# Patient Record
Sex: FEMALE | Race: OTHER | Employment: FULL TIME | URBAN - METROPOLITAN AREA
[De-identification: names, ages, dates, MRNs, and addresses within clinical notes are randomized per-mention and may not be internally consistent; named-entity substitution may affect disease eponyms.]

---

## 2019-03-16 ENCOUNTER — HOSPITAL ENCOUNTER (EMERGENCY)
Facility: HOSPITAL | Age: 54
Discharge: HOME | End: 2019-03-16
Attending: EMERGENCY MEDICINE
Payer: MEDICAID

## 2019-03-16 ENCOUNTER — APPOINTMENT (OUTPATIENT)
Dept: GENERAL RADIOLOGY | Facility: HOSPITAL | Age: 54
End: 2019-03-16
Attending: EMERGENCY MEDICINE
Payer: MEDICAID

## 2019-03-16 VITALS
HEART RATE: 89 BPM | SYSTOLIC BLOOD PRESSURE: 120 MMHG | RESPIRATION RATE: 16 BRPM | OXYGEN SATURATION: 94 % | DIASTOLIC BLOOD PRESSURE: 63 MMHG | TEMPERATURE: 98 F

## 2019-03-16 DIAGNOSIS — J45.901 MODERATE ASTHMA WITH EXACERBATION, UNSPECIFIED WHETHER PERSISTENT: Primary | ICD-10-CM

## 2019-03-16 DIAGNOSIS — R73.9 HYPERGLYCEMIA: ICD-10-CM

## 2019-03-16 LAB
ANION GAP SERPL CALC-SCNC: 6 MMOL/L (ref 0–18)
BASOPHILS # BLD AUTO: 0.03 X10(3) UL (ref 0–0.2)
BASOPHILS NFR BLD AUTO: 0.4 %
BUN BLD-MCNC: 10 MG/DL (ref 7–18)
BUN/CREAT SERPL: 15.2 (ref 10–20)
CALCIUM BLD-MCNC: 9.3 MG/DL (ref 8.5–10.1)
CHLORIDE SERPL-SCNC: 109 MMOL/L (ref 98–107)
CO2 SERPL-SCNC: 26 MMOL/L (ref 21–32)
CREAT BLD-MCNC: 0.66 MG/DL (ref 0.55–1.02)
D DIMER PPP FEU-MCNC: 0.35 MCG/ML (ref ?–0.55)
DEPRECATED RDW RBC AUTO: 42.5 FL (ref 35.1–46.3)
EOSINOPHIL # BLD AUTO: 0.37 X10(3) UL (ref 0–0.7)
EOSINOPHIL NFR BLD AUTO: 5.4 %
ERYTHROCYTE [DISTWIDTH] IN BLOOD BY AUTOMATED COUNT: 12.7 % (ref 11–15)
GLUCOSE BLD-MCNC: 189 MG/DL (ref 70–99)
HCT VFR BLD AUTO: 42.8 % (ref 35–48)
HGB BLD-MCNC: 14.1 G/DL (ref 12–16)
IMM GRANULOCYTES # BLD AUTO: 0.02 X10(3) UL (ref 0–1)
IMM GRANULOCYTES NFR BLD: 0.3 %
LYMPHOCYTES # BLD AUTO: 1.62 X10(3) UL (ref 1–4)
LYMPHOCYTES NFR BLD AUTO: 23.7 %
MCH RBC QN AUTO: 30 PG (ref 26–34)
MCHC RBC AUTO-ENTMCNC: 32.9 G/DL (ref 31–37)
MCV RBC AUTO: 91.1 FL (ref 80–100)
MONOCYTES # BLD AUTO: 0.57 X10(3) UL (ref 0.1–1)
MONOCYTES NFR BLD AUTO: 8.3 %
NEUTROPHILS # BLD AUTO: 4.23 X10 (3) UL (ref 1.5–7.7)
NEUTROPHILS # BLD AUTO: 4.23 X10(3) UL (ref 1.5–7.7)
NEUTROPHILS NFR BLD AUTO: 61.9 %
OSMOLALITY SERPL CALC.SUM OF ELEC: 296 MOSM/KG (ref 275–295)
PLATELET # BLD AUTO: 260 10(3)UL (ref 150–450)
POTASSIUM SERPL-SCNC: 3.4 MMOL/L (ref 3.5–5.1)
RBC # BLD AUTO: 4.7 X10(6)UL (ref 3.8–5.3)
SODIUM SERPL-SCNC: 141 MMOL/L (ref 136–145)
TROPONIN I SERPL-MCNC: <0.045 NG/ML (ref ?–0.04)
WBC # BLD AUTO: 6.8 X10(3) UL (ref 4–11)

## 2019-03-16 PROCEDURE — 85025 COMPLETE CBC W/AUTO DIFF WBC: CPT | Performed by: EMERGENCY MEDICINE

## 2019-03-16 PROCEDURE — 94640 AIRWAY INHALATION TREATMENT: CPT

## 2019-03-16 PROCEDURE — 99285 EMERGENCY DEPT VISIT HI MDM: CPT

## 2019-03-16 PROCEDURE — 85379 FIBRIN DEGRADATION QUANT: CPT | Performed by: EMERGENCY MEDICINE

## 2019-03-16 PROCEDURE — 84484 ASSAY OF TROPONIN QUANT: CPT | Performed by: EMERGENCY MEDICINE

## 2019-03-16 PROCEDURE — 93010 ELECTROCARDIOGRAM REPORT: CPT | Performed by: EMERGENCY MEDICINE

## 2019-03-16 PROCEDURE — 93005 ELECTROCARDIOGRAM TRACING: CPT

## 2019-03-16 PROCEDURE — 80048 BASIC METABOLIC PNL TOTAL CA: CPT | Performed by: EMERGENCY MEDICINE

## 2019-03-16 PROCEDURE — 71045 X-RAY EXAM CHEST 1 VIEW: CPT | Performed by: EMERGENCY MEDICINE

## 2019-03-16 PROCEDURE — 96374 THER/PROPH/DIAG INJ IV PUSH: CPT

## 2019-03-16 RX ORDER — ALBUTEROL SULFATE 90 UG/1
2 AEROSOL, METERED RESPIRATORY (INHALATION) EVERY 4 HOURS PRN
Qty: 1 INHALER | Refills: 0 | Status: SHIPPED | OUTPATIENT
Start: 2019-03-16 | End: 2019-04-15

## 2019-03-16 RX ORDER — BLOOD-GLUCOSE METER
1 KIT MISCELLANEOUS 3 TIMES DAILY
Qty: 1 EACH | Refills: 0 | Status: SHIPPED | OUTPATIENT
Start: 2019-03-16 | End: 2020-09-09

## 2019-03-16 RX ORDER — METHYLPREDNISOLONE SODIUM SUCCINATE 125 MG/2ML
60 INJECTION, POWDER, LYOPHILIZED, FOR SOLUTION INTRAMUSCULAR; INTRAVENOUS ONCE
Status: COMPLETED | OUTPATIENT
Start: 2019-03-16 | End: 2019-03-16

## 2019-03-16 RX ORDER — PREDNISONE 20 MG/1
20 TABLET ORAL DAILY
Qty: 3 TABLET | Refills: 0 | Status: SHIPPED | OUTPATIENT
Start: 2019-03-16 | End: 2019-03-19

## 2019-03-16 RX ORDER — ALBUTEROL SULFATE 2.5 MG/3ML
2.5 SOLUTION RESPIRATORY (INHALATION) EVERY 6 HOURS PRN
Qty: 20 VIAL | Refills: 0 | Status: SHIPPED | OUTPATIENT
Start: 2019-03-16 | End: 2020-01-13

## 2019-03-17 NOTE — ED PROVIDER NOTES
Patient Seen in: [facility] AND [facility] Emergency Department    History   Patient presents with:  Dyspnea CARO SOB (respiratory)    Stated Complaint: SOB/cough     HPI    HPI: Korina Orourke is a 53year old female with a history of asthma who presents with whe Current:/63   Pulse 89   Temp 97.6 °F (36.4 °C)   Resp 16   SpO2 94%   PULSE OX Nl on room air          Physical Exam  Constitutional:  Mild resp distress   Eyes: EOMI, PERRL, conjunctiva normal.  HENT: Atraumatic, oropharynx moist. Neck supple wheezing patient feels well enough to go home.             Disposition and Plan     Clinical Impression:  Moderate asthma with exacerbation, unspecified whether persistent  (primary encounter diagnosis)  Hyperglycemia    Disposition:  Discharge  3/16/2019 total) by mouth daily for 3 days. , Print Script, Disp-3 tablet, R-0

## 2019-04-24 ENCOUNTER — HOSPITAL ENCOUNTER (EMERGENCY)
Facility: HOSPITAL | Age: 54
Discharge: HOME | End: 2019-04-24
Attending: PHYSICIAN ASSISTANT
Payer: OTHER MISCELLANEOUS

## 2019-04-24 ENCOUNTER — APPOINTMENT (OUTPATIENT)
Dept: GENERAL RADIOLOGY | Facility: HOSPITAL | Age: 54
End: 2019-04-24
Attending: PHYSICIAN ASSISTANT
Payer: OTHER MISCELLANEOUS

## 2019-04-24 VITALS
TEMPERATURE: 99 F | OXYGEN SATURATION: 97 % | HEART RATE: 76 BPM | RESPIRATION RATE: 18 BRPM | DIASTOLIC BLOOD PRESSURE: 78 MMHG | SYSTOLIC BLOOD PRESSURE: 124 MMHG

## 2019-04-24 DIAGNOSIS — S86.911A MUSCLE STRAIN OF RIGHT LOWER LEG, INITIAL ENCOUNTER: ICD-10-CM

## 2019-04-24 DIAGNOSIS — S86.911A KNEE STRAIN, RIGHT, INITIAL ENCOUNTER: ICD-10-CM

## 2019-04-24 DIAGNOSIS — S81.801A OPEN WOUND OF RIGHT LOWER LEG, INITIAL ENCOUNTER: Primary | ICD-10-CM

## 2019-04-24 PROCEDURE — 99284 EMERGENCY DEPT VISIT MOD MDM: CPT

## 2019-04-24 PROCEDURE — 73590 X-RAY EXAM OF LOWER LEG: CPT | Performed by: PHYSICIAN ASSISTANT

## 2019-04-24 PROCEDURE — 73630 X-RAY EXAM OF FOOT: CPT | Performed by: PHYSICIAN ASSISTANT

## 2019-04-24 PROCEDURE — 73560 X-RAY EXAM OF KNEE 1 OR 2: CPT | Performed by: PHYSICIAN ASSISTANT

## 2019-04-24 RX ORDER — HYDROCODONE BITARTRATE AND ACETAMINOPHEN 5; 325 MG/1; MG/1
1 TABLET ORAL ONCE
Status: COMPLETED | OUTPATIENT
Start: 2019-04-24 | End: 2019-04-24

## 2019-04-24 RX ORDER — CEPHALEXIN 500 MG/1
500 CAPSULE ORAL 3 TIMES DAILY
Qty: 21 CAPSULE | Refills: 0 | Status: SHIPPED | OUTPATIENT
Start: 2019-04-24 | End: 2019-05-01

## 2019-04-24 RX ORDER — HYDROCODONE BITARTRATE AND ACETAMINOPHEN 5; 325 MG/1; MG/1
1 TABLET ORAL EVERY 6 HOURS PRN
Qty: 12 TABLET | Refills: 0 | Status: SHIPPED | OUTPATIENT
Start: 2019-04-24 | End: 2019-05-01

## 2019-04-24 RX ORDER — NAPROXEN 500 MG/1
500 TABLET ORAL 2 TIMES DAILY PRN
Qty: 20 TABLET | Refills: 0 | Status: SHIPPED | OUTPATIENT
Start: 2019-04-24 | End: 2019-05-01

## 2019-04-24 NOTE — ED INITIAL ASSESSMENT (HPI)
Pt reports a metal object falling on her right lower leg while at work this morning causing a cut. Pt was seen at [facility] and had xray and dressing applied. Pt arrives to [facility] c/o continued bleeding and pain.

## 2019-04-24 NOTE — ED PROVIDER NOTES
Patient Seen in: [facility] AND [facility] Emergency Department    History   Patient presents with:  Laceration Abrasion (integumentary)    Stated Complaint: cut on leg    HPI      HPI:     53year old female who presents with chief complaint of right leg injury signs reviewed. All other systems reviewed and negative except as noted above. PSFH elements reviewed from today and agreed except as otherwise stated in HPI.     Physical Exam     ED Triage Vitals   BP 04/24/19 1503 119/65   Pulse 04/24/19 1501 79 Positive ecchymosis and swelling present at right lateral foot. Generalized tenderness to palpation along right lower leg and right foot. Full range of motion of right lower leg with reported pain at right knee and right foot. Distal pulses intact.   Cap documented. Results reviewed and need for follow-up discussed with patient. Patient case discussed with Dr. Vincent Kruger.     Disposition and Plan     Clinical Impression:  Open wound of right lower leg, initial encounter  (primary encounter diagnosis)  Knee str

## 2019-05-15 ENCOUNTER — OFFICE VISIT (OUTPATIENT)
Dept: FAMILY MEDICINE CLINIC | Facility: CLINIC | Age: 54
End: 2019-05-15
Payer: OTHER MISCELLANEOUS

## 2019-05-15 VITALS
DIASTOLIC BLOOD PRESSURE: 80 MMHG | BODY MASS INDEX: 31.32 KG/M2 | SYSTOLIC BLOOD PRESSURE: 123 MMHG | HEIGHT: 65 IN | RESPIRATION RATE: 20 BRPM | WEIGHT: 188 LBS | HEART RATE: 78 BPM

## 2019-05-15 DIAGNOSIS — M79.604 RIGHT LEG PAIN: ICD-10-CM

## 2019-05-15 DIAGNOSIS — M25.571 ACUTE RIGHT ANKLE PAIN: ICD-10-CM

## 2019-05-15 DIAGNOSIS — S81.801A OPEN WOUND OF RIGHT LOWER LEG, INITIAL ENCOUNTER: Primary | ICD-10-CM

## 2019-05-15 PROCEDURE — 99212 OFFICE O/P EST SF 10 MIN: CPT | Performed by: FAMILY MEDICINE

## 2019-05-15 PROCEDURE — 99214 OFFICE O/P EST MOD 30 MIN: CPT | Performed by: FAMILY MEDICINE

## 2019-05-15 RX ORDER — HYDROCODONE BITARTRATE AND ACETAMINOPHEN 5; 325 MG/1; MG/1
1 TABLET ORAL EVERY 6 HOURS PRN
Qty: 20 TABLET | Refills: 0 | Status: SHIPPED | OUTPATIENT
Start: 2019-05-15 | End: 2019-06-29

## 2019-05-15 RX ORDER — IBUPROFEN 800 MG/1
800 TABLET ORAL 3 TIMES DAILY
Qty: 90 TABLET | Refills: 1 | Status: SHIPPED | OUTPATIENT
Start: 2019-05-15 | End: 2019-07-11 | Stop reason: ALTCHOICE

## 2019-05-15 NOTE — PROGRESS NOTES
Blood pressure 123/80, pulse 78, resp. rate 20, height 5' 5\" (1.651 m), weight 188 lb (85.3 kg).     Patient presents today following up for injury that occurred at work on April 24, 2019 a piece of iron that weighed more than 100 pounds fell on her right

## 2019-05-20 ENCOUNTER — HOSPITAL ENCOUNTER (EMERGENCY)
Facility: HOSPITAL | Age: 54
Discharge: HOME | End: 2019-05-20
Attending: EMERGENCY MEDICINE
Payer: OTHER MISCELLANEOUS

## 2019-05-20 VITALS
BODY MASS INDEX: 31.65 KG/M2 | HEART RATE: 73 BPM | HEIGHT: 65 IN | WEIGHT: 190 LBS | DIASTOLIC BLOOD PRESSURE: 75 MMHG | TEMPERATURE: 99 F | RESPIRATION RATE: 18 BRPM | OXYGEN SATURATION: 99 % | SYSTOLIC BLOOD PRESSURE: 129 MMHG

## 2019-05-20 DIAGNOSIS — L08.9 WOUND INFECTION: Primary | ICD-10-CM

## 2019-05-20 DIAGNOSIS — T14.8XXA WOUND INFECTION: Primary | ICD-10-CM

## 2019-05-20 PROCEDURE — 99283 EMERGENCY DEPT VISIT LOW MDM: CPT

## 2019-05-20 RX ORDER — SULFAMETHOXAZOLE AND TRIMETHOPRIM 800; 160 MG/1; MG/1
1 TABLET ORAL 2 TIMES DAILY
Qty: 20 TABLET | Refills: 0 | Status: SHIPPED | OUTPATIENT
Start: 2019-05-20 | End: 2019-05-30

## 2019-05-20 NOTE — ED NOTES
Wound border is well approximated, dark colored scab in tact, area is warm and non-tender. Wound has scant sanguinous/pus drainage on gauze.

## 2019-05-20 NOTE — ED PROVIDER NOTES
Patient Seen in: [facility] AND [facility] Emergency Department    History   Patient presents with:  Cellulitis (integumentary, infectious)    Stated Complaint: right ankle/foot injury    HPI    26-year-old female with past medical history significant for asthma No tracheal deviation present. CV: s1s2+, RRR, no m/r/g, normal distal pulses  Pulmonary/Chest: CTA b/l with no rales, wheezes. No chest wall tenderness  Abdominal: Nontender. Nondistended. Soft. Bowel sounds are normal.   Back:   :    Musculoskeletal

## 2019-05-20 NOTE — ED INITIAL ASSESSMENT (HPI)
Right lower leg ulcer--patient hurt it on a metal piece 4/24 and wound is looking worse.  Hx of diabetes    Started PT today and was told to come to ED for eval    Finished antibiotics

## 2019-05-21 ENCOUNTER — OFFICE VISIT (OUTPATIENT)
Dept: FAMILY MEDICINE CLINIC | Facility: CLINIC | Age: 54
End: 2019-05-21
Payer: OTHER MISCELLANEOUS

## 2019-05-21 VITALS
BODY MASS INDEX: 32.4 KG/M2 | WEIGHT: 194.5 LBS | SYSTOLIC BLOOD PRESSURE: 126 MMHG | HEIGHT: 65 IN | DIASTOLIC BLOOD PRESSURE: 76 MMHG | HEART RATE: 76 BPM

## 2019-05-21 DIAGNOSIS — Z12.31 SCREENING MAMMOGRAM, ENCOUNTER FOR: Primary | ICD-10-CM

## 2019-05-21 PROCEDURE — 99213 OFFICE O/P EST LOW 20 MIN: CPT | Performed by: FAMILY MEDICINE

## 2019-05-21 PROCEDURE — 99212 OFFICE O/P EST SF 10 MIN: CPT | Performed by: FAMILY MEDICINE

## 2019-05-21 RX ORDER — NAPROXEN 500 MG/1
TABLET ORAL
Refills: 0 | COMMUNITY
Start: 2019-05-07 | End: 2019-07-11 | Stop reason: ALTCHOICE

## 2019-05-21 RX ORDER — ATORVASTATIN CALCIUM 40 MG/1
TABLET, FILM COATED ORAL
Refills: 3 | COMMUNITY
Start: 2019-02-13 | End: 2019-07-25

## 2019-05-21 NOTE — PROGRESS NOTES
Blood pressure 126/76, pulse 76, height 5' 5\" (1.651 m), weight 194 lb 8 oz (88.2 kg). Patient presents today following up for right leg wound has been placing Neosporin on it she no longer has pain she sleeps well at night.     Objective patient is com

## 2019-06-13 ENCOUNTER — HOSPITAL ENCOUNTER (OUTPATIENT)
Dept: MAMMOGRAPHY | Facility: HOSPITAL | Age: 54
Discharge: HOME | End: 2019-06-13
Attending: FAMILY MEDICINE
Payer: COMMERCIAL

## 2019-06-13 DIAGNOSIS — Z12.31 SCREENING MAMMOGRAM, ENCOUNTER FOR: ICD-10-CM

## 2019-06-13 PROCEDURE — 77063 BREAST TOMOSYNTHESIS BI: CPT | Performed by: FAMILY MEDICINE

## 2019-06-13 PROCEDURE — 77067 SCR MAMMO BI INCL CAD: CPT | Performed by: FAMILY MEDICINE

## 2019-06-21 ENCOUNTER — TELEPHONE (OUTPATIENT)
Dept: OTHER | Age: 54
End: 2019-06-21

## 2019-06-21 NOTE — TELEPHONE ENCOUNTER
Patient seen in the office on 5/15/19 for right leg wound. Per her daughter, she did not bump it and patient stated it just opened up on it's own however, it did have a scab and was healing. Patient sent her daughter a picture and asked her to call in.  It

## 2019-06-23 ENCOUNTER — HOSPITAL ENCOUNTER (EMERGENCY)
Facility: HOSPITAL | Age: 54
Discharge: HOME | End: 2019-06-23
Attending: EMERGENCY MEDICINE
Payer: OTHER MISCELLANEOUS

## 2019-06-23 VITALS
TEMPERATURE: 99 F | DIASTOLIC BLOOD PRESSURE: 77 MMHG | HEIGHT: 65 IN | HEART RATE: 82 BPM | RESPIRATION RATE: 17 BRPM | WEIGHT: 190 LBS | BODY MASS INDEX: 31.65 KG/M2 | SYSTOLIC BLOOD PRESSURE: 134 MMHG | OXYGEN SATURATION: 96 %

## 2019-06-23 DIAGNOSIS — L03.90 CELLULITIS, UNSPECIFIED CELLULITIS SITE: ICD-10-CM

## 2019-06-23 DIAGNOSIS — L97.912 LEG ULCER, RIGHT, WITH FAT LAYER EXPOSED (HCC): Primary | ICD-10-CM

## 2019-06-23 PROCEDURE — 99282 EMERGENCY DEPT VISIT SF MDM: CPT

## 2019-06-23 NOTE — ED NOTES
Received pt from triage. Pt here with open wound to RLE that she has had for the past 2 months. Pt states she has seen her PMd and is currently on antibiotics but not getting any better. Wound is open with necrosis and pus. Pt denies pain.  Dr. Emilee Mercado at [facility] f

## 2019-06-23 NOTE — ED PROVIDER NOTES
Patient Seen in: [facility] AND [facility] Emergency Department    History   Patient presents with:  Rash Skin Problem (integumentary)    Stated Complaint: RIGHT FOOT WOUND X 10 DAYS    HPI    Patient is here with complaint of wound to the right lower extremity. WOUND X 10 DAYS  Other systems are as noted in HPI. Constitutional and vital signs reviewed. All other systems reviewed and negative except as noted above.       Physical Exam     ED Triage Vitals [06/23/19 1828]   /77   Pulse 82   Resp 17   Tem are going to call tomorrow they are going to get her in for follow-up. We did redress the wound tonight by doing bacitracin and a fresh bandage.     ED Course   Labs Reviewed - No data to display     MDM     96% Normal  Pulse oximetry         Disposition a

## 2019-06-24 NOTE — CM/SW NOTE
Spoke with Lacie Scheuermann [id number], outpt wound care, regarding scheduling an ER f/u appointment with patient. EDCM does not see any order for wound care. Lacie Scheuermann stated that she will follow up with patient.   However, Lacie Scheuermann also stated that since it is a work

## 2019-06-24 NOTE — CM/SW NOTE
Met with patient and this writer spoke in [address] as pt does not speak English and informed of instruction to follow up with our outpatient wound clinic. Patient and family at bedside v/u, provided with name and contact information of wound clinic.   Dr. Alex Joiner

## 2019-06-25 ENCOUNTER — TELEPHONE (OUTPATIENT)
Dept: FAMILY MEDICINE CLINIC | Facility: CLINIC | Age: 54
End: 2019-06-25

## 2019-06-25 DIAGNOSIS — L97.919 ULCER OF RIGHT LOWER EXTREMITY, UNSPECIFIED ULCER STAGE (HCC): Primary | ICD-10-CM

## 2019-06-25 NOTE — TELEPHONE ENCOUNTER
----- Message from Adrian Dixon DO sent at 6/24/2019  7:48 PM CDT -----  Regarding: FW: Order Needed      ----- Message -----  From: Lauren Joseph  Sent: 6/24/2019   9:34 AM  To: Adrian Dixon DO  Subject: Order Needed

## 2019-06-29 ENCOUNTER — OFFICE VISIT (OUTPATIENT)
Dept: FAMILY MEDICINE CLINIC | Facility: CLINIC | Age: 54
End: 2019-06-29
Payer: COMMERCIAL

## 2019-06-29 VITALS
BODY MASS INDEX: 32.82 KG/M2 | HEIGHT: 65 IN | DIASTOLIC BLOOD PRESSURE: 74 MMHG | WEIGHT: 197 LBS | SYSTOLIC BLOOD PRESSURE: 121 MMHG | TEMPERATURE: 99 F | HEART RATE: 77 BPM

## 2019-06-29 DIAGNOSIS — S81.801D LEG WOUND, RIGHT, SUBSEQUENT ENCOUNTER: Primary | ICD-10-CM

## 2019-06-29 PROCEDURE — 99213 OFFICE O/P EST LOW 20 MIN: CPT | Performed by: FAMILY MEDICINE

## 2019-06-29 PROCEDURE — 99212 OFFICE O/P EST SF 10 MIN: CPT | Performed by: FAMILY MEDICINE

## 2019-06-29 RX ORDER — SULFAMETHOXAZOLE AND TRIMETHOPRIM 800; 160 MG/1; MG/1
1 TABLET ORAL 2 TIMES DAILY
COMMUNITY
End: 2019-07-11 | Stop reason: ALTCHOICE

## 2019-06-29 RX ORDER — CIPROFLOXACIN 500 MG/1
TABLET, FILM COATED ORAL 2 TIMES DAILY
COMMUNITY
End: 2019-07-11 | Stop reason: ALTCHOICE

## 2019-06-29 RX ORDER — CALCIUM CITRATE/VITAMIN D3 200MG-6.25
TABLET ORAL
Refills: 1 | COMMUNITY
Start: 2019-06-08 | End: 2020-07-30

## 2019-06-29 RX ORDER — GLUCOSAM/CHON-MSM1/C/MANG/BOSW 500-416.6
TABLET ORAL
Refills: 1 | COMMUNITY
Start: 2019-06-08 | End: 2020-07-30

## 2019-06-29 NOTE — PROGRESS NOTES
HPI:    Patient ID: Silas Dawson is a 53year old female. Pt presents for follow up from the ER for persistent non-healing wound of right leg. Pt states this occurred after an injury at work.  Has been going to work clinic and is on abx but wound not he Oral Tab,  TK 1 T PO BID WC   naproxen 500 MG Oral Tab,  TK 1 T PO  BID PRN   Acetaminophen (TYLENOL OR),  Take by mouth. HYDROcodone-acetaminophen (NORCO) 5-325 MG Oral Tab,  Take 1 tablet by mouth every 6 (six) hours as needed for Pain.    ibuprofen 800 noted.  Neurology: [place] all extremities equally with good coordination.  No cranial nerve asymmetry noted.   Psychiatric:  Normal affect.  Oriented.  No unusual behavior.  Interacting well.     Patient is here with nonhealing wound with history of diabet Sulfamethoxazole-TMP -160 MG Oral Tab per tablet Take 1 tablet by mouth 2 (two) times daily.  Disp:  Rfl:    atorvastatin 40 MG Oral Tab TK 1 T PO D Disp:  Rfl: 3   metFORMIN HCl 1000 MG Oral Tab TK 1 T PO BID WC Disp:  Rfl: 3   ibuprofen 800 MG Ora

## 2019-07-09 ENCOUNTER — HOSPITAL ENCOUNTER (OUTPATIENT)
Dept: ULTRASOUND IMAGING | Facility: HOSPITAL | Age: 54
Discharge: HOME | End: 2019-07-09
Attending: FAMILY MEDICINE
Payer: COMMERCIAL

## 2019-07-09 ENCOUNTER — HOSPITAL ENCOUNTER (OUTPATIENT)
Dept: MAMMOGRAPHY | Facility: HOSPITAL | Age: 54
Discharge: HOME | End: 2019-07-09
Attending: FAMILY MEDICINE
Payer: COMMERCIAL

## 2019-07-09 DIAGNOSIS — R92.8 ABNORMAL MAMMOGRAM: ICD-10-CM

## 2019-07-09 PROCEDURE — 77062 BREAST TOMOSYNTHESIS BI: CPT | Performed by: FAMILY MEDICINE

## 2019-07-09 PROCEDURE — 77066 DX MAMMO INCL CAD BI: CPT | Performed by: FAMILY MEDICINE

## 2019-07-09 PROCEDURE — 76642 ULTRASOUND BREAST LIMITED: CPT | Performed by: FAMILY MEDICINE

## 2019-07-10 ENCOUNTER — TELEPHONE (OUTPATIENT)
Dept: FAMILY MEDICINE CLINIC | Facility: CLINIC | Age: 54
End: 2019-07-10

## 2019-07-10 NOTE — TELEPHONE ENCOUNTER
Emmanuelle Boswell called in from [employer] in regards to pt's worker's comp case. Emmanuelle Boswell is requesting an update in regards to pt's return to work status. Emmanuelle Boswell is requesting to have an update faxed to fax: [phone] Attn: Emmanuelle Boswell.    Please advise

## 2019-07-11 ENCOUNTER — OFFICE VISIT (OUTPATIENT)
Dept: FAMILY MEDICINE CLINIC | Facility: CLINIC | Age: 54
End: 2019-07-11
Payer: COMMERCIAL

## 2019-07-11 VITALS
TEMPERATURE: 98 F | SYSTOLIC BLOOD PRESSURE: 130 MMHG | HEART RATE: 75 BPM | DIASTOLIC BLOOD PRESSURE: 74 MMHG | BODY MASS INDEX: 33 KG/M2 | WEIGHT: 198 LBS

## 2019-07-11 DIAGNOSIS — S81.801D OPEN WOUND OF RIGHT LOWER EXTREMITY, SUBSEQUENT ENCOUNTER: ICD-10-CM

## 2019-07-11 DIAGNOSIS — E11.9 TYPE 2 DIABETES MELLITUS WITHOUT COMPLICATION, WITHOUT LONG-TERM CURRENT USE OF INSULIN (HCC): Primary | ICD-10-CM

## 2019-07-11 PROCEDURE — 99214 OFFICE O/P EST MOD 30 MIN: CPT | Performed by: FAMILY MEDICINE

## 2019-07-11 RX ORDER — LISINOPRIL 5 MG/1
5 TABLET ORAL DAILY
Qty: 90 TABLET | Refills: 3 | Status: SHIPPED | OUTPATIENT
Start: 2019-07-11 | End: 2020-07-30

## 2019-07-11 NOTE — TELEPHONE ENCOUNTER
Talked to Griselda Nailer daughter of pt explained to her that pt appt today with Dr Aruna Danielson is different with Dr Randolph for tomorrow. And she understood.

## 2019-07-11 NOTE — PROGRESS NOTES
Needs new diabetic doctor. No sob   No foot pain  No problems with sugars. Has wound from 3 months. Still hard and open and from a piece of metal  Had appts cancelled from them  Now has appt with plastics tomorrow.         Patient's past medical surgic DIABETIC TEST STRIPS AND SUPPLIES    2. Open wound of right lower extremity, subsequent encounter  Agree with plastics +- wound center  Call if still no advancement of cure in a short period of time.

## 2019-07-12 ENCOUNTER — APPOINTMENT (OUTPATIENT)
Dept: WOUND CARE | Facility: HOSPITAL | Age: 54
End: 2019-07-12
Attending: NURSE PRACTITIONER
Payer: COMMERCIAL

## 2019-07-12 ENCOUNTER — OFFICE VISIT (OUTPATIENT)
Dept: WOUND CARE | Facility: HOSPITAL | Age: 54
End: 2019-07-12
Attending: NURSE PRACTITIONER
Payer: OTHER MISCELLANEOUS

## 2019-07-12 ENCOUNTER — OFFICE VISIT (OUTPATIENT)
Dept: FAMILY MEDICINE CLINIC | Facility: CLINIC | Age: 54
End: 2019-07-12
Payer: OTHER MISCELLANEOUS

## 2019-07-12 VITALS
DIASTOLIC BLOOD PRESSURE: 70 MMHG | HEIGHT: 65 IN | BODY MASS INDEX: 32.82 KG/M2 | RESPIRATION RATE: 20 BRPM | WEIGHT: 197 LBS | SYSTOLIC BLOOD PRESSURE: 106 MMHG | HEART RATE: 78 BPM

## 2019-07-12 DIAGNOSIS — L97.919 DIABETIC ULCER OF RIGHT LOWER LEG (HCC): Primary | ICD-10-CM

## 2019-07-12 DIAGNOSIS — S81.801D OPEN WOUND OF RIGHT LOWER LEG, SUBSEQUENT ENCOUNTER: Primary | ICD-10-CM

## 2019-07-12 DIAGNOSIS — E11.622 DIABETIC ULCER OF RIGHT LOWER LEG (HCC): Primary | ICD-10-CM

## 2019-07-12 DIAGNOSIS — S91.001A UNSPECIFIED OPEN WOUND, RIGHT ANKLE, INITIAL ENCOUNTER: ICD-10-CM

## 2019-07-12 PROCEDURE — 97597 DBRDMT OPN WND 1ST 20 CM/<: CPT

## 2019-07-12 PROCEDURE — 97162 PT EVAL MOD COMPLEX 30 MIN: CPT

## 2019-07-12 PROCEDURE — 99213 OFFICE O/P EST LOW 20 MIN: CPT | Performed by: FAMILY MEDICINE

## 2019-07-12 PROCEDURE — 99212 OFFICE O/P EST SF 10 MIN: CPT | Performed by: FAMILY MEDICINE

## 2019-07-12 RX ORDER — HYDROCODONE BITARTRATE AND ACETAMINOPHEN 5; 325 MG/1; MG/1
TABLET ORAL
Qty: 40 TABLET | Refills: 0 | Status: SHIPPED | OUTPATIENT
Start: 2019-07-12 | End: 2019-10-14 | Stop reason: ALTCHOICE

## 2019-07-12 NOTE — PROGRESS NOTES
Blood pressure 106/70, pulse 78, resp. rate 20, height 5' 5\" (1.651 m), weight 197 lb (89.4 kg). Patient presents today following up for right lower extremity wound that occurred April 24.   She reports that she is not happy with the care she has receiv

## 2019-07-12 NOTE — PROGRESS NOTES
Subjective    Chief Complaint  This information was obtained from the patient  Patient has an injury on the job 4-24-19 to her right leg. Patient states that the wound has not improved and was not being followed for this wound.  The patient requested to com Wound #1 Right, Lateral Lower Leg is a chronic Full Thickness Trauma Wound and has received a status of Not Healed.  Initial wound encounter measurements are 5.4cm length x 1.8cm width x 0.3cm depth, with an area of 9.72 sq cm and a volume of 2.916 cubic cm Pt presents with a work related injury trauma wound that resulted when a metal object feel and hit her R leg causing a full thickness wound. The wound presented with eschar and slough non viable tissue covering the entire wound bed.  The wound was cleansed Wound #1 (Trauma Wound) is located on the right, lateral lower leg. A selective debridement with a total area debrided of 9.72 sq cm was performed by Damion Carranza PT.  Adipose, Dermis, and Epidermis were removed along with devitalized tissue: biofilm, ca Biofilm, Callus, Exudate, Fibrin, Necrotic/Eschar, Slough  Instrument: Blade, Curette  Bleeding: Minimal  Hemostasis Achieved: Pressure  Procedural Pain: 1  Post Procedural Pain: 0  Response to Treatment: Procedure was tolerated well      Entered By: Amparo Steen

## 2019-07-15 ENCOUNTER — APPOINTMENT (OUTPATIENT)
Dept: LAB | Age: 54
End: 2019-07-15
Attending: FAMILY MEDICINE
Payer: COMMERCIAL

## 2019-07-15 ENCOUNTER — TELEPHONE (OUTPATIENT)
Dept: FAMILY MEDICINE CLINIC | Facility: CLINIC | Age: 54
End: 2019-07-15

## 2019-07-15 DIAGNOSIS — E11.9 TYPE 2 DIABETES MELLITUS WITHOUT COMPLICATION, WITHOUT LONG-TERM CURRENT USE OF INSULIN (HCC): ICD-10-CM

## 2019-07-15 LAB
ALBUMIN SERPL-MCNC: 3.5 G/DL (ref 3.4–5)
ALBUMIN/GLOB SERPL: 0.9 {RATIO} (ref 1–2)
ALP LIVER SERPL-CCNC: 81 U/L (ref 41–108)
ALT SERPL-CCNC: 28 U/L (ref 13–56)
ANION GAP SERPL CALC-SCNC: 6 MMOL/L (ref 0–18)
AST SERPL-CCNC: 13 U/L (ref 15–37)
BILIRUB SERPL-MCNC: 0.5 MG/DL (ref 0.1–2)
BUN BLD-MCNC: 15 MG/DL (ref 7–18)
BUN/CREAT SERPL: 19.5 (ref 10–20)
CALCIUM BLD-MCNC: 9.7 MG/DL (ref 8.5–10.1)
CHLORIDE SERPL-SCNC: 109 MMOL/L (ref 98–112)
CHOLEST SMN-MCNC: 217 MG/DL (ref ?–200)
CO2 SERPL-SCNC: 27 MMOL/L (ref 21–32)
CREAT BLD-MCNC: 0.77 MG/DL (ref 0.55–1.02)
CREAT UR-SCNC: 161 MG/DL
EST. AVERAGE GLUCOSE BLD GHB EST-MCNC: 134 MG/DL (ref 68–126)
GLOBULIN PLAS-MCNC: 4 G/DL (ref 2.8–4.4)
GLUCOSE BLD-MCNC: 122 MG/DL (ref 70–99)
HBA1C MFR BLD HPLC: 6.3 % (ref ?–5.7)
HDLC SERPL-MCNC: 57 MG/DL (ref 40–59)
LDLC SERPL CALC-MCNC: 143 MG/DL (ref ?–100)
M PROTEIN MFR SERPL ELPH: 7.5 G/DL (ref 6.4–8.2)
MICROALBUMIN UR-MCNC: 1.45 MG/DL
MICROALBUMIN/CREAT 24H UR-RTO: 9 UG/MG (ref ?–30)
NONHDLC SERPL-MCNC: 160 MG/DL (ref ?–130)
OSMOLALITY SERPL CALC.SUM OF ELEC: 296 MOSM/KG (ref 275–295)
PATIENT FASTING: YES
PATIENT FASTING: YES
POTASSIUM SERPL-SCNC: 4.5 MMOL/L (ref 3.5–5.1)
SODIUM SERPL-SCNC: 142 MMOL/L (ref 136–145)
TRIGL SERPL-MCNC: 86 MG/DL (ref 30–149)
VLDLC SERPL CALC-MCNC: 17 MG/DL (ref 0–30)

## 2019-07-15 PROCEDURE — 82570 ASSAY OF URINE CREATININE: CPT

## 2019-07-15 PROCEDURE — 80053 COMPREHEN METABOLIC PANEL: CPT

## 2019-07-15 PROCEDURE — 83036 HEMOGLOBIN GLYCOSYLATED A1C: CPT

## 2019-07-15 PROCEDURE — 80061 LIPID PANEL: CPT

## 2019-07-15 PROCEDURE — 36415 COLL VENOUS BLD VENIPUNCTURE: CPT

## 2019-07-15 PROCEDURE — 82043 UR ALBUMIN QUANTITATIVE: CPT

## 2019-07-15 NOTE — TELEPHONE ENCOUNTER
[facility] stating that if pt can return to work and if there any restrictions for her job performance     Fax [phone]

## 2019-07-16 DIAGNOSIS — E11.9 DIABETES MELLITUS WITHOUT COMPLICATION (HCC): Primary | ICD-10-CM

## 2019-07-16 RX ORDER — ROSUVASTATIN CALCIUM 40 MG/1
40 TABLET, COATED ORAL NIGHTLY
Qty: 90 TABLET | Refills: 3 | Status: SHIPPED | OUTPATIENT
Start: 2019-07-16 | End: 2020-09-09

## 2019-07-16 NOTE — TELEPHONE ENCOUNTER
PLEASE RESTRICT PATIENT TO NO MORE THAN 40 HOURS PER WEEK NEEDS TO KEEP WOUND ELEVATED. NEEDS TO FU TO SEE ME IN ONE WEEK.

## 2019-07-16 NOTE — TELEPHONE ENCOUNTER
Spoke to [facility] from [employer]. 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 Pt's on her feet all day. Pls advise on letter. Spoke to pt. Pt states that she seen Vjlio Failing  [facility] on 7/12/19 and had procedure done and was advise to follow-up 1 x /week for 10-12 weeks. . It was discuss by pt and wound center and came to conclusion to not follow-up with .

## 2019-07-16 NOTE — TELEPHONE ENCOUNTER
Please specify type of work patient will be doing. Also patient is to fu with Dr. Barbara Thomas as referred .

## 2019-07-17 NOTE — TELEPHONE ENCOUNTER
Letter has been faxed to Atten: Ember Wood from [facility] at [phone]. Confirmation has been received.

## 2019-07-19 ENCOUNTER — OFFICE VISIT (OUTPATIENT)
Dept: WOUND CARE | Facility: HOSPITAL | Age: 54
End: 2019-07-19
Attending: NURSE PRACTITIONER
Payer: OTHER MISCELLANEOUS

## 2019-07-19 DIAGNOSIS — E11.622 DIABETIC ULCER OF RIGHT LOWER LEG (HCC): Primary | ICD-10-CM

## 2019-07-19 DIAGNOSIS — L97.919 DIABETIC ULCER OF RIGHT LOWER LEG (HCC): Primary | ICD-10-CM

## 2019-07-19 DIAGNOSIS — S91.001A UNSPECIFIED OPEN WOUND, RIGHT ANKLE, INITIAL ENCOUNTER: ICD-10-CM

## 2019-07-19 PROCEDURE — 99213 OFFICE O/P EST LOW 20 MIN: CPT

## 2019-07-19 NOTE — PROGRESS NOTES
Subjective    Chief Complaint  This information was obtained from the patient  7/19/2019 \"I feel better I can see it looks better with no more dead tissue\".      Allergies  shellfish derived (Severity: Severe)    HPI  This information was obtained from th S81.801D - Unspecified open wound, right lower leg, subsequent encounter        Plan  Continue 1x/wk for skilled wound care. Additional Orders: Follow-Up Appointments  Return Appointment in 1 week.  - 30 min x 4 wks            Entered By: Vidya Abbott

## 2019-07-25 ENCOUNTER — OFFICE VISIT (OUTPATIENT)
Dept: ENDOCRINOLOGY CLINIC | Facility: CLINIC | Age: 54
End: 2019-07-25
Payer: COMMERCIAL

## 2019-07-25 VITALS
BODY MASS INDEX: 33 KG/M2 | WEIGHT: 196.81 LBS | SYSTOLIC BLOOD PRESSURE: 116 MMHG | DIASTOLIC BLOOD PRESSURE: 79 MMHG | HEART RATE: 71 BPM

## 2019-07-25 DIAGNOSIS — E11.622 DIABETIC ULCER OF RIGHT LOWER LEG (HCC): Primary | ICD-10-CM

## 2019-07-25 DIAGNOSIS — L97.919 DIABETIC ULCER OF RIGHT LOWER LEG (HCC): Primary | ICD-10-CM

## 2019-07-25 DIAGNOSIS — E11.9 CONTROLLED TYPE 2 DIABETES MELLITUS WITHOUT COMPLICATION, WITHOUT LONG-TERM CURRENT USE OF INSULIN (HCC): ICD-10-CM

## 2019-07-25 LAB
GLUCOSE BLOOD: 89
TEST STRIP LOT #: NORMAL NUMERIC

## 2019-07-25 PROCEDURE — 82962 GLUCOSE BLOOD TEST: CPT | Performed by: NURSE PRACTITIONER

## 2019-07-25 PROCEDURE — 36416 COLLJ CAPILLARY BLOOD SPEC: CPT | Performed by: NURSE PRACTITIONER

## 2019-07-25 PROCEDURE — 99214 OFFICE O/P EST MOD 30 MIN: CPT | Performed by: NURSE PRACTITIONER

## 2019-07-25 NOTE — PROGRESS NOTES
Diabetes Consult     Name: Hanna Vigil  Date: 7/22/2019    Referring Physician: Anderson HAM   Hanna Vigil is a 53year old female who presents for diabetes consult     Has persistent R LE nonhealing wound (> 3 months followe No    Thyroid disease: No    Psychiatric:    Little interest or pleasure in doing things:No   Feeling down, depressed or hopeless : No    Medications:     Current Outpatient Medications:   •  HYDROcodone-acetaminophen (NORCO) 5-325 MG Oral Tab, TAKE 1-2 TA normal conjunctivae, sclera. , normal sclera and normal pupils  Ears/Nose/Mouth/Throat/Neck:  no palpable thyroid nodules or cervical lymphadenopathy  Neck: Trachea midline: Normal  Back: no kyphosis or back tenderness  Respiratory:  clear to auscultation b amount of sugar that the intestines absorbs, and increases insulin sensitivity. It can affect the A1C 1-1.5% with a greater effect on fasting blood sugars but also effects the blood sugar after you eat, there is a risk of low blood sugar and weight loss.  Misty Manual sucrose-containing, high fructose-containing, and  high-glycemic-index foods    Total face to face time was 45 min , more than 50% of the time was spent in counseling and/or coordination of care     Follow up in clinic in 6 months     Referrals  Podiatry-

## 2019-07-26 ENCOUNTER — OFFICE VISIT (OUTPATIENT)
Dept: WOUND CARE | Facility: HOSPITAL | Age: 54
End: 2019-07-26
Attending: NURSE PRACTITIONER
Payer: OTHER MISCELLANEOUS

## 2019-07-26 DIAGNOSIS — S91.001A UNSPECIFIED OPEN WOUND, RIGHT ANKLE, INITIAL ENCOUNTER: ICD-10-CM

## 2019-07-26 DIAGNOSIS — L97.919 DIABETIC ULCER OF RIGHT LOWER LEG (HCC): Primary | ICD-10-CM

## 2019-07-26 DIAGNOSIS — E11.622 DIABETIC ULCER OF RIGHT LOWER LEG (HCC): Primary | ICD-10-CM

## 2019-07-26 PROCEDURE — 99213 OFFICE O/P EST LOW 20 MIN: CPT

## 2019-07-26 NOTE — PROGRESS NOTES
Subjective     Chief Complaint  This information was obtained from the patient  7/26/2019 \"I feel like the wound looks a little better each time, I am happy with my care it seems easy here to get in and out\".       Allergies  shellfish derived (Severity: layer of tubigrip was applied for wound edema.       Active Problems     ICD-10  S81.801D - Unspecified open wound, right lower leg, subsequent encounter           Plan  Continue 1x/wk for skilled wound care.      Additional Orders:     Follow-Up Appointmen

## 2019-07-30 ENCOUNTER — TELEPHONE (OUTPATIENT)
Dept: FAMILY MEDICINE CLINIC | Facility: CLINIC | Age: 54
End: 2019-07-30

## 2019-07-30 NOTE — TELEPHONE ENCOUNTER
[employer]  is requesting and update on follow up for work status. Insurance company also is requesting update as well.      Sudha Morales that insurance will not pay for wages or clamins if no update from Olivia Dee [phone]    Fax #

## 2019-08-02 ENCOUNTER — OFFICE VISIT (OUTPATIENT)
Dept: WOUND CARE | Facility: HOSPITAL | Age: 54
End: 2019-08-02
Attending: NURSE PRACTITIONER
Payer: OTHER MISCELLANEOUS

## 2019-08-02 DIAGNOSIS — E11.622 DIABETIC ULCER OF RIGHT LOWER LEG (HCC): Primary | ICD-10-CM

## 2019-08-02 DIAGNOSIS — L97.919 DIABETIC ULCER OF RIGHT LOWER LEG (HCC): Primary | ICD-10-CM

## 2019-08-02 DIAGNOSIS — S91.001A UNSPECIFIED OPEN WOUND, RIGHT ANKLE, INITIAL ENCOUNTER: ICD-10-CM

## 2019-08-02 PROCEDURE — 97597 DBRDMT OPN WND 1ST 20 CM/<: CPT

## 2019-08-02 PROCEDURE — 0HDKXZZ EXTRACTION OF RIGHT LOWER LEG SKIN, EXTERNAL APPROACH: ICD-10-PCS | Performed by: NURSE PRACTITIONER

## 2019-08-02 NOTE — PROGRESS NOTES
Subjective    Chief Complaint  This information was obtained from the patient  8/2/2019 \"I feel good and I am using a shower bag to keep everything dry and it appears to be working well\".      Allergies  shellfish derived (Severity: Severe)    HPI  This i Pt presents with progressive wound proliferation with increased granulation present in the wound bed.  The superior aspect of the wound presented with a distal 1/3 region of slough and devitalized tissue that was selectively debrided to promote wound approx

## 2019-08-08 ENCOUNTER — OFFICE VISIT (OUTPATIENT)
Dept: FAMILY MEDICINE CLINIC | Facility: CLINIC | Age: 54
End: 2019-08-08
Payer: OTHER MISCELLANEOUS

## 2019-08-08 VITALS
BODY MASS INDEX: 32.39 KG/M2 | HEART RATE: 71 BPM | HEIGHT: 65 IN | SYSTOLIC BLOOD PRESSURE: 119 MMHG | DIASTOLIC BLOOD PRESSURE: 81 MMHG | WEIGHT: 194.38 LBS

## 2019-08-08 DIAGNOSIS — S81.801D OPEN WOUND OF RIGHT LOWER LEG, SUBSEQUENT ENCOUNTER: Primary | ICD-10-CM

## 2019-08-08 PROCEDURE — 99213 OFFICE O/P EST LOW 20 MIN: CPT | Performed by: FAMILY MEDICINE

## 2019-08-08 PROCEDURE — 99212 OFFICE O/P EST SF 10 MIN: CPT | Performed by: FAMILY MEDICINE

## 2019-08-08 NOTE — PROGRESS NOTES
Blood pressure 119/81, pulse 71, height 5' 5\" (1.651 m), weight 194 lb 6.4 oz (88.2 kg). Patient presents today following up for right leg wound. Requires a note for work. Has been seeing physical therapy for wound care.   Notices significant improvem

## 2019-08-09 ENCOUNTER — OFFICE VISIT (OUTPATIENT)
Dept: WOUND CARE | Facility: HOSPITAL | Age: 54
End: 2019-08-09
Attending: NURSE PRACTITIONER
Payer: OTHER MISCELLANEOUS

## 2019-08-09 DIAGNOSIS — E11.622 DIABETIC ULCER OF RIGHT LOWER LEG (HCC): Primary | ICD-10-CM

## 2019-08-09 DIAGNOSIS — L97.919 DIABETIC ULCER OF RIGHT LOWER LEG (HCC): Primary | ICD-10-CM

## 2019-08-09 DIAGNOSIS — S91.001A UNSPECIFIED OPEN WOUND, RIGHT ANKLE, INITIAL ENCOUNTER: ICD-10-CM

## 2019-08-09 PROCEDURE — 99213 OFFICE O/P EST LOW 20 MIN: CPT

## 2019-08-09 NOTE — PROGRESS NOTES
Subjective    Chief Complaint  This information was obtained from the patient  8/9/2019 \"I feel good and the wound is closing down but it's a little itchy\".      Allergies  shellfish derived (Severity: Severe)    HPI  This information was obtained from th Pt presents with a resolving reducing wound. The wound was debrided of non viable dry tissue and the epibolized edge was removed to promote maximal wound approximation and closure.  The wound dressing was changed to a xeroform to promote moist wound healing

## 2019-08-15 ENCOUNTER — OFFICE VISIT (OUTPATIENT)
Dept: WOUND CARE | Facility: HOSPITAL | Age: 54
End: 2019-08-15
Attending: NURSE PRACTITIONER
Payer: OTHER MISCELLANEOUS

## 2019-08-15 DIAGNOSIS — S91.001A UNSPECIFIED OPEN WOUND, RIGHT ANKLE, INITIAL ENCOUNTER: Primary | ICD-10-CM

## 2019-08-15 PROCEDURE — 29581 APPL MULTLAYER CMPRN SYS LEG: CPT

## 2019-08-15 NOTE — PROGRESS NOTES
Subjective    Chief Complaint  This information was obtained from the patient  8/15/19: No new complaints.  Pt states she has itchiness around the wound    Allergies  shellfish derived (Severity: Severe)    HPI  This information was obtained from the patien Calf Measurement 30 cm from heel with right measurement of 39.5 cm  Ankle Measurement 10 cm from heel with right measurement of 27 cm  Foot from at midfoot with right measurement of 26 cm          Assessment  Pt's R lateral leg wound has larger measurement Compression used: using Artiflex 15 cm (1), Comprilan 08 cm (1), Comprilan 10 cm (1)  Time spent (units are in minutes) using Time (15)  Written PT Goals - . Short Term (4-6 weeks)  Reduce necrosis by 100%  Decrease depth by 75%  Written PT Goals - Long Ter

## 2019-08-22 ENCOUNTER — OFFICE VISIT (OUTPATIENT)
Dept: WOUND CARE | Facility: HOSPITAL | Age: 54
End: 2019-08-22
Attending: NURSE PRACTITIONER
Payer: OTHER MISCELLANEOUS

## 2019-08-22 DIAGNOSIS — S91.001A UNSPECIFIED OPEN WOUND, RIGHT ANKLE, INITIAL ENCOUNTER: Primary | ICD-10-CM

## 2019-08-22 PROCEDURE — 99213 OFFICE O/P EST LOW 20 MIN: CPT

## 2019-08-22 NOTE — PROGRESS NOTES
Subjective    Chief Complaint  This information was obtained from the patient  8/22/19: Pt states the compression felt good    Allergies  shellfish derived (Severity: Severe)    HPI  This information was obtained from the patient  HPI: 52 y/o female 7/12/2 Assessment  Pt's R lateral leg wound is resolved today, with fragile neoepithelium. Her edema is reduced and she felt good in the compression, per her report.   Discussed pt purchasing compression stockings (or having workers comp purchase), as she will ne Written PT Goals - . Short Term (4-6 weeks)  Reduce necrosis by 100%  Decrease depth by 75%  Written PT Goals - Long Term (8-12 weeks)  Reduce wound area by 100%  Wound closure  Achieve wound closure to promote return to normal functional gait  Achieve woun

## 2019-08-23 ENCOUNTER — OFFICE VISIT (OUTPATIENT)
Dept: FAMILY MEDICINE CLINIC | Facility: CLINIC | Age: 54
End: 2019-08-23
Payer: OTHER MISCELLANEOUS

## 2019-08-23 VITALS
SYSTOLIC BLOOD PRESSURE: 110 MMHG | HEART RATE: 85 BPM | WEIGHT: 198.19 LBS | HEIGHT: 65 IN | BODY MASS INDEX: 33.02 KG/M2 | DIASTOLIC BLOOD PRESSURE: 66 MMHG

## 2019-08-23 DIAGNOSIS — Z12.11 ENCOUNTER FOR SCREENING COLONOSCOPY: Primary | ICD-10-CM

## 2019-08-23 DIAGNOSIS — E66.9 DIABETES MELLITUS TYPE 2 IN OBESE (HCC): ICD-10-CM

## 2019-08-23 DIAGNOSIS — E11.69 DIABETES MELLITUS TYPE 2 IN OBESE (HCC): ICD-10-CM

## 2019-08-23 PROCEDURE — 99212 OFFICE O/P EST SF 10 MIN: CPT | Performed by: FAMILY MEDICINE

## 2019-08-23 PROCEDURE — 99213 OFFICE O/P EST LOW 20 MIN: CPT | Performed by: FAMILY MEDICINE

## 2019-08-23 NOTE — PROGRESS NOTES
Blood pressure 110/66, pulse 85, height 5' 5\" (1.651 m), weight 198 lb 3.2 oz (89.9 kg). Following up for skin ulcer right lower leg improved significantly history type 2 diabetes. Patient reports the wound has improved significantly.   She has not had

## 2019-08-23 NOTE — PATIENT INSTRUCTIONS
La colonoscopia     Se [place] jet cámara acoplada a un tubo flexible con jet lente que maria luisa imágenes de video. La colonoscopia es un examen que permite mirar el interior del aparato digestivo inferior (el colon y el recto).  Algunas veces de [employer] · [place] esta prueba podrían hacerse biopsias (muestras de tejido), remoción de pólipos y otros tratamientos. · El médico realiza un examen digital del recto para dominic si hay algún problema allí o en el ano.  Se lubrica el recto y se introduce el colonosco

## 2019-08-26 ENCOUNTER — TELEPHONE (OUTPATIENT)
Dept: OTHER | Age: 54
End: 2019-08-26

## 2019-08-26 NOTE — TELEPHONE ENCOUNTER
OK TO PRINT THIS NOTE THAT PATIENT IS DIABETIC WITH SKIN ULCER. IT WILL HEAL BETTER IF EDEMA IS CONTROLLED.  FOR THIS REASON GRADUATED COMPRESSION STOCKINGS ARE MEDICALLY INDICATED

## 2019-08-26 NOTE — TELEPHONE ENCOUNTER
Daughter Kenya Sloan called indicated that Yana needs more specifics on the script for compression stockings, diagnosis, need for medical necessity. Please print out and patient will pickup at office. Please advise.

## 2019-08-27 NOTE — TELEPHONE ENCOUNTER
Letter printed with [facility] msg below. Manually signed by [facility]. Pt's daughter, Martha Ortiz, informed of letter completion and ready for  at [facility]. Office. Verbalized understanding. Letter placed at  for .

## 2019-08-30 ENCOUNTER — OFFICE VISIT (OUTPATIENT)
Dept: FAMILY MEDICINE CLINIC | Facility: CLINIC | Age: 54
End: 2019-08-30
Payer: OTHER MISCELLANEOUS

## 2019-08-30 ENCOUNTER — OFFICE VISIT (OUTPATIENT)
Dept: WOUND CARE | Facility: HOSPITAL | Age: 54
End: 2019-08-30
Attending: NURSE PRACTITIONER
Payer: OTHER MISCELLANEOUS

## 2019-08-30 VITALS
SYSTOLIC BLOOD PRESSURE: 116 MMHG | HEIGHT: 65 IN | WEIGHT: 198.81 LBS | BODY MASS INDEX: 33.12 KG/M2 | DIASTOLIC BLOOD PRESSURE: 77 MMHG | HEART RATE: 88 BPM

## 2019-08-30 DIAGNOSIS — L97.919 DIABETIC ULCER OF RIGHT LOWER LEG (HCC): ICD-10-CM

## 2019-08-30 DIAGNOSIS — S91.001A UNSPECIFIED OPEN WOUND, RIGHT ANKLE, INITIAL ENCOUNTER: Primary | ICD-10-CM

## 2019-08-30 DIAGNOSIS — S81.801D OPEN WOUND OF RIGHT LOWER LEG, SUBSEQUENT ENCOUNTER: Primary | ICD-10-CM

## 2019-08-30 DIAGNOSIS — E11.622 DIABETIC ULCER OF RIGHT LOWER LEG (HCC): ICD-10-CM

## 2019-08-30 PROCEDURE — 99212 OFFICE O/P EST SF 10 MIN: CPT | Performed by: FAMILY MEDICINE

## 2019-08-30 PROCEDURE — 99213 OFFICE O/P EST LOW 20 MIN: CPT

## 2019-08-30 NOTE — PROGRESS NOTES
Blood pressure 116/77, pulse 88, height 5' 5\" (1.651 m), weight 198 lb 12.8 oz (90.2 kg). Patient presents today following up for right leg wound now improved. She would like to go back to work.     Objective right leg with well-healed wound    No tend

## 2019-08-30 NOTE — PROGRESS NOTES
Subjective    Chief Complaint  This information was obtained from the patient  8/22/19: \"I plan to return to work as per my MD's recommendations now that the wound is healed\".      Allergies  shellfish derived (Severity: Severe)    HPI  This information w Achieve wound closure to promote return to normal functional gait - met  Achieve wound closure to promote return to normal functional activities - met    Active Problems    ICD-10  S81.801D - Unspecified open wound, right lower leg, subsequent encounter

## 2019-09-06 ENCOUNTER — APPOINTMENT (OUTPATIENT)
Dept: WOUND CARE | Facility: HOSPITAL | Age: 54
End: 2019-09-06
Attending: NURSE PRACTITIONER
Payer: OTHER MISCELLANEOUS

## 2019-09-07 ENCOUNTER — TELEPHONE (OUTPATIENT)
Dept: FAMILY MEDICINE CLINIC | Facility: CLINIC | Age: 54
End: 2019-09-07

## 2019-09-07 NOTE — TELEPHONE ENCOUNTER
Kaden Cnadelaria from [employer] called and wanted information on the pt progress, her Follow up status, and when will it be possible for her to return to work.     Called pt and her Granddaughter Efrem Hollis translated to the pt who I was and asked her the questions regarding was it ok to release the information to [employer] and pt states yes it is ok to give [employer] the information they need       Please advise     Kaden Candelaria # [phone]  Her cell # [phone]

## 2019-09-09 NOTE — TELEPHONE ENCOUNTER
Cesar Fisher is requesting a call back from PCP only. pls advise. She states she needs to further discuss with .     Ph :[phone]    I advised pt to pls sign FUAD form tomorrow to give permission to employer about her case.

## 2019-09-10 ENCOUNTER — HOSPITAL ENCOUNTER (OUTPATIENT)
Dept: GENERAL RADIOLOGY | Age: 54
Discharge: HOME | End: 2019-09-10
Attending: FAMILY MEDICINE
Payer: OTHER MISCELLANEOUS

## 2019-09-10 ENCOUNTER — OFFICE VISIT (OUTPATIENT)
Dept: FAMILY MEDICINE CLINIC | Facility: CLINIC | Age: 54
End: 2019-09-10
Payer: OTHER MISCELLANEOUS

## 2019-09-10 VITALS
BODY MASS INDEX: 33.12 KG/M2 | DIASTOLIC BLOOD PRESSURE: 82 MMHG | SYSTOLIC BLOOD PRESSURE: 153 MMHG | HEART RATE: 89 BPM | WEIGHT: 198.81 LBS | HEIGHT: 65 IN

## 2019-09-10 DIAGNOSIS — M79.604 RIGHT LEG PAIN: ICD-10-CM

## 2019-09-10 DIAGNOSIS — M79.604 RIGHT LEG PAIN: Primary | ICD-10-CM

## 2019-09-10 PROCEDURE — 73610 X-RAY EXAM OF ANKLE: CPT | Performed by: FAMILY MEDICINE

## 2019-09-10 PROCEDURE — 99212 OFFICE O/P EST SF 10 MIN: CPT | Performed by: FAMILY MEDICINE

## 2019-09-10 PROCEDURE — 73590 X-RAY EXAM OF LOWER LEG: CPT | Performed by: FAMILY MEDICINE

## 2019-09-10 PROCEDURE — 99213 OFFICE O/P EST LOW 20 MIN: CPT | Performed by: FAMILY MEDICINE

## 2019-09-10 NOTE — PROGRESS NOTES
Blood pressure 153/82, pulse 89, height 5' 5\" (1.651 m), weight 198 lb 12.8 oz (90.2 kg). Patient presents today following up for right leg pain. She denies subsequent injury.   She went to work and had to go home because of pain in her right leg and a

## 2019-09-11 ENCOUNTER — TELEPHONE (OUTPATIENT)
Dept: FAMILY MEDICINE CLINIC | Facility: CLINIC | Age: 54
End: 2019-09-11

## 2019-09-11 NOTE — TELEPHONE ENCOUNTER
Leann Foot [facility] AT [place] manager) asked about the status of this case. [facility] would like to speak with Dr. Jasbir Verduzco in regards to their side. Pt came back to work on 9/3 and her ankle was in pain. Worked for a day and a half. [facility] advised her to go to their clinic and was given physical therapy. [facility] would like to inform you of that. Once we get a written consent, please give her a call back. #[phone]. Thank you.

## 2019-09-11 NOTE — TELEPHONE ENCOUNTER
Pt informed of below. Will stop by at [facility] tomorrow to sign FUAD form at 10 AM.  Will leave at  staff for patient to sign.

## 2019-09-11 NOTE — TELEPHONE ENCOUNTER
----- Message from Leonardo Verdugo DO sent at 9/10/2019  7:37 PM CDT -----  xrays no changes patient to fu with ortho as referred.

## 2019-09-12 NOTE — TELEPHONE ENCOUNTER
Brooke Rodriguez from  at  [facility] is looking to speak to Dr. Nichole Maxwell re: patient    Can contact her at [phone]

## 2019-09-12 NOTE — TELEPHONE ENCOUNTER
Called and discussed Chandler Art was to have notes sent from Dr. Prem Perez to my office for my review.

## 2019-09-26 ENCOUNTER — TELEPHONE (OUTPATIENT)
Dept: FAMILY MEDICINE CLINIC | Facility: CLINIC | Age: 54
End: 2019-09-26

## 2019-09-26 NOTE — TELEPHONE ENCOUNTER
[employer] called in requesting an update for Yany huff. She does not state specifically what she is looking for, but states that any update can be faxed to number below. Fax# [phone]    Please advise.

## 2019-10-01 NOTE — TELEPHONE ENCOUNTER
\[facility]\"" is calling to follow up on message below. She states she needs patients work status and has left multiple messages. Fax number below.

## 2019-10-14 ENCOUNTER — OFFICE VISIT (OUTPATIENT)
Dept: FAMILY MEDICINE CLINIC | Facility: CLINIC | Age: 54
End: 2019-10-14
Payer: COMMERCIAL

## 2019-10-14 VITALS
TEMPERATURE: 98 F | WEIGHT: 201 LBS | HEART RATE: 71 BPM | RESPIRATION RATE: 20 BRPM | BODY MASS INDEX: 33 KG/M2 | SYSTOLIC BLOOD PRESSURE: 110 MMHG | DIASTOLIC BLOOD PRESSURE: 72 MMHG

## 2019-10-14 DIAGNOSIS — E66.9 DIABETES MELLITUS TYPE 2 IN OBESE (HCC): ICD-10-CM

## 2019-10-14 DIAGNOSIS — M79.671 RIGHT FOOT PAIN: Primary | ICD-10-CM

## 2019-10-14 DIAGNOSIS — E11.69 DIABETES MELLITUS TYPE 2 IN OBESE (HCC): ICD-10-CM

## 2019-10-14 PROCEDURE — 99214 OFFICE O/P EST MOD 30 MIN: CPT | Performed by: FAMILY MEDICINE

## 2019-10-14 RX ORDER — IBUPROFEN 600 MG/1
600 TABLET ORAL EVERY 8 HOURS PRN
Qty: 45 TABLET | Refills: 1 | Status: SHIPPED | OUTPATIENT
Start: 2019-10-14 | End: 2020-02-14

## 2019-10-14 NOTE — PROGRESS NOTES
Accident documents with mom was okay patient comes in for complaint of right lower leg and foot pain. The pain is becoming chronic she started to have the pain only after she hurt her right foot in an accident at work.   This accident occurred April 24, 20

## 2019-10-15 ENCOUNTER — OFFICE VISIT (OUTPATIENT)
Dept: ORTHOPEDICS CLINIC | Facility: CLINIC | Age: 54
End: 2019-10-15
Payer: OTHER MISCELLANEOUS

## 2019-10-15 VITALS — BODY MASS INDEX: 32.3 KG/M2 | WEIGHT: 201 LBS | HEIGHT: 66 IN

## 2019-10-15 DIAGNOSIS — M25.571 ACUTE RIGHT ANKLE PAIN: Primary | ICD-10-CM

## 2019-10-15 PROCEDURE — 99212 OFFICE O/P EST SF 10 MIN: CPT | Performed by: ORTHOPAEDIC SURGERY

## 2019-10-15 PROCEDURE — 99245 OFF/OP CONSLTJ NEW/EST HI 55: CPT | Performed by: ORTHOPAEDIC SURGERY

## 2019-10-15 NOTE — PROGRESS NOTES
NURSING INTAKE COMMENTS: Patient presents with:   Ankle Pain: Right - here with her daughter who translates for her - onset 4/24/19 at work when some metal bars fell on her R lower leg - states this is a work comp case - she went to ER in [facility] and the s apply Misc, TEST BLOOD GLUCOSE BID, Disp: , Rfl: 1  Glucose Blood (TRUE METRIX BLOOD GLUCOSE TEST) In Vitro Strip, TEST D OR PRN, Disp: , Rfl: 1  metFORMIN HCl 1000 MG Oral Tab, TK 1 T PO BID WC, Disp: , Rfl: 3  albuterol sulfate (2.5 MG/3ML) 0.083% Inhala erythema or ecchymosis. There is some slight effusion but no pitting edema. Musculoskeletal: No tenderness to the medial malleolus or lateral malleolus. Tenderness along the Achilles tendon. The Achilles tendon is overall palpable and intact.   Letitia Collins

## 2019-10-16 NOTE — TELEPHONE ENCOUNTER
Faxed requested info for [facility] to Pearl Arriaza - [phone] and to fax any future medical records requests to [facility] records -[phone].

## 2019-10-28 ENCOUNTER — HOSPITAL ENCOUNTER (EMERGENCY)
Facility: HOSPITAL | Age: 54
Discharge: LEFT BEFORE DISCHARGE | End: 2019-10-28
Payer: COMMERCIAL

## 2019-10-28 VITALS
HEART RATE: 83 BPM | HEIGHT: 65 IN | WEIGHT: 200 LBS | TEMPERATURE: 98 F | DIASTOLIC BLOOD PRESSURE: 54 MMHG | BODY MASS INDEX: 33.32 KG/M2 | OXYGEN SATURATION: 98 % | RESPIRATION RATE: 18 BRPM | SYSTOLIC BLOOD PRESSURE: 141 MMHG

## 2019-11-09 ENCOUNTER — HOSPITAL ENCOUNTER (OUTPATIENT)
Dept: MRI IMAGING | Age: 54
Discharge: HOME | End: 2019-11-09
Attending: PHYSICIAN ASSISTANT
Payer: OTHER MISCELLANEOUS

## 2019-11-09 DIAGNOSIS — M25.571 ACUTE RIGHT ANKLE PAIN: ICD-10-CM

## 2019-11-09 PROCEDURE — 73721 MRI JNT OF LWR EXTRE W/O DYE: CPT | Performed by: PHYSICIAN ASSISTANT

## 2019-11-25 ENCOUNTER — TELEPHONE (OUTPATIENT)
Dept: ORTHOPEDICS CLINIC | Facility: CLINIC | Age: 54
End: 2019-11-25

## 2019-11-25 NOTE — TELEPHONE ENCOUNTER
Lm for pt. *Want to clarify if ok to tell employer when upcoming appt to discuss mri results is. * Asked for pt to call back.

## 2019-12-02 ENCOUNTER — OFFICE VISIT (OUTPATIENT)
Dept: ORTHOPEDICS CLINIC | Facility: CLINIC | Age: 54
End: 2019-12-02
Payer: OTHER MISCELLANEOUS

## 2019-12-02 VITALS — WEIGHT: 200 LBS | HEIGHT: 65 IN | BODY MASS INDEX: 33.32 KG/M2

## 2019-12-02 DIAGNOSIS — M76.61 ACHILLES TENDINITIS OF RIGHT LOWER EXTREMITY: Primary | ICD-10-CM

## 2019-12-02 PROCEDURE — 99214 OFFICE O/P EST MOD 30 MIN: CPT | Performed by: ORTHOPAEDIC SURGERY

## 2019-12-02 PROCEDURE — 99212 OFFICE O/P EST SF 10 MIN: CPT | Performed by: ORTHOPAEDIC SURGERY

## 2019-12-03 NOTE — PROGRESS NOTES
NURSING INTAKE COMMENTS: Patient presents with:   Ankle Pain: Right f/u and MRI results - here with her daughter who translates for her - states she has a little pain rated as 5/10 on and off - has numbness sometimes and the swelling dose not go down (2.5 mg total) by nebulization every 6 (six) hours as needed for Wheezing. 20 vial 0   • NEBULIZER SYSTEM ALL-IN-ONE Does not apply Misc 1 Units by Does not apply route 3 (three) times daily.  1 each 0       Fish-Derived Produc*    HIVES, WHEEZING  Shellfis COMPARISON:  [facility], XR ANKLE (MIN 3 VIEWS), RIGHT (CPT=73610), 9/10/2019, 11:47.   INDICATIONS:  M25.571 Pain in right ankle and joints of right foot  TECHNIQUE:  Multiplanar imaging of the ankle is acquired including a 07/15/2019    GFRNAA 87 07/15/2019    GFRAA 101 07/15/2019        Assessment and Plan:  Diagnoses and all orders for this visit:    Achilles tendinitis of right lower extremity  -     PHYSICAL THERAPY - INTERNAL        Assessment: Partial tearing of the ri

## 2020-01-10 ENCOUNTER — HOSPITAL ENCOUNTER (OUTPATIENT)
Dept: MAMMOGRAPHY | Facility: HOSPITAL | Age: 55
Discharge: HOME | End: 2020-01-10
Attending: FAMILY MEDICINE
Payer: COMMERCIAL

## 2020-01-10 ENCOUNTER — HOSPITAL ENCOUNTER (OUTPATIENT)
Dept: ULTRASOUND IMAGING | Facility: HOSPITAL | Age: 55
Discharge: HOME | End: 2020-01-10
Attending: FAMILY MEDICINE
Payer: COMMERCIAL

## 2020-01-10 DIAGNOSIS — N60.19 CYSTIC BREAST, UNSPECIFIED LATERALITY: Primary | ICD-10-CM

## 2020-01-10 DIAGNOSIS — R92.8 ABNORMAL MAMMOGRAM: ICD-10-CM

## 2020-01-10 DIAGNOSIS — R92.2 INCONCLUSIVE MAMMOGRAM: ICD-10-CM

## 2020-01-10 PROCEDURE — 76642 ULTRASOUND BREAST LIMITED: CPT | Performed by: FAMILY MEDICINE

## 2020-01-10 PROCEDURE — 77062 BREAST TOMOSYNTHESIS BI: CPT | Performed by: FAMILY MEDICINE

## 2020-01-10 PROCEDURE — 77066 DX MAMMO INCL CAD BI: CPT | Performed by: FAMILY MEDICINE

## 2020-01-13 ENCOUNTER — OFFICE VISIT (OUTPATIENT)
Dept: FAMILY MEDICINE CLINIC | Facility: CLINIC | Age: 55
End: 2020-01-13
Payer: COMMERCIAL

## 2020-01-13 VITALS
HEIGHT: 65 IN | TEMPERATURE: 98 F | WEIGHT: 199 LBS | BODY MASS INDEX: 33.15 KG/M2 | HEART RATE: 80 BPM | DIASTOLIC BLOOD PRESSURE: 78 MMHG | SYSTOLIC BLOOD PRESSURE: 123 MMHG

## 2020-01-13 DIAGNOSIS — M25.571 CHRONIC PAIN OF RIGHT ANKLE: Primary | ICD-10-CM

## 2020-01-13 DIAGNOSIS — E11.9 TYPE 2 DIABETES MELLITUS WITHOUT COMPLICATION, WITHOUT LONG-TERM CURRENT USE OF INSULIN (HCC): ICD-10-CM

## 2020-01-13 DIAGNOSIS — L60.8 NAIL DISCOLORATION: ICD-10-CM

## 2020-01-13 DIAGNOSIS — J45.20 MILD INTERMITTENT ASTHMA, UNSPECIFIED WHETHER COMPLICATED: ICD-10-CM

## 2020-01-13 DIAGNOSIS — G89.29 CHRONIC PAIN OF RIGHT ANKLE: Primary | ICD-10-CM

## 2020-01-13 PROCEDURE — 99214 OFFICE O/P EST MOD 30 MIN: CPT | Performed by: FAMILY MEDICINE

## 2020-01-13 RX ORDER — ALBUTEROL SULFATE 2.5 MG/3ML
2.5 SOLUTION RESPIRATORY (INHALATION) EVERY 6 HOURS PRN
Qty: 75 VIAL | Refills: 1 | Status: SHIPPED | OUTPATIENT
Start: 2020-01-13 | End: 2021-04-13

## 2020-01-13 NOTE — PROGRESS NOTES
Patient with chronic pain and swelling right ankle. injured 4/4/2019  Pain is daily  Has been thee same for months. Sugars have been fine.     Has cough a few days ago    Exam  Ht rrr no m  Lungs clear  Ext rt foot ankle swollen slightly discolored  Pos

## 2020-02-14 ENCOUNTER — OFFICE VISIT (OUTPATIENT)
Dept: FAMILY MEDICINE CLINIC | Facility: CLINIC | Age: 55
End: 2020-02-14
Payer: COMMERCIAL

## 2020-02-14 VITALS
HEART RATE: 74 BPM | WEIGHT: 196 LBS | BODY MASS INDEX: 32.65 KG/M2 | DIASTOLIC BLOOD PRESSURE: 76 MMHG | SYSTOLIC BLOOD PRESSURE: 118 MMHG | HEIGHT: 65 IN | TEMPERATURE: 98 F

## 2020-02-14 DIAGNOSIS — J45.42 MODERATE PERSISTENT ASTHMA WITH STATUS ASTHMATICUS: Primary | ICD-10-CM

## 2020-02-14 PROCEDURE — 99213 OFFICE O/P EST LOW 20 MIN: CPT | Performed by: FAMILY MEDICINE

## 2020-02-14 RX ORDER — PREDNISONE 20 MG/1
20 TABLET ORAL 2 TIMES DAILY
Qty: 10 TABLET | Refills: 0 | Status: SHIPPED | OUTPATIENT
Start: 2020-02-14 | End: 2020-02-19

## 2020-02-14 NOTE — PROGRESS NOTES
1 week cough wheezing.   Sugars are usually good but with the albuterol they go up    No fever   No sob   No chest pain   No neck stiffness  No Headaches      Well-hydrated no shortness of breath no apparent distress but congested   Normocephalic atraumatic

## 2020-02-17 ENCOUNTER — OFFICE VISIT (OUTPATIENT)
Dept: DERMATOLOGY CLINIC | Facility: CLINIC | Age: 55
End: 2020-02-17
Payer: COMMERCIAL

## 2020-02-17 DIAGNOSIS — B35.1 ONYCHOMYCOSIS: Primary | ICD-10-CM

## 2020-02-17 PROCEDURE — 99202 OFFICE O/P NEW SF 15 MIN: CPT | Performed by: DERMATOLOGY

## 2020-02-17 RX ORDER — TERBINAFINE HYDROCHLORIDE 250 MG/1
250 TABLET ORAL DAILY
Qty: 30 TABLET | Refills: 1 | Status: SHIPPED | OUTPATIENT
Start: 2020-02-17 | End: 2020-03-18

## 2020-02-21 ENCOUNTER — HOSPITAL ENCOUNTER (EMERGENCY)
Facility: HOSPITAL | Age: 55
Discharge: HOME | End: 2020-02-21
Attending: EMERGENCY MEDICINE
Payer: COMMERCIAL

## 2020-02-21 ENCOUNTER — APPOINTMENT (OUTPATIENT)
Dept: GENERAL RADIOLOGY | Facility: HOSPITAL | Age: 55
End: 2020-02-21
Attending: EMERGENCY MEDICINE
Payer: COMMERCIAL

## 2020-02-21 VITALS
TEMPERATURE: 98 F | OXYGEN SATURATION: 97 % | SYSTOLIC BLOOD PRESSURE: 119 MMHG | DIASTOLIC BLOOD PRESSURE: 72 MMHG | BODY MASS INDEX: 31.5 KG/M2 | HEART RATE: 90 BPM | HEIGHT: 66 IN | WEIGHT: 196 LBS | RESPIRATION RATE: 14 BRPM

## 2020-02-21 DIAGNOSIS — R07.9 CHEST PAIN OF UNCERTAIN ETIOLOGY: ICD-10-CM

## 2020-02-21 DIAGNOSIS — J45.901 ASTHMA EXACERBATION, MILD: Primary | ICD-10-CM

## 2020-02-21 LAB
ANION GAP SERPL CALC-SCNC: 7 MMOL/L (ref 0–18)
BASOPHILS # BLD AUTO: 0.05 X10(3) UL (ref 0–0.2)
BASOPHILS NFR BLD AUTO: 0.6 %
BUN BLD-MCNC: 20 MG/DL (ref 7–18)
BUN/CREAT SERPL: 21.5 (ref 10–20)
CALCIUM BLD-MCNC: 9 MG/DL (ref 8.5–10.1)
CHLORIDE SERPL-SCNC: 105 MMOL/L (ref 98–112)
CO2 SERPL-SCNC: 28 MMOL/L (ref 21–32)
CREAT BLD-MCNC: 0.93 MG/DL (ref 0.55–1.02)
D DIMER PPP FEU-MCNC: 0.36 UG/ML FEU (ref ?–0.56)
DEPRECATED RDW RBC AUTO: 41.1 FL (ref 35.1–46.3)
EOSINOPHIL # BLD AUTO: 0.31 X10(3) UL (ref 0–0.7)
EOSINOPHIL NFR BLD AUTO: 3.5 %
ERYTHROCYTE [DISTWIDTH] IN BLOOD BY AUTOMATED COUNT: 12.6 % (ref 11–15)
GLUCOSE BLD-MCNC: 324 MG/DL (ref 70–99)
HCT VFR BLD AUTO: 38 % (ref 35–48)
HGB BLD-MCNC: 13.3 G/DL (ref 12–16)
IMM GRANULOCYTES # BLD AUTO: 0.02 X10(3) UL (ref 0–1)
IMM GRANULOCYTES NFR BLD: 0.2 %
LYMPHOCYTES # BLD AUTO: 2.17 X10(3) UL (ref 1–4)
LYMPHOCYTES NFR BLD AUTO: 24.2 %
MCH RBC QN AUTO: 31 PG (ref 26–34)
MCHC RBC AUTO-ENTMCNC: 35 G/DL (ref 31–37)
MCV RBC AUTO: 88.6 FL (ref 80–100)
MONOCYTES # BLD AUTO: 0.55 X10(3) UL (ref 0.1–1)
MONOCYTES NFR BLD AUTO: 6.1 %
NEUTROPHILS # BLD AUTO: 5.88 X10 (3) UL (ref 1.5–7.7)
NEUTROPHILS # BLD AUTO: 5.88 X10(3) UL (ref 1.5–7.7)
NEUTROPHILS NFR BLD AUTO: 65.4 %
OSMOLALITY SERPL CALC.SUM OF ELEC: 305 MOSM/KG (ref 275–295)
PLATELET # BLD AUTO: 268 10(3)UL (ref 150–450)
POTASSIUM SERPL-SCNC: 3.6 MMOL/L (ref 3.5–5.1)
RBC # BLD AUTO: 4.29 X10(6)UL (ref 3.8–5.3)
SODIUM SERPL-SCNC: 140 MMOL/L (ref 136–145)
TROPONIN I SERPL-MCNC: <0.045 NG/ML (ref ?–0.04)
WBC # BLD AUTO: 9 X10(3) UL (ref 4–11)

## 2020-02-21 PROCEDURE — 85025 COMPLETE CBC W/AUTO DIFF WBC: CPT | Performed by: EMERGENCY MEDICINE

## 2020-02-21 PROCEDURE — 71045 X-RAY EXAM CHEST 1 VIEW: CPT | Performed by: EMERGENCY MEDICINE

## 2020-02-21 PROCEDURE — 93005 ELECTROCARDIOGRAM TRACING: CPT

## 2020-02-21 PROCEDURE — 84484 ASSAY OF TROPONIN QUANT: CPT | Performed by: EMERGENCY MEDICINE

## 2020-02-21 PROCEDURE — 96374 THER/PROPH/DIAG INJ IV PUSH: CPT

## 2020-02-21 PROCEDURE — 85379 FIBRIN DEGRADATION QUANT: CPT | Performed by: EMERGENCY MEDICINE

## 2020-02-21 PROCEDURE — 99285 EMERGENCY DEPT VISIT HI MDM: CPT

## 2020-02-21 PROCEDURE — 93010 ELECTROCARDIOGRAM REPORT: CPT | Performed by: EMERGENCY MEDICINE

## 2020-02-21 PROCEDURE — 94640 AIRWAY INHALATION TREATMENT: CPT

## 2020-02-21 PROCEDURE — 80048 BASIC METABOLIC PNL TOTAL CA: CPT | Performed by: EMERGENCY MEDICINE

## 2020-02-21 RX ORDER — METHYLPREDNISOLONE SODIUM SUCCINATE 125 MG/2ML
125 INJECTION, POWDER, LYOPHILIZED, FOR SOLUTION INTRAMUSCULAR; INTRAVENOUS ONCE
Status: COMPLETED | OUTPATIENT
Start: 2020-02-21 | End: 2020-02-21

## 2020-02-21 RX ORDER — IPRATROPIUM BROMIDE AND ALBUTEROL SULFATE 2.5; .5 MG/3ML; MG/3ML
3 SOLUTION RESPIRATORY (INHALATION) ONCE
Status: COMPLETED | OUTPATIENT
Start: 2020-02-21 | End: 2020-02-21

## 2020-02-21 RX ORDER — ALBUTEROL SULFATE 90 UG/1
2 AEROSOL, METERED RESPIRATORY (INHALATION) EVERY 4 HOURS PRN
Qty: 1 INHALER | Refills: 0 | Status: SHIPPED | OUTPATIENT
Start: 2020-02-21 | End: 2020-03-22

## 2020-02-22 NOTE — ED NOTES
Reviewed discharge information with patient with daughter translating. Patient offered language line for translation, patient declined. Patient verbalized understanding, no further questions or complaints at this time.  Patient is alert and orientated x4, i

## 2020-02-22 NOTE — ED NOTES
Assumed care of patient from triage. Patient to ED from home for chest tightness, SOB. Patient reports cough x1 week with worsening SOB and chest tightness. Patient reports pain to chest with coughing.  Patient is alert, oriented x4, in no apparent distress

## 2020-02-22 NOTE — ED PROVIDER NOTES
Patient Seen in: [facility] [place] [facility] Emergency Department      History   Patient presents with:  Chest Pain Angina  Dyspnea CARO SOB    Stated Complaint: CP, CARO    HPI    62-year-old female presents the ER with complaint of 1 week of chest tightness and di Musculoskeletal: Normal range of motion and neck supple. Cardiovascular:      Rate and Rhythm: Normal rate and regular rhythm. Pulses: Normal pulses. Heart sounds: Normal heart sounds.    Pulmonary:      Effort: Pulmonary effort is normal. EKG    Rate, intervals and axes as noted on EKG Report. Rate: 84  Rhythm: Sinus Rhythm  Reading: No ST deviation,                      MDM     58-year-old female presents the ER with complaint of shortness of breath.   Patient breathing is improved wit

## 2020-02-25 ENCOUNTER — TELEPHONE (OUTPATIENT)
Dept: FAMILY MEDICINE CLINIC | Facility: CLINIC | Age: 55
End: 2020-02-25

## 2020-02-25 ENCOUNTER — OFFICE VISIT (OUTPATIENT)
Dept: FAMILY MEDICINE CLINIC | Facility: CLINIC | Age: 55
End: 2020-02-25
Payer: COMMERCIAL

## 2020-02-25 VITALS
HEART RATE: 89 BPM | DIASTOLIC BLOOD PRESSURE: 69 MMHG | TEMPERATURE: 98 F | HEIGHT: 66 IN | SYSTOLIC BLOOD PRESSURE: 103 MMHG | BODY MASS INDEX: 31.82 KG/M2 | WEIGHT: 198 LBS

## 2020-02-25 DIAGNOSIS — J45.21 MILD INTERMITTENT ASTHMA WITH EXACERBATION: ICD-10-CM

## 2020-02-25 DIAGNOSIS — E11.9 TYPE 2 DIABETES MELLITUS WITHOUT COMPLICATION, WITHOUT LONG-TERM CURRENT USE OF INSULIN (HCC): ICD-10-CM

## 2020-02-25 DIAGNOSIS — R05.9 COUGH: Primary | ICD-10-CM

## 2020-02-25 DIAGNOSIS — R94.31 ABNORMAL EKG: ICD-10-CM

## 2020-02-25 PROCEDURE — 99214 OFFICE O/P EST MOD 30 MIN: CPT | Performed by: FAMILY MEDICINE

## 2020-02-25 RX ORDER — FLUTICASONE PROPIONATE AND SALMETEROL 250; 50 UG/1; UG/1
1 POWDER RESPIRATORY (INHALATION) EVERY 12 HOURS SCHEDULED
Qty: 1 PACKAGE | Refills: 0 | Status: SHIPPED | OUTPATIENT
Start: 2020-02-25 | End: 2020-09-09

## 2020-02-25 NOTE — PROGRESS NOTES
HPI:    Patient ID: Silas Dawson is a 63year old female.     HPI  Patient presents with:  ER F/U: Jen Hooker to [address] [place] [address] on 2-21-20 for chest pain and asthma pt states she gets a cough during the night     Patient presents with:  Chest Pain Angina  Dyspnea CARO SOB Extraocular Movements: Extraocular movements intact. Conjunctiva/sclera: Conjunctivae normal.      Pupils: Pupils are equal, round, and reactive to light. Neck:      Musculoskeletal: Normal range of motion and neck supple.    Cardiovascular:    Please view results for these tests on the individual orders. RAINBOW DRAW BLUE   RAINBOW DRAW LAVENDER   RAINBOW DRAW LIGHT GREEN   RAINBOW DRAW GOLD   CBC W/ DIFFERENTIAL      EKG     Rate, intervals and axes as noted on EKG Report.   R Base) MCG/ACT Inhalation Aero Soln Inhale 2 puffs into the lungs every 4 (four) hours as needed for Wheezing. 1 Inhaler 0   • Terbinafine HCl 250 MG Oral Tab Take 1 tablet (250 mg total) by mouth daily.  30 tablet 1   • metFORMIN HCl 1000 MG Oral Tab TK 1 T intermittent asthma with exacerbation  Add advair  - fluticasone-salmeterol (ADVAIR DISKUS) 250-50 MCG/DOSE Inhalation Aerosol Powder, Breath Activated; Inhale 1 puff into the lungs every 12 (twelve) hours. Dispense: 1 Package; Refill: 0    3.  Type 2 diab

## 2020-02-25 NOTE — TELEPHONE ENCOUNTER
Scheduled to see Dr Tara Gr at 3:15 pm today 2/25/20. Daughter Wilmar Villeda, stated patient seen by Dr Emmanuelle Arndt 2/14/20 for her asthma, given medicine, but not better, so went to ER 2/21/20 for the asthma.  Patient using her inhaler and nebulizer as o

## 2020-03-01 NOTE — PROGRESS NOTES
Jasmina Armando is a 63year old female. Patient presents with:  Derm Problem: New pt presents with darkening and thickening to toenails on both feet x2 years.  recommended derm visit. Fish-Derived Products;  Shellfish-Derived Produ (250 mg total) by mouth daily. 30 tablet 1   • metFORMIN HCl 1000 MG Oral Tab TK 1 T PO  tablet 1   • albuterol sulfate (2.5 MG/3ML) 0.083% Inhalation Nebu Soln Take 3 mL (2.5 mg total) by nebulization every 6 (six) hours as needed for Wheezing.  75 Never Smoker      Smokeless tobacco: Never Used    Substance and Sexual Activity      Alcohol use: Not Currently        Frequency: Never      Drug use: Never      Sexual activity: Not on file    Lifestyle      Physical activity:        Days per week: Not o involved, appropriate areas of skin performed, including scalp, head, neck, face,nails, hair, external eyes, including conjunctival mucosa, eyelids, lips, external ears, back, chest, abdomen, arms, legs, palms.   Remarkable for lesions as noted toenails all resulting from errors in recognition. No orders of the defined types were placed in this encounter.       Meds & Refills for this Visit:   Requested Prescriptions     Signed Prescriptions Disp Refills   • Terbinafine HCl 250 MG Oral Tab 30 tablet 1     S

## 2020-03-02 ENCOUNTER — OFFICE VISIT (OUTPATIENT)
Dept: ENDOCRINOLOGY CLINIC | Facility: CLINIC | Age: 55
End: 2020-03-02
Payer: MEDICAID

## 2020-03-02 VITALS
SYSTOLIC BLOOD PRESSURE: 133 MMHG | WEIGHT: 197.81 LBS | HEART RATE: 74 BPM | DIASTOLIC BLOOD PRESSURE: 73 MMHG | BODY MASS INDEX: 32 KG/M2

## 2020-03-02 DIAGNOSIS — E11.65 TYPE 2 DIABETES MELLITUS WITH HYPERGLYCEMIA, WITH LONG-TERM CURRENT USE OF INSULIN (HCC): Primary | ICD-10-CM

## 2020-03-02 DIAGNOSIS — Z79.4 TYPE 2 DIABETES MELLITUS WITH HYPERGLYCEMIA, WITH LONG-TERM CURRENT USE OF INSULIN (HCC): Primary | ICD-10-CM

## 2020-03-02 PROBLEM — E11.9 TYPE 2 DIABETES MELLITUS (HCC): Status: ACTIVE | Noted: 2020-03-02

## 2020-03-02 LAB
CARTRIDGE LOT#: ABNORMAL NUMERIC
GLUCOSE BLOOD: 198
HEMOGLOBIN A1C: 8.1 % (ref 4.3–5.6)
TEST STRIP LOT #: NORMAL NUMERIC

## 2020-03-02 PROCEDURE — 82962 GLUCOSE BLOOD TEST: CPT | Performed by: INTERNAL MEDICINE

## 2020-03-02 PROCEDURE — 36416 COLLJ CAPILLARY BLOOD SPEC: CPT | Performed by: INTERNAL MEDICINE

## 2020-03-02 PROCEDURE — 99214 OFFICE O/P EST MOD 30 MIN: CPT | Performed by: INTERNAL MEDICINE

## 2020-03-02 PROCEDURE — 83036 HEMOGLOBIN GLYCOSYLATED A1C: CPT | Performed by: INTERNAL MEDICINE

## 2020-03-02 NOTE — PROGRESS NOTES
HISTORY OF PRESENT ILLNESS   Suzette Mccoy is a 63year old female who presents for diabetes.     Other Significant medical hx includes: DM, Asthma, hyperlipidemia     Diagnosed with diabetes around age 47     Family hx of diabetes denies     Dietary co Supports (MEDICAL COMPRESSION STOCKINGS) Does not apply Misc, 1 Application by Does not apply route daily. , Disp: 2 each, Rfl: 5  •  Rosuvastatin Calcium 40 MG Oral Tab, Take 1 tablet (40 mg total) by mouth nightly., Disp: 90 tablet, Rfl: 3  •  lisinopril General Appearance:  alert, well developed, in no acute distress  Eyes:  normal conjunctivae, sclera. , normal sclera and normal pupils  Ears/Nose/Mouth/Throat/Neck:  no palpable thyroid nodules or cervical lymphadenopathy  Neck: Trachea midline: Norm reactions, nausea, vomiting, diarrhea, pancreatitis, gastroparesis and rare side effect evelio Mitchell syndrome.       Reviewed Risk of Hypoglycemia / Recognition & Treatment - rule of 15       -Reviewed Mechanism of Action/ Risk Benefit / Side effects of e

## 2020-03-04 ENCOUNTER — TELEPHONE (OUTPATIENT)
Dept: ENDOCRINOLOGY CLINIC | Facility: CLINIC | Age: 55
End: 2020-03-04

## 2020-03-04 NOTE — TELEPHONE ENCOUNTER
Pts daughter Tk Rubio states she is returning a call from this office, possibly about paperwork? Please call.

## 2020-03-10 NOTE — TELEPHONE ENCOUNTER
Chart reviewed and don't see any phone calls office made to the patient. There's also no paper work office is working on. Per Heidi Coy they received a phone call 15 minutes after they left the office on 03/04/20 and thought they left some paperwork.   RN a

## 2020-03-17 ENCOUNTER — HOSPITAL ENCOUNTER (OUTPATIENT)
Dept: CV DIAGNOSTICS | Facility: HOSPITAL | Age: 55
Discharge: HOME | End: 2020-03-17
Attending: FAMILY MEDICINE
Payer: MEDICAID

## 2020-03-17 ENCOUNTER — HOSPITAL ENCOUNTER (OUTPATIENT)
Dept: NUCLEAR MEDICINE | Facility: HOSPITAL | Age: 55
Discharge: HOME | End: 2020-03-17
Attending: FAMILY MEDICINE
Payer: MEDICAID

## 2020-03-17 DIAGNOSIS — R05.9 COUGH: ICD-10-CM

## 2020-03-17 DIAGNOSIS — E11.9 TYPE 2 DIABETES MELLITUS WITHOUT COMPLICATION, WITHOUT LONG-TERM CURRENT USE OF INSULIN (HCC): ICD-10-CM

## 2020-03-17 DIAGNOSIS — R94.31 ABNORMAL EKG: ICD-10-CM

## 2020-03-17 PROCEDURE — 93017 CV STRESS TEST TRACING ONLY: CPT | Performed by: FAMILY MEDICINE

## 2020-03-17 PROCEDURE — 93016 CV STRESS TEST SUPVJ ONLY: CPT | Performed by: FAMILY MEDICINE

## 2020-03-17 PROCEDURE — 78452 HT MUSCLE IMAGE SPECT MULT: CPT | Performed by: FAMILY MEDICINE

## 2020-03-17 PROCEDURE — 93018 CV STRESS TEST I&R ONLY: CPT | Performed by: FAMILY MEDICINE

## 2020-04-10 ENCOUNTER — TELEPHONE (OUTPATIENT)
Dept: FAMILY MEDICINE CLINIC | Facility: CLINIC | Age: 55
End: 2020-04-10

## 2020-04-10 NOTE — TELEPHONE ENCOUNTER
Spoke with daughter Pauline--requesting order for compression stocking from Dr. Yovani Dacosta    When asked, daughter did not know what size patient wears--\"she just asked me to call for the compression stocking she usually wears.  Can the order be sent sirectly

## 2020-04-24 ENCOUNTER — NURSE TRIAGE (OUTPATIENT)
Dept: FAMILY MEDICINE CLINIC | Facility: CLINIC | Age: 55
End: 2020-04-24

## 2020-04-24 NOTE — TELEPHONE ENCOUNTER
Action Requested: Summary for Provider     []  Critical Lab, Recommendations Needed  [] Need Additional Advice  []   FYI    []   Need Orders  [] Need Medications Sent to Pharmacy  []  Other     SUMMARY: Protocol disposition states office visit is appropria

## 2020-04-25 ENCOUNTER — VIRTUAL PHONE E/M (OUTPATIENT)
Dept: FAMILY MEDICINE CLINIC | Facility: CLINIC | Age: 55
End: 2020-04-25
Payer: MEDICAID

## 2020-04-25 DIAGNOSIS — G43.009 NONINTRACTABLE MIGRAINE, UNSPECIFIED MIGRAINE TYPE: Primary | ICD-10-CM

## 2020-04-25 PROCEDURE — 99214 OFFICE O/P EST MOD 30 MIN: CPT | Performed by: FAMILY MEDICINE

## 2020-04-25 RX ORDER — SUMATRIPTAN 50 MG/1
TABLET, FILM COATED ORAL
Qty: 12 TABLET | Refills: 1 | Status: SHIPPED | OUTPATIENT
Start: 2020-04-25 | End: 2020-05-27

## 2020-04-25 NOTE — PROGRESS NOTES
Virtual Telephone Check-In    Chilango Martin verbally consents to a Virtual/Telephone Check-In visit on 04/25/20. Patient understands and accepts financial responsibility for any deductible, co-insurance and/or co-pays associated with this service.     Du

## 2020-05-11 ENCOUNTER — VIRTUAL PHONE E/M (OUTPATIENT)
Dept: FAMILY MEDICINE CLINIC | Facility: CLINIC | Age: 55
End: 2020-05-11
Payer: MEDICAID

## 2020-05-11 DIAGNOSIS — R51.9 HEADACHE DISORDER: Primary | ICD-10-CM

## 2020-05-11 PROCEDURE — 99213 OFFICE O/P EST LOW 20 MIN: CPT | Performed by: FAMILY MEDICINE

## 2020-05-11 RX ORDER — ACETAMINOPHEN AND CODEINE PHOSPHATE 300; 30 MG/1; MG/1
1 TABLET ORAL EVERY 6 HOURS PRN
Qty: 30 TABLET | Refills: 0 | Status: SHIPPED | OUTPATIENT
Start: 2020-05-11 | End: 2020-05-27

## 2020-05-11 NOTE — PROGRESS NOTES
HPI:    Patient ID: Julien Kim is a 63year old female. Virtual Telephone Check-In    Julien Kim verbally consents to a Virtual/Telephone Check-In visit on 05/11/20.     Patient understands and accepts financial responsibility for any deductible, c X 4 MM Does not apply Misc To inject victoza once a day 100 each 0   • fluticasone-salmeterol (ADVAIR DISKUS) 250-50 MCG/DOSE Inhalation Aerosol Powder, Breath Activated Inhale 1 puff into the lungs every 12 (twelve) hours.  (Patient not taking: Reported on in this encounter. Meds This Visit:  Requested Prescriptions     Signed Prescriptions Disp Refills   • Acetaminophen-Codeine #3 300-30 MG Oral Tab 30 tablet 0     Sig: Take 1 tablet by mouth every 6 (six) hours as needed for Pain.  Medication may cause

## 2020-05-12 ENCOUNTER — TELEPHONE (OUTPATIENT)
Dept: FAMILY MEDICINE CLINIC | Facility: CLINIC | Age: 55
End: 2020-05-12

## 2020-05-12 DIAGNOSIS — M79.604 RIGHT LEG PAIN: Primary | ICD-10-CM

## 2020-05-23 ENCOUNTER — HOSPITAL ENCOUNTER (EMERGENCY)
Facility: HOSPITAL | Age: 55
Discharge: HOME | End: 2020-05-23
Attending: EMERGENCY MEDICINE
Payer: MEDICAID

## 2020-05-23 VITALS
OXYGEN SATURATION: 98 % | RESPIRATION RATE: 18 BRPM | HEART RATE: 85 BPM | DIASTOLIC BLOOD PRESSURE: 78 MMHG | SYSTOLIC BLOOD PRESSURE: 117 MMHG | TEMPERATURE: 99 F | BODY MASS INDEX: 32 KG/M2 | WEIGHT: 196 LBS

## 2020-05-23 DIAGNOSIS — R51.9 CHRONIC NONINTRACTABLE HEADACHE, UNSPECIFIED HEADACHE TYPE: Primary | ICD-10-CM

## 2020-05-23 DIAGNOSIS — G89.29 CHRONIC NONINTRACTABLE HEADACHE, UNSPECIFIED HEADACHE TYPE: Primary | ICD-10-CM

## 2020-05-23 PROCEDURE — 99283 EMERGENCY DEPT VISIT LOW MDM: CPT

## 2020-05-23 PROCEDURE — 96372 THER/PROPH/DIAG INJ SC/IM: CPT

## 2020-05-23 RX ORDER — DIPHENHYDRAMINE HCL 25 MG
50 CAPSULE ORAL ONCE
Status: COMPLETED | OUTPATIENT
Start: 2020-05-23 | End: 2020-05-23

## 2020-05-23 RX ORDER — CAFFEINE 200 MG
200 TABLET ORAL EVERY 4 HOURS PRN
Status: DISCONTINUED | OUTPATIENT
Start: 2020-05-23 | End: 2020-05-23

## 2020-05-23 RX ORDER — BUTALBITAL, ACETAMINOPHEN AND CAFFEINE 50; 325; 40 MG/1; MG/1; MG/1
1-2 TABLET ORAL EVERY 4 HOURS PRN
Qty: 20 TABLET | Refills: 0 | Status: SHIPPED | OUTPATIENT
Start: 2020-05-23 | End: 2020-09-09

## 2020-05-23 RX ORDER — KETOROLAC TROMETHAMINE 30 MG/ML
30 INJECTION, SOLUTION INTRAMUSCULAR; INTRAVENOUS ONCE
Status: COMPLETED | OUTPATIENT
Start: 2020-05-23 | End: 2020-05-23

## 2020-05-23 NOTE — ED INITIAL ASSESSMENT (HPI)
Patient aox3 to ed via private vheicle patient co of migraine type headaches x 1 month, given medications by pcp without relief. Now pain worsening x 2 days radiating down bilateral neck. Denies n/v denies dizziness pain 10/10+photophobia.

## 2020-05-24 NOTE — ED PROVIDER NOTES
Patient Seen in: [facility] Emergency Department    History   Patient presents with:  Headache      HPI    Patient presents to the ED complaining of a headache for the past 1 month.   She states headache started gradually and bilateral forehead are Temp src 05/23/20 1628 Oral   SpO2 05/23/20 1627 98 %   O2 Device 05/23/20 1627 None (Room air)       All measures to prevent infection transmission during my interaction with the patient were taken.  The patient was already wearing a droplet mask on my a (50 mg Oral Given 5/23/20 1749)   ketorolac tromethamine (TORADOL) 30 MG/ML injection 30 mg (30 mg Intramuscular Given 5/23/20 1749)         MDM      05/23/20  1627 05/23/20  1628 05/23/20  1745 05/23/20  1857   BP:  121/73 112/69 117/78   Pulse: 83  84 85 -40 MG Oral Tab  Take 1-2 tablets by mouth every 4 (four) hours as needed for Headaches., Normal, Disp-20 tablet, R-0

## 2020-05-26 RX ORDER — IBUPROFEN 800 MG/1
TABLET ORAL
Qty: 90 TABLET | Refills: 1 | OUTPATIENT
Start: 2020-05-26

## 2020-05-27 ENCOUNTER — OFFICE VISIT (OUTPATIENT)
Dept: NEUROLOGY | Facility: CLINIC | Age: 55
End: 2020-05-27
Payer: MEDICAID

## 2020-05-27 VITALS — WEIGHT: 196 LBS | HEIGHT: 65 IN | BODY MASS INDEX: 32.65 KG/M2

## 2020-05-27 DIAGNOSIS — R51.9 NEW ONSET HEADACHE: Primary | ICD-10-CM

## 2020-05-27 PROCEDURE — 99204 OFFICE O/P NEW MOD 45 MIN: CPT | Performed by: OTHER

## 2020-05-27 RX ORDER — AMITRIPTYLINE HYDROCHLORIDE 10 MG/1
TABLET, FILM COATED ORAL
Qty: 90 TABLET | Refills: 3 | Status: SHIPPED | OUTPATIENT
Start: 2020-05-27 | End: 2020-09-09

## 2020-05-27 RX ORDER — IBUPROFEN 600 MG/1
1 TABLET ORAL AS NEEDED
COMMUNITY
Start: 2020-03-04 | End: 2020-07-30

## 2020-05-27 NOTE — PROGRESS NOTES
Neurology Initial Visit     Referred By: Dr. Gillette ref. provider found    Chief Complaint: Patient presents with:  Headache: Patient presents today with daughter who translates. She states that she started to experience headaches about 1 month ago.  She stat apply route daily.  Compression Stockings, Disp: 2 each, Rfl: 11  •  Insulin Pen Needle 32G X 4 MM Does not apply Misc, To inject victoza once a day, Disp: 100 each, Rfl: 0  •  fluticasone-salmeterol (ADVAIR DISKUS) 250-50 MCG/DOSE Inhalation Aerosol Powder Nerves:  II.- Visual fields full to confrontation        Fundoscopically- No papilledema or retinal hemorrhages. Normal optic discs, sharp edges. III, IV, VI- EOM intact, MERLENE  V. Facial sensation intact  VII. Face symmetric, no facial weakness  VIII.  He provided to patient. Instructed patient to call my office or seek medical attention immediately if symptoms worsen. Patient verbalized understanding of information given. All questions were answered. All side effects of drugs were discussed.      Return to

## 2020-05-28 ENCOUNTER — TELEPHONE (OUTPATIENT)
Dept: NEUROLOGY | Facility: CLINIC | Age: 55
End: 2020-05-28

## 2020-05-28 NOTE — TELEPHONE ENCOUNTER
[employer] for authorization of approval for MRI brain w/wo cpt code 02811. Approval was given with Authorization Number: [id number] effective 05/28/20 to 11/24/20. Will call Pt. To inform. L/m advising authorization is not required.  Can proceed wi

## 2020-06-09 ENCOUNTER — TELEPHONE (OUTPATIENT)
Dept: FAMILY MEDICINE CLINIC | Facility: CLINIC | Age: 55
End: 2020-06-09

## 2020-06-09 DIAGNOSIS — Z86.72 HISTORY OF THROMBOPHLEBITIS: Primary | ICD-10-CM

## 2020-06-09 NOTE — TELEPHONE ENCOUNTER
Patient's daughter is asking Nisha Grajeda a new prescription for a compression stocking to be sent to [facility].  Their fax number is [phone]

## 2020-07-30 ENCOUNTER — LAB ENCOUNTER (OUTPATIENT)
Dept: LAB | Age: 55
End: 2020-07-30
Attending: STUDENT IN AN ORGANIZED HEALTH CARE EDUCATION/TRAINING PROGRAM
Payer: MEDICAID

## 2020-07-30 ENCOUNTER — OFFICE VISIT (OUTPATIENT)
Dept: FAMILY MEDICINE CLINIC | Facility: CLINIC | Age: 55
End: 2020-07-30
Payer: MEDICAID

## 2020-07-30 VITALS
HEART RATE: 78 BPM | DIASTOLIC BLOOD PRESSURE: 68 MMHG | HEIGHT: 65 IN | WEIGHT: 196 LBS | TEMPERATURE: 98 F | SYSTOLIC BLOOD PRESSURE: 111 MMHG | BODY MASS INDEX: 32.65 KG/M2

## 2020-07-30 DIAGNOSIS — M79.671 ACUTE FOOT PAIN, RIGHT: ICD-10-CM

## 2020-07-30 DIAGNOSIS — E11.65 TYPE 2 DIABETES MELLITUS WITH HYPERGLYCEMIA, UNSPECIFIED WHETHER LONG TERM INSULIN USE (HCC): ICD-10-CM

## 2020-07-30 DIAGNOSIS — M25.571 CHRONIC PAIN OF RIGHT ANKLE: Primary | ICD-10-CM

## 2020-07-30 DIAGNOSIS — G89.29 CHRONIC PAIN OF RIGHT ANKLE: Primary | ICD-10-CM

## 2020-07-30 LAB
ALBUMIN SERPL-MCNC: 3.6 G/DL (ref 3.4–5)
ALBUMIN/GLOB SERPL: 0.8 {RATIO} (ref 1–2)
ALP LIVER SERPL-CCNC: 78 U/L (ref 46–118)
ALT SERPL-CCNC: 27 U/L (ref 13–56)
ANION GAP SERPL CALC-SCNC: 5 MMOL/L (ref 0–18)
AST SERPL-CCNC: 9 U/L (ref 15–37)
BASOPHILS # BLD AUTO: 0.04 X10(3) UL (ref 0–0.2)
BASOPHILS NFR BLD AUTO: 0.7 %
BILIRUB SERPL-MCNC: 0.5 MG/DL (ref 0.1–2)
BUN BLD-MCNC: 14 MG/DL (ref 7–18)
BUN/CREAT SERPL: 16.3 (ref 10–20)
CALCIUM BLD-MCNC: 9.6 MG/DL (ref 8.5–10.1)
CHLORIDE SERPL-SCNC: 107 MMOL/L (ref 98–112)
CHOLEST SMN-MCNC: 246 MG/DL (ref ?–200)
CO2 SERPL-SCNC: 27 MMOL/L (ref 21–32)
CREAT BLD-MCNC: 0.86 MG/DL (ref 0.55–1.02)
CREAT UR-SCNC: 35.7 MG/DL
DEPRECATED RDW RBC AUTO: 40.9 FL (ref 35.1–46.3)
EOSINOPHIL # BLD AUTO: 0.19 X10(3) UL (ref 0–0.7)
EOSINOPHIL NFR BLD AUTO: 3.3 %
ERYTHROCYTE [DISTWIDTH] IN BLOOD BY AUTOMATED COUNT: 12.5 % (ref 11–15)
EST. AVERAGE GLUCOSE BLD GHB EST-MCNC: 166 MG/DL (ref 68–126)
GLOBULIN PLAS-MCNC: 4.3 G/DL (ref 2.8–4.4)
GLUCOSE BLD-MCNC: 219 MG/DL (ref 70–99)
HBA1C MFR BLD HPLC: 7.4 % (ref ?–5.7)
HCT VFR BLD AUTO: 39.7 % (ref 35–48)
HDLC SERPL-MCNC: 53 MG/DL (ref 40–59)
HGB BLD-MCNC: 13.5 G/DL (ref 12–16)
IMM GRANULOCYTES # BLD AUTO: 0.02 X10(3) UL (ref 0–1)
IMM GRANULOCYTES NFR BLD: 0.3 %
LDLC SERPL CALC-MCNC: 157 MG/DL (ref ?–100)
LYMPHOCYTES # BLD AUTO: 1.71 X10(3) UL (ref 1–4)
LYMPHOCYTES NFR BLD AUTO: 29.8 %
M PROTEIN MFR SERPL ELPH: 7.9 G/DL (ref 6.4–8.2)
MCH RBC QN AUTO: 30.6 PG (ref 26–34)
MCHC RBC AUTO-ENTMCNC: 34 G/DL (ref 31–37)
MCV RBC AUTO: 90 FL (ref 80–100)
MICROALBUMIN UR-MCNC: <0.5 MG/DL
MONOCYTES # BLD AUTO: 0.39 X10(3) UL (ref 0.1–1)
MONOCYTES NFR BLD AUTO: 6.8 %
NEUTROPHILS # BLD AUTO: 3.38 X10 (3) UL (ref 1.5–7.7)
NEUTROPHILS # BLD AUTO: 3.38 X10(3) UL (ref 1.5–7.7)
NEUTROPHILS NFR BLD AUTO: 59.1 %
NONHDLC SERPL-MCNC: 193 MG/DL (ref ?–130)
OSMOLALITY SERPL CALC.SUM OF ELEC: 295 MOSM/KG (ref 275–295)
PATIENT FASTING Y/N/NP: NO
PATIENT FASTING Y/N/NP: NO
PLATELET # BLD AUTO: 266 10(3)UL (ref 150–450)
POTASSIUM SERPL-SCNC: 4.1 MMOL/L (ref 3.5–5.1)
RBC # BLD AUTO: 4.41 X10(6)UL (ref 3.8–5.3)
SODIUM SERPL-SCNC: 139 MMOL/L (ref 136–145)
TRIGL SERPL-MCNC: 181 MG/DL (ref 30–149)
URATE SERPL-MCNC: 5.2 MG/DL (ref 2.6–6)
VLDLC SERPL CALC-MCNC: 36 MG/DL (ref 0–30)
WBC # BLD AUTO: 5.7 X10(3) UL (ref 4–11)

## 2020-07-30 PROCEDURE — 83036 HEMOGLOBIN GLYCOSYLATED A1C: CPT

## 2020-07-30 PROCEDURE — 82043 UR ALBUMIN QUANTITATIVE: CPT

## 2020-07-30 PROCEDURE — 84550 ASSAY OF BLOOD/URIC ACID: CPT

## 2020-07-30 PROCEDURE — 3078F DIAST BP <80 MM HG: CPT | Performed by: STUDENT IN AN ORGANIZED HEALTH CARE EDUCATION/TRAINING PROGRAM

## 2020-07-30 PROCEDURE — 99214 OFFICE O/P EST MOD 30 MIN: CPT | Performed by: STUDENT IN AN ORGANIZED HEALTH CARE EDUCATION/TRAINING PROGRAM

## 2020-07-30 PROCEDURE — 36415 COLL VENOUS BLD VENIPUNCTURE: CPT

## 2020-07-30 PROCEDURE — 3074F SYST BP LT 130 MM HG: CPT | Performed by: STUDENT IN AN ORGANIZED HEALTH CARE EDUCATION/TRAINING PROGRAM

## 2020-07-30 PROCEDURE — 82570 ASSAY OF URINE CREATININE: CPT

## 2020-07-30 PROCEDURE — 80053 COMPREHEN METABOLIC PANEL: CPT

## 2020-07-30 PROCEDURE — 80061 LIPID PANEL: CPT

## 2020-07-30 PROCEDURE — 3008F BODY MASS INDEX DOCD: CPT | Performed by: STUDENT IN AN ORGANIZED HEALTH CARE EDUCATION/TRAINING PROGRAM

## 2020-07-30 PROCEDURE — 85025 COMPLETE CBC W/AUTO DIFF WBC: CPT

## 2020-07-30 RX ORDER — CALCIUM CITRATE/VITAMIN D3 200MG-6.25
TABLET ORAL
Qty: 100 STRIP | Refills: 1 | Status: SHIPPED | OUTPATIENT
Start: 2020-07-30 | End: 2021-09-10

## 2020-07-30 RX ORDER — IBUPROFEN 600 MG/1
600 TABLET ORAL EVERY 6 HOURS PRN
Qty: 120 TABLET | Refills: 0 | Status: SHIPPED | OUTPATIENT
Start: 2020-07-30 | End: 2020-08-29

## 2020-07-30 RX ORDER — LISINOPRIL 5 MG/1
5 TABLET ORAL DAILY
Qty: 90 TABLET | Refills: 3 | Status: SHIPPED | OUTPATIENT
Start: 2020-07-30

## 2020-07-30 RX ORDER — GLUCOSAM/CHON-MSM1/C/MANG/BOSW 500-416.6
TABLET ORAL
Qty: 1 BOX | Refills: 1 | Status: SHIPPED | OUTPATIENT
Start: 2020-07-30 | End: 2021-05-07

## 2020-07-30 NOTE — PROGRESS NOTES
HPI:    Patient ID: Loren Le is a 63year old female. HPI  Pt presenting with Right foot and ankle pain. H/o Right ankle tendon repair surgery June 2019. For the last 2 months, notes medial foot pain and foot numbness extending into ankle.  Pain is • albuterol sulfate (2.5 MG/3ML) 0.083% Inhalation Nebu Soln Take 3 mL (2.5 mg total) by nebulization every 6 (six) hours as needed for Wheezing.  75 vial 1   • NEBULIZER SYSTEM ALL-IN-ONE Does not apply Misc 1 Units by Does not apply route 3 (three) times Breath sounds: Normal breath sounds. No wheezing, rhonchi or rales. Abdominal:      General: Bowel sounds are normal.      Palpations: Abdomen is soft. Tenderness: There is no tenderness. There is no guarding or rebound.    Musculoskeletal:      R - ibuprofen 600 MG Oral Tab; Take 1 tablet (600 mg total) by mouth every 6 (six) hours as needed. Dispense: 120 tablet; Refill: 0    2.  Acute foot pain, right  - will check labs and imaging below  - URIC ACID, SERUM; Future  - CBC WITH DIFFERENTIAL WITH P Sig: Take 1 tablet (600 mg total) by mouth every 6 (six) hours as needed.        Imaging & Referrals:  XR ANKLE (MIN 3 VIEWS), RIGHT (CPT=28330)  XR FOOT, COMPLETE (MIN 3 VIEWS), RIGHT (CPT=76160)         #[id number]

## 2020-08-21 ENCOUNTER — OFFICE VISIT (OUTPATIENT)
Dept: FAMILY MEDICINE CLINIC | Facility: CLINIC | Age: 55
End: 2020-08-21
Payer: MEDICAID

## 2020-08-21 VITALS
HEART RATE: 82 BPM | DIASTOLIC BLOOD PRESSURE: 57 MMHG | HEIGHT: 65 IN | WEIGHT: 195 LBS | SYSTOLIC BLOOD PRESSURE: 108 MMHG | BODY MASS INDEX: 32.49 KG/M2

## 2020-08-21 DIAGNOSIS — E11.65 TYPE 2 DIABETES MELLITUS WITH HYPERGLYCEMIA, UNSPECIFIED WHETHER LONG TERM INSULIN USE (HCC): ICD-10-CM

## 2020-08-21 DIAGNOSIS — R92.8 ABNORMAL MAMMOGRAM: Primary | ICD-10-CM

## 2020-08-21 DIAGNOSIS — Z12.11 ENCOUNTER FOR SCREENING COLONOSCOPY: ICD-10-CM

## 2020-08-21 PROCEDURE — 3074F SYST BP LT 130 MM HG: CPT | Performed by: FAMILY MEDICINE

## 2020-08-21 PROCEDURE — 3008F BODY MASS INDEX DOCD: CPT | Performed by: FAMILY MEDICINE

## 2020-08-21 PROCEDURE — 90471 IMMUNIZATION ADMIN: CPT | Performed by: FAMILY MEDICINE

## 2020-08-21 PROCEDURE — 90732 PPSV23 VACC 2 YRS+ SUBQ/IM: CPT | Performed by: FAMILY MEDICINE

## 2020-08-21 PROCEDURE — 3078F DIAST BP <80 MM HG: CPT | Performed by: FAMILY MEDICINE

## 2020-08-21 PROCEDURE — 99214 OFFICE O/P EST MOD 30 MIN: CPT | Performed by: FAMILY MEDICINE

## 2020-08-21 RX ORDER — TERBINAFINE HYDROCHLORIDE 250 MG/1
250 TABLET ORAL DAILY
COMMUNITY
Start: 2020-06-15

## 2020-08-21 RX ORDER — GLIMEPIRIDE 4 MG/1
4 TABLET ORAL
Qty: 90 TABLET | Refills: 1 | Status: SHIPPED | OUTPATIENT
Start: 2020-08-21 | End: 2020-11-06

## 2020-08-21 NOTE — PATIENT INSTRUCTIONS
La colonoscopia     A camera attached to a flexible tube with a viewing lens is used to take video pictures.    La colonoscopia es jet prueba que permite mirar el interior del aparato digestivo inferior (el colon y el recto). Algunas veces se puede mirar Es posible que reddy médico le informe los [place], o en jet visita posterior. Sanjuanita la prueba   La prueba generalmente se realiza en el hospital eber paciente ambulatorio o en jet clínica ambulatoria.  Eso significa que puede volver a reddy ca Hemoglobina A1c, HbA1c, HgbA1C, hemoglobina glucosilada, glicohemoglobina, hemoglobina glicosilada. ¿Qué es esta prueba? La prueba A1C es un análisis de Jason goss que se [place] para evaluar a las personas, a fin de averiguar si tienen diabetes o prediabetes.  Trudee Ruperto Según el tipo de diabetes que tenga, qué villarreal katie esté controlada y las preferencias de reddy proveedor de atención médica, es posible que necesite realizarse la prueba A1C dos veces al año o [place].  La American Diabetes Association ([address] de la D · Se considera que el nivel normal de hemoglobina A1C debe ser inferior al 5.7%. [employer] el 5.7% y el 6.4% pueden significar que tiene prediabetes. [place] significa que usted tiene un riesgo más alto de desarrollar diabetes.   · [address] © 1315-9535 [address]. [address] [facility], [address] [facility]. Todos los derechos reservados. Esta información no pretende sustituir la atención médica profesional. Sólo reddy médico puede diagnosticar y tratar un problema de enedina.

## 2020-08-21 NOTE — PROGRESS NOTES
Blood pressure 108/57, pulse 82, height 5' 5\" (1.651 m), weight 195 lb (88.5 kg). Patient presents today following up for diabetes. She has had diabetes for 3 years. Had an eye exam 2 months ago dilated was fine. Denies hypoglycemia.   Checks sugar t

## 2020-08-28 ENCOUNTER — HOSPITAL ENCOUNTER (OUTPATIENT)
Dept: GENERAL RADIOLOGY | Facility: HOSPITAL | Age: 55
Discharge: HOME | End: 2020-08-28
Attending: STUDENT IN AN ORGANIZED HEALTH CARE EDUCATION/TRAINING PROGRAM
Payer: MEDICAID

## 2020-08-28 DIAGNOSIS — G89.29 CHRONIC PAIN OF RIGHT ANKLE: ICD-10-CM

## 2020-08-28 DIAGNOSIS — M25.571 CHRONIC PAIN OF RIGHT ANKLE: ICD-10-CM

## 2020-08-28 DIAGNOSIS — M79.671 ACUTE FOOT PAIN, RIGHT: ICD-10-CM

## 2020-08-28 PROCEDURE — 73610 X-RAY EXAM OF ANKLE: CPT | Performed by: STUDENT IN AN ORGANIZED HEALTH CARE EDUCATION/TRAINING PROGRAM

## 2020-08-28 PROCEDURE — 73630 X-RAY EXAM OF FOOT: CPT | Performed by: STUDENT IN AN ORGANIZED HEALTH CARE EDUCATION/TRAINING PROGRAM

## 2020-08-31 DIAGNOSIS — M65.28 CALCIFIC ACHILLES TENDONITIS: Primary | ICD-10-CM

## 2020-09-09 ENCOUNTER — OFFICE VISIT (OUTPATIENT)
Dept: NEUROLOGY | Facility: CLINIC | Age: 55
End: 2020-09-09
Payer: MEDICAID

## 2020-09-09 VITALS — WEIGHT: 192 LBS | HEIGHT: 65 IN | BODY MASS INDEX: 31.99 KG/M2

## 2020-09-09 DIAGNOSIS — Z86.72 HISTORY OF THROMBOPHLEBITIS: ICD-10-CM

## 2020-09-09 DIAGNOSIS — R20.2 NUMBNESS AND TINGLING OF RIGHT LEG: Primary | ICD-10-CM

## 2020-09-09 DIAGNOSIS — S86.011A PARTIAL TEAR OF RIGHT ACHILLES TENDON, INITIAL ENCOUNTER: ICD-10-CM

## 2020-09-09 DIAGNOSIS — L97.919 DIABETIC ULCER OF RIGHT LOWER LEG (HCC): ICD-10-CM

## 2020-09-09 DIAGNOSIS — E11.622 DIABETIC ULCER OF RIGHT LOWER LEG (HCC): ICD-10-CM

## 2020-09-09 DIAGNOSIS — R20.0 NUMBNESS AND TINGLING OF RIGHT LEG: Primary | ICD-10-CM

## 2020-09-09 PROCEDURE — 99244 OFF/OP CNSLTJ NEW/EST MOD 40: CPT | Performed by: PHYSICAL MEDICINE & REHABILITATION

## 2020-09-09 PROCEDURE — 3008F BODY MASS INDEX DOCD: CPT | Performed by: PHYSICAL MEDICINE & REHABILITATION

## 2020-09-09 RX ORDER — LIRAGLUTIDE 6 MG/ML
1.8 INJECTION SUBCUTANEOUS DAILY
COMMUNITY
Start: 2020-08-31 | End: 2020-11-04

## 2020-09-09 NOTE — PATIENT INSTRUCTIONS
-EMG test to be completed of bilateral legs  -Will discuss further treatment options after EMG testing  -Start physical therapy and home exercises  -Get night splints to be worn at nighttime  -Continue icing the area of discomfort

## 2020-09-09 NOTE — PROGRESS NOTES
[address] [facility]  NEW PATIENT EVALUATION    Consultation as a request of Dr. Patrick Cordero    Chief Complaint: back pain. HISTORY OF PRESENT ILLNESS:   Patient presents with:   Foot Pain: Patient presents today with persistant and worse intermittently with weightbearing  Pain: Today 6/10 Worst 10/10     She reports nocturnal pain   She reports numbness/tingling  She reports weakness with instability  She denies bowel/bladder incontinence  She denies Fevers, Chills and every 6 (six) hours as needed for Wheezing. 75 vial 1         ALLERGIES:     Fish-Derived Produc*    HIVES, WHEEZING  Shellfish-Derived P*    HIVES, WHEEZING      FAMILY HISTORY:   History reviewed. No pertinent family history.        SOCIAL HISTORY:   Soci attentive, able to follow 2 step commands, comprehention intact, spontaneous speech intact  Psychiatric: Mood and affect appropriate    Gait patient has a hard time rising on the right toes with plantar flexion and pain with this    Musculoskeletal/Neurolo concerning for a partial tear as detailed above. X-Ray ANKLE/FOOT dated August 28, 2020 revealed:   =====  CONCLUSION:   1. No acute osseous abnormalities. All imaging results were reviewed and discussed with patient. ASSESSMENT:     1.  Gena Horn learning. Jonathan LITTLE. [address] [facility]  Physical Medicine and Rehabilitation/Sports Medicine

## 2020-09-10 ENCOUNTER — TELEPHONE (OUTPATIENT)
Dept: FAMILY MEDICINE CLINIC | Facility: CLINIC | Age: 55
End: 2020-09-10

## 2020-09-10 ENCOUNTER — TELEPHONE (OUTPATIENT)
Dept: NEUROLOGY | Facility: CLINIC | Age: 55
End: 2020-09-10

## 2020-09-10 NOTE — TELEPHONE ENCOUNTER
MRI LOWER LEG, RIGHT (CPT=73718)- pending approval  Called [employer]  for authorization for approval for above. Spoke to Taya Allen  who initiated request and states clinical notes are required for clinical review. Will fax clinical notes.   Case # [id number]  P

## 2020-09-14 NOTE — TELEPHONE ENCOUNTER
MRI LOWER LEG, RIGHT(CPT=73718)-DENIED  The reason is, \" Based on [employer] Musculoskeletal Imaging Section : MS 11.2 Acute Compartment Syndrome, we cannot approve this request. Your records show that you have pain in your leg.  The reason this request sukumar

## 2020-09-15 NOTE — TELEPHONE ENCOUNTER
Called [employer] to schedule a peer to peer. Spoke to Hung LITTLE who states peer to peer is scheduled for CASE# [id number] at 12:30 today (8/15/2020) with Dr. Jena Castillo.    Will inform Dr. Johnston Proper

## 2020-09-15 NOTE — TELEPHONE ENCOUNTER
MRI LOWER LEG, RIGHT (CPT=73718)-APPROVED  Called [employer] for status of authorization. Per automated system, request above is authorized. Authorization # [id number] effective dates: 9/15/2020 thru 3/14/2021. Will inform patient.    Called patient informed i

## 2020-09-16 NOTE — TELEPHONE ENCOUNTER
Patient would like to have her order changed to an Open MRI. She is claustrophobic. Please process her request.  Will Dr Kevin Rodriguez approve this? Please call patient back to advise so she can schedule the MRI.     Uzbek speaking patient

## 2020-09-17 NOTE — TELEPHONE ENCOUNTER
Spoke to patient and she has been notified that she can use the same order to have open MRI done. Patient verbalized understanding.

## 2020-10-03 ENCOUNTER — HOSPITAL ENCOUNTER (OUTPATIENT)
Dept: MRI IMAGING | Age: 55
Discharge: HOME | End: 2020-10-03
Attending: PHYSICAL MEDICINE & REHABILITATION
Payer: MEDICAID

## 2020-10-03 DIAGNOSIS — R20.2 NUMBNESS AND TINGLING OF RIGHT LEG: ICD-10-CM

## 2020-10-03 DIAGNOSIS — R20.0 NUMBNESS AND TINGLING OF RIGHT LEG: ICD-10-CM

## 2020-10-03 PROCEDURE — 73718 MRI LOWER EXTREMITY W/O DYE: CPT | Performed by: PHYSICAL MEDICINE & REHABILITATION

## 2020-10-06 ENCOUNTER — TELEPHONE (OUTPATIENT)
Dept: NEUROLOGY | Facility: CLINIC | Age: 55
End: 2020-10-06

## 2020-10-06 NOTE — TELEPHONE ENCOUNTER
----- Message from Verito Macias DO sent at 10/6/2020  9:04 AM CDT -----  MRI of the leg shows changes related to the patient's ulcer with no significant other findings. Please have her follow up as discussed.  Thanks

## 2020-10-06 NOTE — TELEPHONE ENCOUNTER
Dr. Duard Snellen patient. Left detailed message for patient with information below. Asked her to call the office if she had questions. Follow-up on 10/9/20.

## 2020-10-09 ENCOUNTER — PROCEDURE VISIT (OUTPATIENT)
Dept: NEUROLOGY | Facility: CLINIC | Age: 55
End: 2020-10-09
Payer: MEDICAID

## 2020-10-09 DIAGNOSIS — R20.2 NUMBNESS AND TINGLING OF RIGHT LEG: Primary | ICD-10-CM

## 2020-10-09 DIAGNOSIS — R20.0 NUMBNESS AND TINGLING OF RIGHT LEG: Primary | ICD-10-CM

## 2020-10-09 PROCEDURE — 95911 NRV CNDJ TEST 9-10 STUDIES: CPT | Performed by: PHYSICAL MEDICINE & REHABILITATION

## 2020-10-09 PROCEDURE — 95886 MUSC TEST DONE W/N TEST COMP: CPT | Performed by: PHYSICAL MEDICINE & REHABILITATION

## 2020-10-09 NOTE — PROCEDURES
[address]   Nerve Conduction Study and Electromyography Procedure Note    Patient: Chung Boone Hand Dominance:  right   Patient ID: [id number] Referring Dr:  Dr. Rebecca Lundborg   Sex: Female Test Dr:  Dr. Rachna García L. Vastus medialis Femoral L2-L4 N None None None None Normal N N N N   L. Gastrocnemius (Medial head) Tibial S1-S2 N None None None None Normal N N N N   L. Peroneus longus Peroneal L5-S1 N None None None None Normal N N N N   L.  Tibialis anterior Deep pe 1. There is no evidence of a BILATERAL lumbosacral plexopathy, myopathy, peripheral neuropathy or radiculopathy in this study. Thank you for allowing us to participate in the care of your patient.     Raheel Reed DO, FAAPMR & CAQSM  Physical Medicine a

## 2020-10-13 ENCOUNTER — NURSE TRIAGE (OUTPATIENT)
Dept: FAMILY MEDICINE CLINIC | Facility: CLINIC | Age: 55
End: 2020-10-13

## 2020-10-13 ENCOUNTER — VIRTUAL PHONE E/M (OUTPATIENT)
Dept: FAMILY MEDICINE CLINIC | Facility: CLINIC | Age: 55
End: 2020-10-13
Payer: MEDICAID

## 2020-10-13 DIAGNOSIS — Z12.31 SCREENING MAMMOGRAM, ENCOUNTER FOR: Primary | ICD-10-CM

## 2020-10-13 RX ORDER — ALBUTEROL SULFATE 90 UG/1
2 AEROSOL, METERED RESPIRATORY (INHALATION) EVERY 6 HOURS PRN
Qty: 1 INHALER | Refills: 3 | Status: SHIPPED | OUTPATIENT
Start: 2020-10-13 | End: 2021-10-13

## 2020-10-13 NOTE — PROGRESS NOTES
Virtual Telephone Check-In    Angel Lockett verbally consents to a Virtual/Telephone Check-In visit on 10/13/20. Patient has been referred to the [facility] website at [email or web address] to review the yearly Consent to Treat document.     Patient Biju Logan

## 2020-10-16 ENCOUNTER — TELEPHONE (OUTPATIENT)
Dept: PHYSICAL THERAPY | Facility: HOSPITAL | Age: 55
End: 2020-10-16

## 2020-10-16 NOTE — TELEPHONE ENCOUNTER
Patient's dtr was called regarding mom's no show after attempted to reach patient who only speaks Farhana Bill and stated she would need help to discuss schedule.   Was able to reach dtr and requested of dtr to ask patient if she intended to attend PT and next a

## 2020-10-18 ENCOUNTER — HOSPITAL ENCOUNTER (EMERGENCY)
Facility: HOSPITAL | Age: 55
Discharge: HOME | End: 2020-10-18
Attending: EMERGENCY MEDICINE
Payer: MEDICAID

## 2020-10-18 VITALS
OXYGEN SATURATION: 96 % | DIASTOLIC BLOOD PRESSURE: 70 MMHG | HEART RATE: 78 BPM | TEMPERATURE: 100 F | RESPIRATION RATE: 18 BRPM | WEIGHT: 190 LBS | BODY MASS INDEX: 31.65 KG/M2 | SYSTOLIC BLOOD PRESSURE: 135 MMHG | HEIGHT: 65 IN

## 2020-10-18 DIAGNOSIS — N39.0 URINARY TRACT INFECTION WITHOUT HEMATURIA, SITE UNSPECIFIED: Primary | ICD-10-CM

## 2020-10-18 PROCEDURE — 99284 EMERGENCY DEPT VISIT MOD MDM: CPT

## 2020-10-18 PROCEDURE — 96361 HYDRATE IV INFUSION ADD-ON: CPT

## 2020-10-18 PROCEDURE — 87086 URINE CULTURE/COLONY COUNT: CPT | Performed by: EMERGENCY MEDICINE

## 2020-10-18 PROCEDURE — 85025 COMPLETE CBC W/AUTO DIFF WBC: CPT | Performed by: EMERGENCY MEDICINE

## 2020-10-18 PROCEDURE — 81001 URINALYSIS AUTO W/SCOPE: CPT | Performed by: EMERGENCY MEDICINE

## 2020-10-18 PROCEDURE — 87077 CULTURE AEROBIC IDENTIFY: CPT | Performed by: EMERGENCY MEDICINE

## 2020-10-18 PROCEDURE — 80048 BASIC METABOLIC PNL TOTAL CA: CPT | Performed by: EMERGENCY MEDICINE

## 2020-10-18 PROCEDURE — 96360 HYDRATION IV INFUSION INIT: CPT

## 2020-10-18 PROCEDURE — 87186 SC STD MICRODIL/AGAR DIL: CPT | Performed by: EMERGENCY MEDICINE

## 2020-10-18 PROCEDURE — 80076 HEPATIC FUNCTION PANEL: CPT | Performed by: EMERGENCY MEDICINE

## 2020-10-18 PROCEDURE — 83690 ASSAY OF LIPASE: CPT | Performed by: EMERGENCY MEDICINE

## 2020-10-18 RX ORDER — NITROFURANTOIN 25; 75 MG/1; MG/1
100 CAPSULE ORAL 2 TIMES DAILY
Qty: 14 CAPSULE | Refills: 0 | Status: SHIPPED | OUTPATIENT
Start: 2020-10-18 | End: 2020-10-25

## 2020-10-18 RX ORDER — ONDANSETRON 4 MG/1
4 TABLET, ORALLY DISINTEGRATING ORAL EVERY 4 HOURS PRN
Qty: 10 TABLET | Refills: 0 | Status: SHIPPED | OUTPATIENT
Start: 2020-10-18 | End: 2020-10-25

## 2020-10-18 NOTE — ED PROVIDER NOTES
Patient Seen in: [facility] AND [facility] Emergency Department      History   Patient presents with:  Nausea    Stated Complaint: Not eating.      HPI    79-year-old female with history of diabetes and asthma presents with complaints of poor appetite, decreased and dry, no rashes.   Musculoskeletal: neck is supple non tender        Extremities are symmetrical, full range of motion  Psychiatric: patient is oriented X 3, there is no agitation    ED Course     Labs Reviewed   URINALYSIS WITH CULTURE REFLEX - Abnormal worsening.               Disposition and Plan     Clinical Impression:  Urinary tract infection without hematuria, site unspecified  (primary encounter diagnosis)    Disposition:  Discharge  10/18/2020 10:47 am    Follow-up:  DO Kassidy Loja

## 2020-10-20 ENCOUNTER — TELEPHONE (OUTPATIENT)
Dept: PHYSICAL THERAPY | Facility: HOSPITAL | Age: 55
End: 2020-10-20

## 2020-10-20 ENCOUNTER — APPOINTMENT (OUTPATIENT)
Dept: PHYSICAL THERAPY | Facility: HOSPITAL | Age: 55
End: 2020-10-20
Attending: PHYSICAL MEDICINE & REHABILITATION
Payer: MEDICAID

## 2020-10-20 NOTE — TELEPHONE ENCOUNTER
Spoke with dtr of patient after 2nd no show today for patient's (mother's) eval. Dtr had spoken with patient and thought that she was to continue but thought that this must be difficult for mother to schedule at this time.  Due to dept policy of 2 consecuti

## 2020-10-22 ENCOUNTER — APPOINTMENT (OUTPATIENT)
Dept: PHYSICAL THERAPY | Facility: HOSPITAL | Age: 55
End: 2020-10-22
Attending: PHYSICAL MEDICINE & REHABILITATION
Payer: MEDICAID

## 2020-10-22 ENCOUNTER — HOSPITAL ENCOUNTER (OUTPATIENT)
Age: 55
Discharge: HOME | End: 2020-10-22
Payer: MEDICAID

## 2020-10-22 VITALS
TEMPERATURE: 98 F | RESPIRATION RATE: 18 BRPM | SYSTOLIC BLOOD PRESSURE: 133 MMHG | HEART RATE: 82 BPM | OXYGEN SATURATION: 99 % | DIASTOLIC BLOOD PRESSURE: 72 MMHG

## 2020-10-22 DIAGNOSIS — Z20.822 ENCOUNTER FOR LABORATORY TESTING FOR COVID-19 VIRUS: Primary | ICD-10-CM

## 2020-10-22 PROCEDURE — 99213 OFFICE O/P EST LOW 20 MIN: CPT

## 2020-10-22 NOTE — ED PROVIDER NOTES
Patient Seen in: [facility]      History   Patient presents with:  Testing    Stated Complaint: covid test    HPI    18-year-old for past medical history of diabetes here for Covid testing.   Patient states she has had mild fatigue and intermi Abdominal:      General: Abdomen is flat. Musculoskeletal: Normal range of motion. Skin:     General: Skin is warm. Neurological:      General: No focal deficit present. Mental Status: She is alert and oriented to person, place, and time.    Ps

## 2020-10-26 ENCOUNTER — APPOINTMENT (OUTPATIENT)
Dept: PHYSICAL THERAPY | Facility: HOSPITAL | Age: 55
End: 2020-10-26
Attending: PHYSICAL MEDICINE & REHABILITATION
Payer: MEDICAID

## 2020-10-28 ENCOUNTER — APPOINTMENT (OUTPATIENT)
Dept: PHYSICAL THERAPY | Facility: HOSPITAL | Age: 55
End: 2020-10-28
Attending: PHYSICAL MEDICINE & REHABILITATION
Payer: MEDICAID

## 2020-11-02 ENCOUNTER — APPOINTMENT (OUTPATIENT)
Dept: PHYSICAL THERAPY | Facility: HOSPITAL | Age: 55
End: 2020-11-02
Attending: PHYSICAL MEDICINE & REHABILITATION
Payer: MEDICAID

## 2020-11-04 ENCOUNTER — APPOINTMENT (OUTPATIENT)
Dept: PHYSICAL THERAPY | Facility: HOSPITAL | Age: 55
End: 2020-11-04
Attending: PHYSICAL MEDICINE & REHABILITATION
Payer: MEDICAID

## 2020-11-04 DIAGNOSIS — E11.65 TYPE 2 DIABETES MELLITUS WITH HYPERGLYCEMIA, WITH LONG-TERM CURRENT USE OF INSULIN (HCC): Primary | ICD-10-CM

## 2020-11-04 DIAGNOSIS — Z79.4 TYPE 2 DIABETES MELLITUS WITH HYPERGLYCEMIA, WITH LONG-TERM CURRENT USE OF INSULIN (HCC): Primary | ICD-10-CM

## 2020-11-04 RX ORDER — LIRAGLUTIDE 6 MG/ML
1.8 INJECTION SUBCUTANEOUS DAILY
Qty: 27 ML | Refills: 0 | Status: SHIPPED | OUTPATIENT
Start: 2020-11-04 | End: 2021-09-10

## 2020-11-06 ENCOUNTER — TELEMEDICINE (OUTPATIENT)
Dept: ENDOCRINOLOGY CLINIC | Facility: CLINIC | Age: 55
End: 2020-11-06
Payer: MEDICAID

## 2020-11-06 DIAGNOSIS — E78.5 DYSLIPIDEMIA: Primary | ICD-10-CM

## 2020-11-06 DIAGNOSIS — E11.65 TYPE 2 DIABETES MELLITUS WITH HYPERGLYCEMIA, WITHOUT LONG-TERM CURRENT USE OF INSULIN (HCC): ICD-10-CM

## 2020-11-06 DIAGNOSIS — E11.65 TYPE 2 DIABETES MELLITUS WITH HYPERGLYCEMIA, UNSPECIFIED WHETHER LONG TERM INSULIN USE (HCC): ICD-10-CM

## 2020-11-06 PROCEDURE — 99213 OFFICE O/P EST LOW 20 MIN: CPT | Performed by: INTERNAL MEDICINE

## 2020-11-06 RX ORDER — GLIMEPIRIDE 4 MG/1
4 TABLET ORAL
COMMUNITY
End: 2020-11-23

## 2020-11-06 NOTE — PROGRESS NOTES
Telehealth outside of [address] Verbal Consent   I conducted a telehealth visit with Jaz Georges today, 11/06/20, which was completed using two-way, real-time interactive audio and video communication.  This has been done in good josh to [employer] hypoglycemia: No  Blood Glucose:  Glucometer:  Checking 2-3 times a day  Fasting 120-140  Pre meals:     Medications for DM  Metformin 1,000 mg po BID  Amaryl 4 mg bid  victoza 0.6 mg daily    Avoid SGLT2 with LE infection/ wound     REVIEW OF SYSTEM Glucose Blood (TRUE METRIX BLOOD GLUCOSE TEST) In Vitro Strip, TEST D OR PRN, Disp: 100 strip, Rfl: 1  •  TRUEplus Lancets 33G Does not apply Misc, TEST BLOOD GLUCOSE BID, Disp: 1 Box, Rfl: 1  •  metFORMIN HCl 1000 MG Oral Tab, TK 1 T PO BID, Disp: 180 tab lesions  Ext: no edema   Hair & Nails:  normal scalp hair     Hematologic:  no excessive bruising  Neuro:  oriented to time, self, and place, sensory grossly intact and motor grossly intact  Psychiatric: Normal mood and behavior   Nutritional:  no abnormal lb (86.2 kg)  09/09/20 : 192 lb (87.1 kg)  08/21/20 : 195 lb (88.5 kg)  07/30/20 : 196 lb (88.9 kg)  05/27/20 : 196 lb (88.9 kg)  05/23/20 : 196 lb (88.9 kg)    5-10% weight loss goal   Limiting sucrose-containing, high fructose-containing, and  high-glyce

## 2020-11-09 ENCOUNTER — APPOINTMENT (OUTPATIENT)
Dept: PHYSICAL THERAPY | Facility: HOSPITAL | Age: 55
End: 2020-11-09
Attending: PHYSICAL MEDICINE & REHABILITATION
Payer: MEDICAID

## 2020-11-11 ENCOUNTER — LAB ENCOUNTER (OUTPATIENT)
Dept: LAB | Facility: HOSPITAL | Age: 55
End: 2020-11-11
Attending: FAMILY MEDICINE
Payer: MEDICAID

## 2020-11-11 DIAGNOSIS — E11.65 TYPE 2 DIABETES MELLITUS WITH HYPERGLYCEMIA, WITHOUT LONG-TERM CURRENT USE OF INSULIN (HCC): ICD-10-CM

## 2020-11-11 DIAGNOSIS — E11.65 TYPE 2 DIABETES MELLITUS WITH HYPERGLYCEMIA, UNSPECIFIED WHETHER LONG TERM INSULIN USE (HCC): ICD-10-CM

## 2020-11-11 DIAGNOSIS — E78.5 DYSLIPIDEMIA: ICD-10-CM

## 2020-11-11 PROCEDURE — 80053 COMPREHEN METABOLIC PANEL: CPT

## 2020-11-11 PROCEDURE — 82570 ASSAY OF URINE CREATININE: CPT

## 2020-11-11 PROCEDURE — 36415 COLL VENOUS BLD VENIPUNCTURE: CPT

## 2020-11-11 PROCEDURE — 82043 UR ALBUMIN QUANTITATIVE: CPT

## 2020-11-11 PROCEDURE — 80061 LIPID PANEL: CPT

## 2020-11-11 PROCEDURE — 83036 HEMOGLOBIN GLYCOSYLATED A1C: CPT

## 2020-11-23 ENCOUNTER — OFFICE VISIT (OUTPATIENT)
Dept: FAMILY MEDICINE CLINIC | Facility: CLINIC | Age: 55
End: 2020-11-23
Payer: MEDICAID

## 2020-11-23 VITALS
HEIGHT: 65 IN | HEART RATE: 76 BPM | WEIGHT: 195 LBS | BODY MASS INDEX: 32.49 KG/M2 | SYSTOLIC BLOOD PRESSURE: 119 MMHG | DIASTOLIC BLOOD PRESSURE: 77 MMHG

## 2020-11-23 DIAGNOSIS — E11.65 TYPE 2 DIABETES MELLITUS WITH HYPERGLYCEMIA, WITHOUT LONG-TERM CURRENT USE OF INSULIN (HCC): ICD-10-CM

## 2020-11-23 PROCEDURE — 3008F BODY MASS INDEX DOCD: CPT | Performed by: FAMILY MEDICINE

## 2020-11-23 PROCEDURE — 3074F SYST BP LT 130 MM HG: CPT | Performed by: FAMILY MEDICINE

## 2020-11-23 PROCEDURE — 99213 OFFICE O/P EST LOW 20 MIN: CPT | Performed by: FAMILY MEDICINE

## 2020-11-23 PROCEDURE — 3078F DIAST BP <80 MM HG: CPT | Performed by: FAMILY MEDICINE

## 2020-11-23 RX ORDER — GLIMEPIRIDE 4 MG/1
4 TABLET ORAL
Qty: 90 TABLET | Refills: 3 | Status: SHIPPED | OUTPATIENT
Start: 2020-11-23 | End: 2021-09-10

## 2020-11-23 RX ORDER — ROSUVASTATIN CALCIUM 20 MG/1
20 TABLET, COATED ORAL DAILY
COMMUNITY
Start: 2020-10-30 | End: 2020-12-29

## 2020-11-23 NOTE — PROGRESS NOTES
Blood pressure 119/77, pulse 76, height 5' 5\" (1.651 m), weight 195 lb (88.5 kg). Following up for type 2 diabetes also hyperlipidemia. Denies hypoglycemia. No chest pain and no dyspnea. Denies polyuria or polydipsia.   No visual complaints last eye

## 2020-11-24 ENCOUNTER — TELEPHONE (OUTPATIENT)
Dept: FAMILY MEDICINE CLINIC | Facility: CLINIC | Age: 55
End: 2020-11-24

## 2020-11-24 NOTE — TELEPHONE ENCOUNTER
Patient's daughter stated mother was supposed to receive cortizone cream prescription at the pharmacy. Please advise.

## 2020-11-24 NOTE — TELEPHONE ENCOUNTER
Please see message below and advice. Patient was seen in office on 11/24/20. Office visit with following instructions: Instructions       Return in about 6 months (around 5/23/2021).   Hydrocortisone 1-2 semanas         Patient requesting prescription

## 2020-12-02 DIAGNOSIS — R31.9 HEMATURIA, UNSPECIFIED TYPE: Primary | ICD-10-CM

## 2020-12-29 RX ORDER — ROSUVASTATIN CALCIUM 20 MG/1
20 TABLET, COATED ORAL DAILY
Qty: 90 TABLET | Refills: 3 | Status: SHIPPED | OUTPATIENT
Start: 2020-12-29 | End: 2021-09-10

## 2021-03-08 ENCOUNTER — OFFICE VISIT (OUTPATIENT)
Dept: ENDOCRINOLOGY CLINIC | Facility: CLINIC | Age: 56
End: 2021-03-08
Payer: MEDICAID

## 2021-03-08 VITALS
SYSTOLIC BLOOD PRESSURE: 124 MMHG | HEART RATE: 76 BPM | DIASTOLIC BLOOD PRESSURE: 79 MMHG | BODY MASS INDEX: 33 KG/M2 | WEIGHT: 200 LBS

## 2021-03-08 DIAGNOSIS — E11.65 TYPE 2 DIABETES MELLITUS WITH HYPERGLYCEMIA, UNSPECIFIED WHETHER LONG TERM INSULIN USE (HCC): ICD-10-CM

## 2021-03-08 DIAGNOSIS — E11.65 TYPE 2 DIABETES MELLITUS WITH HYPERGLYCEMIA, WITHOUT LONG-TERM CURRENT USE OF INSULIN (HCC): Primary | ICD-10-CM

## 2021-03-08 LAB
CARTRIDGE LOT#: ABNORMAL NUMERIC
GLUCOSE BLOOD: 167
HEMOGLOBIN A1C: 6.7 % (ref 4.3–5.6)
TEST STRIP LOT #: NORMAL NUMERIC

## 2021-03-08 PROCEDURE — 3078F DIAST BP <80 MM HG: CPT | Performed by: INTERNAL MEDICINE

## 2021-03-08 PROCEDURE — 83036 HEMOGLOBIN GLYCOSYLATED A1C: CPT | Performed by: INTERNAL MEDICINE

## 2021-03-08 PROCEDURE — 36416 COLLJ CAPILLARY BLOOD SPEC: CPT | Performed by: INTERNAL MEDICINE

## 2021-03-08 PROCEDURE — 3074F SYST BP LT 130 MM HG: CPT | Performed by: INTERNAL MEDICINE

## 2021-03-08 PROCEDURE — 99214 OFFICE O/P EST MOD 30 MIN: CPT | Performed by: INTERNAL MEDICINE

## 2021-03-08 PROCEDURE — 82947 ASSAY GLUCOSE BLOOD QUANT: CPT | Performed by: INTERNAL MEDICINE

## 2021-03-08 PROCEDURE — 3044F HG A1C LEVEL LT 7.0%: CPT | Performed by: INTERNAL MEDICINE

## 2021-03-08 NOTE — PROGRESS NOTES
HISTORY OF PRESENT ILLNESS   Silas Dawson is a 61year old female who presents for diabetes.     Other Significant medical hx includes: DM, Asthma, hyperlipidemia     Diagnosed with diabetes around age 47     Family hx of diabetes denies     Dietary co 2 (two) times daily before meals. , Disp: 90 tablet, Rfl: 3  •  VICTOZA 18 MG/3ML Subcutaneous Solution Pen-injector, Inject 1.8 mg into the skin daily. , Disp: 27 mL, Rfl: 0  •  Albuterol Sulfate  (90 Base) MCG/ACT Inhalation Aero Soln, Inhale 2 puff Concerns:    Social Determinants of Health  Financial Resource Strain:       Difficulty of Paying Living Expenses:   Food Insecurity:       Worried About Running Out of Food in the Last Year:       Ran Out of Food in the Last Year:   Transportation Needs: Type 2 : a1c is 6.7 % ( 3/2021)  Type 2 DM    Reviewed Diabetes mellitus type 2 is an endocrine disorder with insulin resistance and deficiency, resulting in high blood sugars.  Complications can include cardiovascular disease, neuropathy (nerve damage), ne high-glycemic-index foods    Discussed weight, diet, exercise with patient in relation to health conditions.   Used motivational interviewing to illicit change talk and established initial goals with patient, including: walking daily to , reducing sugary be

## 2021-03-17 ENCOUNTER — OFFICE VISIT (OUTPATIENT)
Dept: FAMILY MEDICINE CLINIC | Facility: CLINIC | Age: 56
End: 2021-03-17
Payer: MEDICAID

## 2021-03-17 VITALS
BODY MASS INDEX: 33.15 KG/M2 | SYSTOLIC BLOOD PRESSURE: 127 MMHG | WEIGHT: 199 LBS | HEART RATE: 71 BPM | DIASTOLIC BLOOD PRESSURE: 76 MMHG | HEIGHT: 65 IN

## 2021-03-17 DIAGNOSIS — B35.1 ONYCHOMYCOSIS: ICD-10-CM

## 2021-03-17 DIAGNOSIS — Z12.11 ENCOUNTER FOR SCREENING COLONOSCOPY: Primary | ICD-10-CM

## 2021-03-17 PROCEDURE — 99213 OFFICE O/P EST LOW 20 MIN: CPT | Performed by: FAMILY MEDICINE

## 2021-03-17 PROCEDURE — 3008F BODY MASS INDEX DOCD: CPT | Performed by: FAMILY MEDICINE

## 2021-03-17 PROCEDURE — 3078F DIAST BP <80 MM HG: CPT | Performed by: FAMILY MEDICINE

## 2021-03-17 PROCEDURE — 3074F SYST BP LT 130 MM HG: CPT | Performed by: FAMILY MEDICINE

## 2021-03-17 RX ORDER — TERBINAFINE HYDROCHLORIDE 250 MG/1
250 TABLET ORAL DAILY
Qty: 90 TABLET | Refills: 0 | Status: SHIPPED | OUTPATIENT
Start: 2021-03-17 | End: 2021-06-15

## 2021-03-17 RX ORDER — IBUPROFEN 800 MG/1
TABLET ORAL
COMMUNITY
Start: 2021-03-01

## 2021-03-17 NOTE — PROGRESS NOTES
Blood pressure 127/76, pulse 71, height 5' 5\" (1.651 m), weight 199 lb (90.3 kg). Patient presents today complaining of bilateral toenail fungus. Denies pain. History of diabetes.     Objective bilateral helices with nail dystrophy noted    Assessment

## 2021-03-17 NOTE — PATIENT INSTRUCTIONS
La colonoscopia     A camera attached to a flexible tube with a viewing lens is used to take video pictures.    La colonoscopia es jet prueba que permite mirar el interior del aparato digestivo inferior (el colon y el recto). Algunas veces se puede mirar de inmediato, o en jet visita posterior. Sanjuanita la prueba   La prueba generalmente se realiza en el hospital eber paciente ambulatorio o en jet clínica ambulatoria. Eso significa que puede volver a reddy casa ivis mismo día.  El procedimiento lleva aproximada inferior (el colon y el recto). Algunas veces se puede mirar la última parte del intestino steinberg llamada íleon. Sanjuanita la prueba, se puede extirpar Lavona Bilberry de tejido para enviarla a analizar. Dewart se denomina biopsia.  También se pueden extir volver a reddy casa ivis mismo día. El procedimiento lleva aproximadamente 30 minutos.  Bianka ivis tiempo:  · Orquidea Della un sedante (medicamento relajante) a través de jet sonda intravenosa (i.v.). Quizás esté adormecido o completamente dormido bianka l

## 2021-03-20 ENCOUNTER — IMMUNIZATION (OUTPATIENT)
Dept: LAB | Facility: HOSPITAL | Age: 56
End: 2021-03-20
Attending: HOSPITALIST
Payer: MEDICAID

## 2021-03-20 DIAGNOSIS — Z23 NEED FOR VACCINATION: Primary | ICD-10-CM

## 2021-03-20 PROCEDURE — 0011A SARSCOV2 VAC 100MCG/0.5ML IM: CPT

## 2021-04-13 RX ORDER — ALBUTEROL SULFATE 2.5 MG/3ML
2.5 SOLUTION RESPIRATORY (INHALATION) EVERY 6 HOURS PRN
Qty: 75 VIAL | Refills: 1 | Status: SHIPPED | OUTPATIENT
Start: 2021-04-13

## 2021-04-17 ENCOUNTER — IMMUNIZATION (OUTPATIENT)
Dept: LAB | Facility: HOSPITAL | Age: 56
End: 2021-04-17
Attending: EMERGENCY MEDICINE
Payer: MEDICAID

## 2021-04-17 DIAGNOSIS — Z23 NEED FOR VACCINATION: Primary | ICD-10-CM

## 2021-04-17 PROCEDURE — 0012A SARSCOV2 VAC 100MCG/0.5ML IM: CPT

## 2021-05-06 DIAGNOSIS — E11.65 TYPE 2 DIABETES MELLITUS WITH HYPERGLYCEMIA, UNSPECIFIED WHETHER LONG TERM INSULIN USE (HCC): ICD-10-CM

## 2021-05-06 NOTE — TELEPHONE ENCOUNTER
Per pharmacy pt is requesting refill for the following medication.     Drug: One touch delica plus 39Z lancets  Qty 100  Sig: test twice daily

## 2021-05-07 RX ORDER — GLUCOSAM/CHON-MSM1/C/MANG/BOSW 500-416.6
TABLET ORAL
Qty: 1 BOX | Refills: 1 | Status: SHIPPED | OUTPATIENT
Start: 2021-05-07 | End: 2021-09-10

## 2021-05-08 ENCOUNTER — TELEPHONE (OUTPATIENT)
Dept: FAMILY MEDICINE CLINIC | Facility: CLINIC | Age: 56
End: 2021-05-08

## 2021-05-08 DIAGNOSIS — R92.8 ABNORMAL MAMMOGRAM: Primary | ICD-10-CM

## 2021-05-11 ENCOUNTER — TELEPHONE (OUTPATIENT)
Dept: FAMILY MEDICINE CLINIC | Facility: CLINIC | Age: 56
End: 2021-05-11

## 2021-05-11 DIAGNOSIS — E11.65 TYPE 2 DIABETES MELLITUS WITH HYPERGLYCEMIA, UNSPECIFIED WHETHER LONG TERM INSULIN USE (HCC): ICD-10-CM

## 2021-05-11 NOTE — TELEPHONE ENCOUNTER
Jorjetcamy, [facility] SERVICES msg sent. Pt answered and ended the call.   She is due for a px and PAP, Please offer appt with Breanne Mckinley

## 2021-05-11 NOTE — TELEPHONE ENCOUNTER
Pharmacy refill request for:      •  metFORMIN HCl 1000 MG Oral Tab, TK 1 T PO BID, Disp: 180 tablet, Rfl: 0    Please follow up, thank you.

## 2021-05-11 NOTE — TELEPHONE ENCOUNTER
From mammogram done on 1/10/20:   Impression   CONCLUSION:  Stable probably benign bilateral asymmetries and bilateral breast masses likely representing cysts.  Six-month follow-up bilateral diagnostic mammogram and ultrasound is recommended.       BI-RADS

## 2021-05-11 NOTE — TELEPHONE ENCOUNTER
Per Mary Grace with [employer], patient needs a diagnostic mammogram not a screening mammogram. Please advise.

## 2021-06-01 ENCOUNTER — TELEPHONE (OUTPATIENT)
Dept: FAMILY MEDICINE CLINIC | Facility: CLINIC | Age: 56
End: 2021-06-01

## 2021-06-01 NOTE — TELEPHONE ENCOUNTER
Prior authorization for terbinafine has been approved from 3/1/21 through 6/1/22 pharmacy has been notified.

## 2021-06-01 NOTE — TELEPHONE ENCOUNTER
Fax received. Prior authorization needed:    •  Terbinafine HCl (LAMISIL) 250 MG Oral Tab, Take 1 tablet (250 mg total) by mouth daily. For fungus of the nails for maximum of 3 months.   Avoid excessive alcohol., Disp: 90 tablet, Rfl: 0    KEY: [id number]

## 2021-06-01 NOTE — TELEPHONE ENCOUNTER
Prior authorization for Lamisil completed w/ [employer] on cover my meds Key: [id number], turn around time 1-5 days.

## 2021-06-04 ENCOUNTER — HOSPITAL ENCOUNTER (OUTPATIENT)
Age: 56
Discharge: HOME | End: 2021-06-04
Payer: MEDICAID

## 2021-06-04 VITALS
DIASTOLIC BLOOD PRESSURE: 78 MMHG | TEMPERATURE: 98 F | RESPIRATION RATE: 20 BRPM | HEART RATE: 90 BPM | SYSTOLIC BLOOD PRESSURE: 134 MMHG | OXYGEN SATURATION: 100 %

## 2021-06-04 DIAGNOSIS — M54.31 SCIATICA OF RIGHT SIDE: Primary | ICD-10-CM

## 2021-06-04 PROCEDURE — 99213 OFFICE O/P EST LOW 20 MIN: CPT

## 2021-06-04 RX ORDER — PREDNISONE 20 MG/1
40 TABLET ORAL DAILY
Qty: 10 TABLET | Refills: 0 | Status: SHIPPED | OUTPATIENT
Start: 2021-06-04 | End: 2021-06-09

## 2021-06-04 RX ORDER — METAXALONE 800 MG/1
800 TABLET ORAL 3 TIMES DAILY
Qty: 21 TABLET | Refills: 0 | Status: SHIPPED | OUTPATIENT
Start: 2021-06-04 | End: 2021-06-07

## 2021-06-04 NOTE — ED PROVIDER NOTES
Patient Seen in: [facility]      History   Patient presents with:  Back Pain    Stated Complaint: Right side leg hip pain     HPI/Subjective:   HPI    62 yo female with PMH of asthma, DM here for evaluation of back pain.   History is provided Rate and Rhythm: Normal rate. Pulmonary:      Effort: Pulmonary effort is normal.   Abdominal:      General: Abdomen is flat. Musculoskeletal:         General: Normal range of motion. Cervical back: Normal range of motion.       Lumbar back: Tender

## 2021-06-07 ENCOUNTER — OFFICE VISIT (OUTPATIENT)
Dept: FAMILY MEDICINE CLINIC | Facility: CLINIC | Age: 56
End: 2021-06-07
Payer: MEDICAID

## 2021-06-07 VITALS
HEIGHT: 65 IN | WEIGHT: 190 LBS | SYSTOLIC BLOOD PRESSURE: 124 MMHG | BODY MASS INDEX: 31.65 KG/M2 | HEART RATE: 77 BPM | DIASTOLIC BLOOD PRESSURE: 74 MMHG

## 2021-06-07 DIAGNOSIS — M54.31 SCIATICA OF RIGHT SIDE: Primary | ICD-10-CM

## 2021-06-07 PROCEDURE — 3074F SYST BP LT 130 MM HG: CPT | Performed by: FAMILY MEDICINE

## 2021-06-07 PROCEDURE — 3078F DIAST BP <80 MM HG: CPT | Performed by: FAMILY MEDICINE

## 2021-06-07 PROCEDURE — 99213 OFFICE O/P EST LOW 20 MIN: CPT | Performed by: FAMILY MEDICINE

## 2021-06-07 PROCEDURE — 3008F BODY MASS INDEX DOCD: CPT | Performed by: FAMILY MEDICINE

## 2021-06-07 RX ORDER — IBUPROFEN 600 MG/1
600 TABLET ORAL EVERY 6 HOURS PRN
Qty: 30 TABLET | Refills: 0 | Status: SHIPPED | OUTPATIENT
Start: 2021-06-07 | End: 2021-09-24

## 2021-06-07 RX ORDER — CYCLOBENZAPRINE HCL 10 MG
10 TABLET ORAL NIGHTLY
Qty: 15 TABLET | Refills: 0 | Status: SHIPPED | OUTPATIENT
Start: 2021-06-07 | End: 2021-09-24

## 2021-06-07 NOTE — PROGRESS NOTES
HPI/Subjective:   Patient ID: Pacheco Reese is a 61year old female. Pt presents with right hip/ buttock pain over the last weeks. Pt had no sig trauma or injury. Pt has had no hx of issues in past. Pt was lifting a heavy suitcase.  No numbness or tingli 2 puffs into the lungs every 6 (six) hours as needed for Wheezing. 1 Inhaler 3   • Terbinafine HCl 250 MG Oral Tab Take 250 mg by mouth daily.        • Blood Glucose Monitoring Suppl (ACCU-CHEK ALLI) Does not apply Device 1 Application by Does not apply ro

## 2021-09-10 ENCOUNTER — OFFICE VISIT (OUTPATIENT)
Dept: ENDOCRINOLOGY CLINIC | Facility: CLINIC | Age: 56
End: 2021-09-10
Payer: MEDICAID

## 2021-09-10 ENCOUNTER — TELEPHONE (OUTPATIENT)
Dept: ENDOCRINOLOGY CLINIC | Facility: CLINIC | Age: 56
End: 2021-09-10

## 2021-09-10 VITALS
SYSTOLIC BLOOD PRESSURE: 124 MMHG | DIASTOLIC BLOOD PRESSURE: 76 MMHG | WEIGHT: 193 LBS | TEMPERATURE: 84 F | RESPIRATION RATE: 16 BRPM | HEIGHT: 65 IN | BODY MASS INDEX: 32.15 KG/M2

## 2021-09-10 DIAGNOSIS — E11.65 TYPE 2 DIABETES MELLITUS WITH HYPERGLYCEMIA, UNSPECIFIED WHETHER LONG TERM INSULIN USE (HCC): Primary | ICD-10-CM

## 2021-09-10 DIAGNOSIS — E78.5 DYSLIPIDEMIA: ICD-10-CM

## 2021-09-10 DIAGNOSIS — E11.65 TYPE 2 DIABETES MELLITUS WITH HYPERGLYCEMIA, WITH LONG-TERM CURRENT USE OF INSULIN (HCC): ICD-10-CM

## 2021-09-10 DIAGNOSIS — Z79.4 TYPE 2 DIABETES MELLITUS WITH HYPERGLYCEMIA, WITH LONG-TERM CURRENT USE OF INSULIN (HCC): ICD-10-CM

## 2021-09-10 LAB
CARTRIDGE LOT#: ABNORMAL NUMERIC
GLUCOSE BLOOD: 202
HEMOGLOBIN A1C: 6.5 % (ref 4.3–5.6)
TEST STRIP LOT #: NORMAL NUMERIC

## 2021-09-10 PROCEDURE — 3044F HG A1C LEVEL LT 7.0%: CPT | Performed by: FAMILY MEDICINE

## 2021-09-10 PROCEDURE — 3008F BODY MASS INDEX DOCD: CPT | Performed by: INTERNAL MEDICINE

## 2021-09-10 PROCEDURE — 83036 HEMOGLOBIN GLYCOSYLATED A1C: CPT | Performed by: INTERNAL MEDICINE

## 2021-09-10 PROCEDURE — 3078F DIAST BP <80 MM HG: CPT | Performed by: INTERNAL MEDICINE

## 2021-09-10 PROCEDURE — 36416 COLLJ CAPILLARY BLOOD SPEC: CPT | Performed by: INTERNAL MEDICINE

## 2021-09-10 PROCEDURE — 99213 OFFICE O/P EST LOW 20 MIN: CPT | Performed by: INTERNAL MEDICINE

## 2021-09-10 PROCEDURE — 3074F SYST BP LT 130 MM HG: CPT | Performed by: INTERNAL MEDICINE

## 2021-09-10 PROCEDURE — 82947 ASSAY GLUCOSE BLOOD QUANT: CPT | Performed by: INTERNAL MEDICINE

## 2021-09-10 RX ORDER — GLIMEPIRIDE 4 MG/1
4 TABLET ORAL
Qty: 90 TABLET | Refills: 1 | Status: SHIPPED | OUTPATIENT
Start: 2021-09-10

## 2021-09-10 RX ORDER — PEN NEEDLE, DIABETIC 30 GX3/16"
1 NEEDLE, DISPOSABLE MISCELLANEOUS DAILY
Qty: 100 EACH | Refills: 0 | Status: SHIPPED | OUTPATIENT
Start: 2021-09-10

## 2021-09-10 RX ORDER — LIRAGLUTIDE 6 MG/ML
1.8 INJECTION SUBCUTANEOUS DAILY
Qty: 27 ML | Refills: 1 | Status: SHIPPED | OUTPATIENT
Start: 2021-09-10

## 2021-09-10 RX ORDER — ROSUVASTATIN CALCIUM 20 MG/1
20 TABLET, COATED ORAL DAILY
Qty: 90 TABLET | Refills: 1 | Status: SHIPPED | OUTPATIENT
Start: 2021-09-10

## 2021-09-10 RX ORDER — BLOOD SUGAR DIAGNOSTIC
STRIP MISCELLANEOUS
Qty: 100 STRIP | Refills: 0 | Status: SHIPPED | OUTPATIENT
Start: 2021-09-10

## 2021-09-10 RX ORDER — LANCETS 30 GAUGE
1 EACH MISCELLANEOUS DAILY
Qty: 100 EACH | Refills: 0 | Status: SHIPPED | OUTPATIENT
Start: 2021-09-10 | End: 2021-09-20

## 2021-09-10 NOTE — PROGRESS NOTES
HISTORY OF PRESENT ILLNESS   Serafin Edwards is a 61year old female who presents for diabetes.     Other Significant medical hx includes: DM, Asthma, hyperlipidemia     Diagnosed with diabetes around age 47     Family hx of diabetes denies     Dietary co Oral Tab, TK 1 T PO BID, Disp: 180 tablet, Rfl: 0  •  TRUEplus Lancets 33G Does not apply Misc, TEST BLOOD GLUCOSE BID, Disp: 1 Box, Rfl: 1  •  albuterol sulfate (2.5 MG/3ML) 0.083% Inhalation Nebu Soln, Take 3 mL (2.5 mg total) by nebulization every 6 (si Never Used    Vaping Use      Vaping Use: Never used    Substance and Sexual Activity      Alcohol use: Not Currently      Drug use: Never    Other Topics      Concerns:      Medical History:   Past Medical History:   Diagnosis Date   • Asthma    • Diabete family history of MEN syndrome  Patient counselled regarding side effects including injection site reactions, nausea, vomiting, diarrhea, pancreatitis, gastroparesis and rare side effect evelio Mitchell syndrome.       Reviewed Risk of Hypoglycemia / Liane Ev

## 2021-09-10 NOTE — TELEPHONE ENCOUNTER
Prior authorization needed for Victoza 18MG      •  VICTOZA 18 MG/3ML Subcutaneous Solution Pen-injector, Inject 1.8 mg into the skin daily. , Disp: 27 mL, Rfl: 1

## 2021-09-10 NOTE — TELEPHONE ENCOUNTER
Medication PA Requested: VICTOZA 18 MG/3ML Subcutaneous Solution Pen-injector,  Disp: 27 mL, Rfl: 1                                                            CoverMyMeds Used:  Key:  Quantity: 27 mL Rfls: 1  Day Supply: 180  Sig: Inject 1.8 mg into the sk

## 2021-09-13 NOTE — TELEPHONE ENCOUNTER
Medication PA Requested: VICTOZA 18 MG/3ML Subcutaneous Solution Pen-injector,  Disp: 27 mL, Rfl: 1                                                            CoverMyMeds Used:  Key:   [id number]  Quantity: 27 mL Rfls: 1  Day Supply: 180  Sig: Inject 1.8 mg

## 2021-09-15 NOTE — TELEPHONE ENCOUNTER
No updated information in cover my meds, called [employer], [phone], spoke with Sera Franco. She states was approved 9/14. Does not require PA. She will refax letter to [phone].   Call ref # none    Rosa Elena, [phone], spoke with [facility]

## 2021-09-20 RX ORDER — LANCETS 30 GAUGE
1 EACH MISCELLANEOUS DAILY
Qty: 100 EACH | Refills: 0 | Status: SHIPPED | OUTPATIENT
Start: 2021-09-20

## 2021-09-20 RX ORDER — DULAGLUTIDE 1.5 MG/.5ML
1.5 INJECTION, SOLUTION SUBCUTANEOUS WEEKLY
Qty: 2 ML | Refills: 0 | Status: SHIPPED | OUTPATIENT
Start: 2021-09-20

## 2021-09-20 NOTE — TELEPHONE ENCOUNTER
Please see if patient is okay with this   We could give her trulicity 1.5 mg q month dose with the one month free coupon   After that we could use the bydureon 2 mg q week with the one month free coupon  Will the cost be better in Jan 2021 ?  Please let me

## 2021-09-20 NOTE — TELEPHONE ENCOUNTER
Dr. Rangel Sine patient's insurance to inquire regarding coverage. Per Arielle Cedeno, patient has a very high out of pocket cost of $65,000 and only met $11.  Her policy started 1/22/05.  Victoza is not in the formulary and even if you change it to

## 2021-09-20 NOTE — TELEPHONE ENCOUNTER
No update in cover my meds, called [employer], [phone] spoke with Curtis Ricks. She states this is a \"client managed PA\" and needs to transfer me to [phone],  [facility]. Call ref # [id number] [phone].     Transferred to [facility],

## 2021-09-20 NOTE — TELEPHONE ENCOUNTER
RN Called patient and gave her message below , agreed to try free coupon trulicity and rn will send application for pa program  Via mail  rx sent

## 2021-09-21 ENCOUNTER — OFFICE VISIT (OUTPATIENT)
Dept: INTERNAL MEDICINE CLINIC | Facility: CLINIC | Age: 56
End: 2021-09-21
Payer: MEDICAID

## 2021-09-21 VITALS
WEIGHT: 195 LBS | OXYGEN SATURATION: 99 % | HEIGHT: 65 IN | DIASTOLIC BLOOD PRESSURE: 62 MMHG | BODY MASS INDEX: 32.49 KG/M2 | SYSTOLIC BLOOD PRESSURE: 104 MMHG | HEART RATE: 78 BPM

## 2021-09-21 DIAGNOSIS — R23.8 EASY BRUISING: ICD-10-CM

## 2021-09-21 DIAGNOSIS — Z23 NEED FOR VACCINATION: ICD-10-CM

## 2021-09-21 DIAGNOSIS — M65.321 TRIGGER INDEX FINGER OF RIGHT HAND: Primary | ICD-10-CM

## 2021-09-21 DIAGNOSIS — R20.0 NUMBNESS OF RIGHT LOWER EXTREMITY: ICD-10-CM

## 2021-09-21 DIAGNOSIS — Z12.31 VISIT FOR SCREENING MAMMOGRAM: ICD-10-CM

## 2021-09-21 DIAGNOSIS — Z12.11 SCREENING FOR COLON CANCER: ICD-10-CM

## 2021-09-21 DIAGNOSIS — E11.9 TYPE 2 DIABETES MELLITUS WITHOUT COMPLICATION, WITHOUT LONG-TERM CURRENT USE OF INSULIN (HCC): ICD-10-CM

## 2021-09-21 PROCEDURE — 3008F BODY MASS INDEX DOCD: CPT | Performed by: FAMILY MEDICINE

## 2021-09-21 PROCEDURE — 3078F DIAST BP <80 MM HG: CPT | Performed by: FAMILY MEDICINE

## 2021-09-21 PROCEDURE — 99213 OFFICE O/P EST LOW 20 MIN: CPT | Performed by: FAMILY MEDICINE

## 2021-09-21 PROCEDURE — 90471 IMMUNIZATION ADMIN: CPT | Performed by: FAMILY MEDICINE

## 2021-09-21 PROCEDURE — 3074F SYST BP LT 130 MM HG: CPT | Performed by: FAMILY MEDICINE

## 2021-09-21 PROCEDURE — 90686 IIV4 VACC NO PRSV 0.5 ML IM: CPT | Performed by: FAMILY MEDICINE

## 2021-09-21 NOTE — PROGRESS NOTES
PATIENT IDENTIFICATION  Name: Philip Carcamo  MRN: [id number]    Diagnoses:   Trigger index finger of right hand  (primary encounter diagnosis)  Numbness of right lower extremity  Type 2 diabetes mellitus without complication, without long-term current use tablet 1   • glimepiride 4 MG Oral Tab Take 1 tablet (4 mg total) by mouth 2 (two) times daily before meals.  90 tablet 1   • Glucose Blood (ONETOUCH VERIO) In Vitro Strip Check sugars once a day 100 strip 0   • Insulin Pen Needle (PEN NEEDLES) 32G X 4 MM D right hand  - PLASTIC SURGERY - INTERNAL    2. Numbness of right lower extremity  - PHYSIATRY - INTERNAL    3. Type 2 diabetes mellitus without complication, without long-term current use of insulin (HCC)  - OPHTHALMOLOGY - INTERNAL    4.  Visit for screeni

## 2021-09-24 ENCOUNTER — OFFICE VISIT (OUTPATIENT)
Dept: INTERNAL MEDICINE CLINIC | Facility: CLINIC | Age: 56
End: 2021-09-24
Payer: MEDICAID

## 2021-09-24 VITALS
HEART RATE: 76 BPM | BODY MASS INDEX: 32.49 KG/M2 | SYSTOLIC BLOOD PRESSURE: 118 MMHG | OXYGEN SATURATION: 99 % | DIASTOLIC BLOOD PRESSURE: 72 MMHG | RESPIRATION RATE: 15 BRPM | WEIGHT: 195 LBS | HEIGHT: 65 IN

## 2021-09-24 DIAGNOSIS — E11.9 TYPE 2 DIABETES MELLITUS WITHOUT COMPLICATION, WITHOUT LONG-TERM CURRENT USE OF INSULIN (HCC): ICD-10-CM

## 2021-09-24 DIAGNOSIS — Z00.00 PREVENTATIVE HEALTH CARE: Primary | ICD-10-CM

## 2021-09-24 PROCEDURE — 3061F NEG MICROALBUMINURIA REV: CPT | Performed by: FAMILY MEDICINE

## 2021-09-24 PROCEDURE — 3008F BODY MASS INDEX DOCD: CPT | Performed by: FAMILY MEDICINE

## 2021-09-24 PROCEDURE — 99396 PREV VISIT EST AGE 40-64: CPT | Performed by: FAMILY MEDICINE

## 2021-09-24 PROCEDURE — 3078F DIAST BP <80 MM HG: CPT | Performed by: FAMILY MEDICINE

## 2021-09-24 PROCEDURE — 3074F SYST BP LT 130 MM HG: CPT | Performed by: FAMILY MEDICINE

## 2021-09-24 NOTE — PROGRESS NOTES
PATIENT IDENTIFICATION  Name: Korina Orourke  MRN: [id number]    Diagnoses:   Preventative health care  (primary encounter diagnosis)  Type 2 diabetes mellitus without complication, without long-term current use of insulin ([facility]    SUBJECTIVE:  Korina Orourke week. 2 mL 0   • OneTouch Delica Lancets 37H Does not apply Misc 1 each by Does not apply route daily. 100 each 0   • VICTOZA 18 MG/3ML Subcutaneous Solution Pen-injector Inject 1.8 mg into the skin daily.  (Patient not taking: Reported on 9/21/2021) 27 mL pandemic and risk of COVID-19)   Lymph: no cervical or supraclavicular lymphadenopathy  Breast: normal breast exam bilaterally.    Lungs: chest is clear without rales or wheezing  Heart: S1, S2 normal, no murmur, click, rub or gallop, regular rate and rhyth

## 2021-09-25 LAB
ALBUMIN/GLOBULIN RATIO: 1.3 (CALC) (ref 1–2.5)
ALBUMIN: 4.2 G/DL (ref 3.6–5.1)
ALKALINE PHOSPHATASE: 66 U/L (ref 37–153)
ALT: 18 U/L (ref 6–29)
AST: 14 U/L (ref 10–35)
BILIRUBIN, TOTAL: 0.7 MG/DL (ref 0.2–1.2)
BUN: 17 MG/DL (ref 7–25)
CALCIUM: 9.8 MG/DL (ref 8.6–10.4)
CARBON DIOXIDE: 26 MMOL/L (ref 20–32)
CHLORIDE: 105 MMOL/L (ref 98–110)
CHOL/HDLC RATIO: 2.8 (CALC)
CHOLESTEROL, TOTAL: 173 MG/DL
CREATININE, RANDOM URINE: 136 MG/DL (ref 20–275)
CREATININE: 0.7 MG/DL (ref 0.5–1.05)
EGFR IF AFRICN AM: 111 ML/MIN/1.73M2
EGFR IF NONAFRICN AM: 96 ML/MIN/1.73M2
GLOBULIN: 3.3 G/DL (CALC) (ref 1.9–3.7)
GLUCOSE: 131 MG/DL (ref 65–99)
HDL CHOLESTEROL: 61 MG/DL
HEMATOCRIT: 39.8 % (ref 35–45)
HEMOGLOBIN: 13.1 G/DL (ref 11.7–15.5)
LDL-CHOLESTEROL: 93 MG/DL (CALC)
MCH: 29.9 PG (ref 27–33)
MCHC: 32.9 G/DL (ref 32–36)
MCV: 90.9 FL (ref 80–100)
MICROALBUMIN/CREATININE RATIO, RANDOM URINE: 4 MCG/MG CREAT
MICROALBUMIN: 0.5 MG/DL
MPV: 10.7 FL (ref 7.5–12.5)
NON-HDL CHOLESTEROL: 112 MG/DL (CALC)
PLATELET COUNT: 258 THOUSAND/UL (ref 140–400)
POTASSIUM: 4.3 MMOL/L (ref 3.5–5.3)
PROTEIN, TOTAL: 7.5 G/DL (ref 6.1–8.1)
RDW: 12.8 % (ref 11–15)
RED BLOOD CELL COUNT: 4.38 MILLION/UL (ref 3.8–5.1)
SODIUM: 140 MMOL/L (ref 135–146)
TRIGLYCERIDES: 96 MG/DL
WHITE BLOOD CELL COUNT: 4.9 THOUSAND/UL (ref 3.8–10.8)

## 2021-09-27 ENCOUNTER — TELEPHONE (OUTPATIENT)
Dept: ENDOCRINOLOGY CLINIC | Facility: CLINIC | Age: 56
End: 2021-09-27

## 2021-09-27 NOTE — TELEPHONE ENCOUNTER
Current Outpatient Medications    Sig Dispense Refill   • VICTOZA 18 MG/3ML Subcutaneous Solution Pen-injector Inject 1.8 mg into the skin daily.  (Patient not taking: Reported on 9/21/2021) 27 mL 1

## 2021-09-27 NOTE — TELEPHONE ENCOUNTER
This med not covered by insurance, so   Pt is using trulicity 1.5 mg wk with coupon  See 9/10 /21 enc

## 2021-10-18 ENCOUNTER — OFFICE VISIT (OUTPATIENT)
Dept: SURGERY | Facility: CLINIC | Age: 56
End: 2021-10-18
Payer: MEDICAID

## 2021-10-18 VITALS — SYSTOLIC BLOOD PRESSURE: 131 MMHG | HEART RATE: 71 BPM | DIASTOLIC BLOOD PRESSURE: 81 MMHG | RESPIRATION RATE: 16 BRPM

## 2021-10-18 DIAGNOSIS — M65.321 TRIGGER INDEX FINGER OF RIGHT HAND: Primary | ICD-10-CM

## 2021-10-18 PROCEDURE — 3079F DIAST BP 80-89 MM HG: CPT | Performed by: PLASTIC SURGERY

## 2021-10-18 PROCEDURE — 3075F SYST BP GE 130 - 139MM HG: CPT | Performed by: PLASTIC SURGERY

## 2021-10-18 PROCEDURE — 20550 NJX 1 TENDON SHEATH/LIGAMENT: CPT | Performed by: PLASTIC SURGERY

## 2021-10-18 PROCEDURE — 99243 OFF/OP CNSLTJ NEW/EST LOW 30: CPT | Performed by: PLASTIC SURGERY

## 2021-10-18 RX ORDER — BETAMETHASONE SODIUM PHOSPHATE AND BETAMETHASONE ACETATE 3; 3 MG/ML; MG/ML
6 INJECTION, SUSPENSION INTRA-ARTICULAR; INTRALESIONAL; INTRAMUSCULAR; SOFT TISSUE ONCE
Status: COMPLETED | OUTPATIENT
Start: 2021-10-18 | End: 2021-10-18

## 2021-10-18 RX ADMIN — BETAMETHASONE SODIUM PHOSPHATE AND BETAMETHASONE ACETATE 6 MG: 3; 3 INJECTION, SUSPENSION INTRA-ARTICULAR; INTRALESIONAL; INTRAMUSCULAR; SOFT TISSUE at 12:23:00

## 2021-10-18 NOTE — H&P
Richy Grace is a 61year old female that presents with Patient presents with:  Pain: Right index finger trigger   .     REFERRED BY:  Queenie Jama    Pacemaker: No  Latex Allergy: no  Coumadin: No  Plavix: No  Other anticoagulants: No  Cardiac stents: No • metFORMIN HCl 1000 MG Oral Tab TK 1 T PO  tablet 1   • glimepiride 4 MG Oral Tab Take 1 tablet (4 mg total) by mouth 2 (two) times daily before meals.  90 tablet 1   • Glucose Blood (ONETOUCH VERIO) In Vitro Strip Check sugars once a day 100 stri locking      ASSESSMENT/PLAN:       TRIGGER DIGIT RIF          We discussed what a TRIGGER DIGIT is, including treatment options. Questions were answered and the patient wishes to proceed with treatment. I would recommend steroid injection.   This has

## 2021-10-18 NOTE — PROGRESS NOTES
After evaluation by Dr. Pauline Linares, orders verified for 2 cc celestone injection(s) to right index finger. 1cc/6mg Betamethasone and 1cc/10mg 1% Lidocaine drawn up into one syringe for injection(s).   Consent obtained and witnessed, and time-out performed

## 2021-12-29 RX ORDER — BLOOD SUGAR DIAGNOSTIC
STRIP MISCELLANEOUS
Qty: 100 STRIP | Refills: 0 | OUTPATIENT
Start: 2021-12-29

## 2022-01-11 ENCOUNTER — OFFICE VISIT (OUTPATIENT)
Dept: INTERNAL MEDICINE CLINIC | Facility: CLINIC | Age: 57
End: 2022-01-11
Payer: MEDICAID

## 2022-01-11 VITALS
HEIGHT: 65 IN | BODY MASS INDEX: 33.32 KG/M2 | SYSTOLIC BLOOD PRESSURE: 130 MMHG | DIASTOLIC BLOOD PRESSURE: 78 MMHG | HEART RATE: 73 BPM | OXYGEN SATURATION: 98 % | RESPIRATION RATE: 17 BRPM | WEIGHT: 200 LBS

## 2022-01-11 DIAGNOSIS — E11.9 TYPE 2 DIABETES MELLITUS WITHOUT COMPLICATION, WITHOUT LONG-TERM CURRENT USE OF INSULIN (HCC): Primary | ICD-10-CM

## 2022-01-11 DIAGNOSIS — R20.2 NUMBNESS AND TINGLING OF LOWER EXTREMITY: ICD-10-CM

## 2022-01-11 DIAGNOSIS — R20.0 NUMBNESS AND TINGLING OF LOWER EXTREMITY: ICD-10-CM

## 2022-01-11 PROCEDURE — 3078F DIAST BP <80 MM HG: CPT | Performed by: FAMILY MEDICINE

## 2022-01-11 PROCEDURE — 3008F BODY MASS INDEX DOCD: CPT | Performed by: FAMILY MEDICINE

## 2022-01-11 PROCEDURE — 3075F SYST BP GE 130 - 139MM HG: CPT | Performed by: FAMILY MEDICINE

## 2022-01-11 PROCEDURE — 99214 OFFICE O/P EST MOD 30 MIN: CPT | Performed by: FAMILY MEDICINE

## 2022-01-11 PROCEDURE — 3044F HG A1C LEVEL LT 7.0%: CPT | Performed by: FAMILY MEDICINE

## 2022-01-11 NOTE — PROGRESS NOTES
PATIENT IDENTIFICATION  Name: Carine Talley  MRN: [id number]    Diagnoses:   Type 2 diabetes mellitus without complication, without long-term current use of insulin ([facility]  (primary encounter diagnosis)  Numbness and tingling of lower extremity    SUBJECTIV daily. 27 mL 1   • rosuvastatin 20 MG Oral Tab Take 1 tablet (20 mg total) by mouth daily.  90 tablet 1   • metFORMIN HCl 1000 MG Oral Tab TK 1 T PO  tablet 1   • glimepiride 4 MG Oral Tab Take 1 tablet (4 mg total) by mouth 2 (two) times daily befor understanding. Svetlana Duncan MD    Time spent: 30 minutes, 5 minutes of previsit prep, 20 minutes of collecting hx, 5 minutes of note writing.

## 2022-01-12 LAB — HEMOGLOBIN A1C: 6.9 % OF TOTAL HGB

## 2022-02-14 DIAGNOSIS — E11.65 TYPE 2 DIABETES MELLITUS WITH HYPERGLYCEMIA, UNSPECIFIED WHETHER LONG TERM INSULIN USE (HCC): ICD-10-CM

## 2022-02-15 RX ORDER — ROSUVASTATIN CALCIUM 20 MG/1
20 TABLET, COATED ORAL DAILY
Qty: 90 TABLET | Refills: 0 | Status: SHIPPED | OUTPATIENT
Start: 2022-02-15

## 2022-02-15 RX ORDER — LISINOPRIL 5 MG/1
5 TABLET ORAL DAILY
Qty: 90 TABLET | Refills: 3 | OUTPATIENT
Start: 2022-02-15

## 2022-02-15 RX ORDER — ALBUTEROL SULFATE 2.5 MG/3ML
2.5 SOLUTION RESPIRATORY (INHALATION) EVERY 6 HOURS PRN
Qty: 75 EACH | Refills: 0 | Status: SHIPPED | OUTPATIENT
Start: 2022-02-15

## 2022-02-15 NOTE — TELEPHONE ENCOUNTER
Refill passed per [place] [employer] [facility] [facility] protocol. Requested Prescriptions   Pending Prescriptions Disp Refills    albuterol (2.5 MG/3ML) 0.083% Inhalation Nebu Soln  0     Sig: Take 3 mL (2.5 mg total) by nebulization every 6 (six) hours as needed for Wheezing.         Asthma & COPD Medication Protocol Passed - 2/14/2022  5:38 PM        Passed - Appointment in past 6 or next 3 months              Recent Outpatient Visits              1 month ago Type 2 diabetes mellitus without complication, without long-term current use of insulin [facility])    [address] [place] [address], Werner Luna MD    Office Visit    4 months ago Trigger index finger of right hand    [address] [facility], [address] [facility], Judge Mustapha MD    Office Visit    4 months ago [facility] health care    [address] [place] [address] Hannah Yi MD    Office Visit    4 months ago Trigger index finger of right hand    [address] [place] [address], Werner Luna MD    Office Visit    5 months ago Type 2 diabetes mellitus with hyperglycemia, unspecified whether long term insulin use [facility])    [facility] Endocrinology Janeth Funes MD    Office Visit            Future Appointments         Provider Department Appt Notes    In 6 days Benja Martel  [facility]-Physiatry Lower extrimity numbness & Tingling    In 3 weeks Janeth Funes MD [facility] Endocrinology 6m f/u    In 1 month Hannah Yi MD Merline Romp, [facility] f.u

## 2022-02-21 ENCOUNTER — OFFICE VISIT (OUTPATIENT)
Dept: PHYSICAL MEDICINE AND REHAB | Facility: CLINIC | Age: 57
End: 2022-02-21
Payer: MEDICAID

## 2022-02-21 VITALS
WEIGHT: 199 LBS | DIASTOLIC BLOOD PRESSURE: 76 MMHG | HEART RATE: 78 BPM | HEIGHT: 65 IN | RESPIRATION RATE: 14 BRPM | OXYGEN SATURATION: 99 % | BODY MASS INDEX: 33.15 KG/M2 | SYSTOLIC BLOOD PRESSURE: 120 MMHG

## 2022-02-21 DIAGNOSIS — Z86.72 HISTORY OF THROMBOPHLEBITIS: ICD-10-CM

## 2022-02-21 DIAGNOSIS — R20.0 NUMBNESS AND TINGLING OF RIGHT LEG: Primary | ICD-10-CM

## 2022-02-21 DIAGNOSIS — L97.919 DIABETIC ULCER OF RIGHT LOWER LEG (HCC): ICD-10-CM

## 2022-02-21 DIAGNOSIS — S91.001A UNSPECIFIED OPEN WOUND, RIGHT ANKLE, INITIAL ENCOUNTER: ICD-10-CM

## 2022-02-21 DIAGNOSIS — R20.2 NUMBNESS AND TINGLING OF RIGHT LEG: Primary | ICD-10-CM

## 2022-02-21 DIAGNOSIS — E11.622 DIABETIC ULCER OF RIGHT LOWER LEG (HCC): ICD-10-CM

## 2022-02-21 PROCEDURE — 3078F DIAST BP <80 MM HG: CPT | Performed by: PHYSICAL MEDICINE & REHABILITATION

## 2022-02-21 PROCEDURE — 3008F BODY MASS INDEX DOCD: CPT | Performed by: PHYSICAL MEDICINE & REHABILITATION

## 2022-02-21 PROCEDURE — 99214 OFFICE O/P EST MOD 30 MIN: CPT | Performed by: PHYSICAL MEDICINE & REHABILITATION

## 2022-02-21 PROCEDURE — 3074F SYST BP LT 130 MM HG: CPT | Performed by: PHYSICAL MEDICINE & REHABILITATION

## 2022-02-21 RX ORDER — GABAPENTIN 300 MG/1
CAPSULE ORAL
Qty: 90 CAPSULE | Refills: 0 | Status: SHIPPED | OUTPATIENT
Start: 2022-02-21 | End: 2022-03-21

## 2022-02-21 NOTE — PATIENT INSTRUCTIONS
-Gabapentin 300mg three times daily  -Continue topical treatments as tolerated  -Follow up in 4 weeks

## 2022-02-25 ENCOUNTER — TELEPHONE (OUTPATIENT)
Dept: ENDOCRINOLOGY CLINIC | Facility: CLINIC | Age: 57
End: 2022-02-25

## 2022-02-25 RX ORDER — BLOOD-GLUCOSE METER
1 EACH MISCELLANEOUS 2 TIMES DAILY
Qty: 1 KIT | Refills: 0 | Status: SHIPPED | OUTPATIENT
Start: 2022-02-25

## 2022-02-25 NOTE — TELEPHONE ENCOUNTER
Dae Patterson states that the pt current glucose machine does not work anymore, so she is requesting to get a new order sent to [employer].

## 2022-03-11 ENCOUNTER — OFFICE VISIT (OUTPATIENT)
Dept: ENDOCRINOLOGY CLINIC | Facility: CLINIC | Age: 57
End: 2022-03-11
Payer: MEDICAID

## 2022-03-11 VITALS — SYSTOLIC BLOOD PRESSURE: 136 MMHG | HEART RATE: 71 BPM | DIASTOLIC BLOOD PRESSURE: 81 MMHG

## 2022-03-11 DIAGNOSIS — E11.65 TYPE 2 DIABETES MELLITUS WITH HYPERGLYCEMIA, WITH LONG-TERM CURRENT USE OF INSULIN (HCC): ICD-10-CM

## 2022-03-11 DIAGNOSIS — Z79.4 TYPE 2 DIABETES MELLITUS WITH HYPERGLYCEMIA, WITH LONG-TERM CURRENT USE OF INSULIN (HCC): ICD-10-CM

## 2022-03-11 DIAGNOSIS — E11.65 TYPE 2 DIABETES MELLITUS WITH HYPERGLYCEMIA, UNSPECIFIED WHETHER LONG TERM INSULIN USE (HCC): Primary | ICD-10-CM

## 2022-03-11 LAB
GLUCOSE BLOOD: 203
TEST STRIP LOT #: NORMAL NUMERIC

## 2022-03-11 PROCEDURE — 82947 ASSAY GLUCOSE BLOOD QUANT: CPT | Performed by: INTERNAL MEDICINE

## 2022-03-11 PROCEDURE — 36416 COLLJ CAPILLARY BLOOD SPEC: CPT | Performed by: INTERNAL MEDICINE

## 2022-03-11 PROCEDURE — 3075F SYST BP GE 130 - 139MM HG: CPT | Performed by: INTERNAL MEDICINE

## 2022-03-11 PROCEDURE — 99213 OFFICE O/P EST LOW 20 MIN: CPT | Performed by: INTERNAL MEDICINE

## 2022-03-11 PROCEDURE — 3079F DIAST BP 80-89 MM HG: CPT | Performed by: INTERNAL MEDICINE

## 2022-03-11 RX ORDER — LIRAGLUTIDE 6 MG/ML
1.8 INJECTION SUBCUTANEOUS DAILY
Qty: 27 ML | Refills: 1 | Status: SHIPPED | OUTPATIENT
Start: 2022-03-11

## 2022-03-11 RX ORDER — ALBUTEROL SULFATE 90 UG/1
2 AEROSOL, METERED RESPIRATORY (INHALATION) EVERY 6 HOURS PRN
Qty: 8.5 G | Refills: 0 | Status: SHIPPED | OUTPATIENT
Start: 2022-03-11 | End: 2022-08-26

## 2022-03-11 RX ORDER — LANCETS 30 GAUGE
1 EACH MISCELLANEOUS DAILY
Qty: 100 EACH | Refills: 0 | Status: SHIPPED | OUTPATIENT
Start: 2022-03-11

## 2022-03-11 RX ORDER — BLOOD SUGAR DIAGNOSTIC
STRIP MISCELLANEOUS
Qty: 100 STRIP | Refills: 0 | Status: SHIPPED | OUTPATIENT
Start: 2022-03-11

## 2022-03-11 NOTE — TELEPHONE ENCOUNTER
Refill passed per [facility] protocol.     Requested Prescriptions   Pending Prescriptions Disp Refills    ALBUTEROL 108 (90 Base) MCG/ACT Inhalation Aero Soln [Pharmacy Med Name: ALBUTEROL HFA INH (200 PUFFS)8.5GM] 8.5 g 0     Sig: INHALE 2 PUFFS INTO THE LUNGS EVERY 6 HOURS AS NEEDED FOR WHEEZING        Asthma & COPD Medication Protocol Passed - 3/11/2022 12:22 PM        Passed - Appointment in past 6 or next 3 months            Recent Outpatient Visits              Today Type 2 diabetes mellitus with hyperglycemia, unspecified whether long term insulin use [facility])    [facility] Endocrinology Jalil Prieto MD    Office Visit    2 weeks ago Numbness and tingling of right leg    [address] [facility] [place], [place]    Office Visit    1 month ago Type 2 diabetes mellitus without complication, without long-term current use of insulin [facility])    [facility] [place] Lovely Ghotra MD    Office Visit    4 months ago Trigger index finger of right hand    [address] [facility] [place], [place], Sheng Oliver MD    Office Visit    5 months ago Preventative health care    [address] [place] [address], Ray Fuller MD    Office Visit          Future Appointments         Provider Department Appt Notes    In 1 week [place]  [facility] f/u    In 1 month Lovely Ghotra MD [address] [facility] f.u     In 5 months Jalil Prieto, [facility] Endocrinology 5m f/u

## 2022-03-21 ENCOUNTER — TELEPHONE (OUTPATIENT)
Dept: NEUROLOGY | Facility: CLINIC | Age: 57
End: 2022-03-21

## 2022-03-21 ENCOUNTER — OFFICE VISIT (OUTPATIENT)
Dept: PHYSICAL MEDICINE AND REHAB | Facility: CLINIC | Age: 57
End: 2022-03-21
Payer: MEDICAID

## 2022-03-21 VITALS
WEIGHT: 196 LBS | HEIGHT: 65 IN | HEART RATE: 82 BPM | BODY MASS INDEX: 32.65 KG/M2 | SYSTOLIC BLOOD PRESSURE: 132 MMHG | OXYGEN SATURATION: 98 % | DIASTOLIC BLOOD PRESSURE: 90 MMHG

## 2022-03-21 DIAGNOSIS — Z86.72 HISTORY OF THROMBOPHLEBITIS: ICD-10-CM

## 2022-03-21 DIAGNOSIS — E11.622 DIABETIC ULCER OF RIGHT LOWER LEG (HCC): ICD-10-CM

## 2022-03-21 DIAGNOSIS — L97.919 DIABETIC ULCER OF RIGHT LOWER LEG (HCC): ICD-10-CM

## 2022-03-21 DIAGNOSIS — M54.16 RIGHT LUMBAR RADICULOPATHY: Primary | ICD-10-CM

## 2022-03-21 DIAGNOSIS — S91.001A UNSPECIFIED OPEN WOUND, RIGHT ANKLE, INITIAL ENCOUNTER: ICD-10-CM

## 2022-03-21 PROCEDURE — 3080F DIAST BP >= 90 MM HG: CPT | Performed by: PHYSICAL MEDICINE & REHABILITATION

## 2022-03-21 PROCEDURE — 99214 OFFICE O/P EST MOD 30 MIN: CPT | Performed by: PHYSICAL MEDICINE & REHABILITATION

## 2022-03-21 PROCEDURE — 3075F SYST BP GE 130 - 139MM HG: CPT | Performed by: PHYSICAL MEDICINE & REHABILITATION

## 2022-03-21 PROCEDURE — 3008F BODY MASS INDEX DOCD: CPT | Performed by: PHYSICAL MEDICINE & REHABILITATION

## 2022-03-21 RX ORDER — GABAPENTIN 600 MG/1
600 TABLET ORAL 3 TIMES DAILY
Qty: 90 TABLET | Refills: 0 | Status: SHIPPED | OUTPATIENT
Start: 2022-03-21

## 2022-03-21 NOTE — TELEPHONE ENCOUNTER
[employer] for authorization of approval for MRI L-spine wo cpt code 71937. Case Number: [id number]. Will fax clinical note when available.

## 2022-03-23 PROBLEM — M54.16 RIGHT LUMBAR RADICULOPATHY: Status: ACTIVE | Noted: 2022-03-23

## 2022-03-24 ENCOUNTER — TELEPHONE (OUTPATIENT)
Dept: PHYSICAL MEDICINE AND REHAB | Facility: CLINIC | Age: 57
End: 2022-03-24

## 2022-03-24 NOTE — TELEPHONE ENCOUNTER
Faxed clinical note  for authorization of approval for MRI L-spine wo cpt code 82850 Case Number: [id number] pending approval.

## 2022-03-29 NOTE — TELEPHONE ENCOUNTER
[employer] to check on status of authorization of MRI L-spine. Authorization Number: [id number] valid 3/24/2022-9/20/2022.  L/m advising of approval.

## 2022-04-05 ENCOUNTER — TELEPHONE (OUTPATIENT)
Dept: INTERNAL MEDICINE CLINIC | Facility: CLINIC | Age: 57
End: 2022-04-05

## 2022-04-18 ENCOUNTER — OFFICE VISIT (OUTPATIENT)
Dept: INTERNAL MEDICINE CLINIC | Facility: CLINIC | Age: 57
End: 2022-04-18
Payer: MEDICAID

## 2022-04-18 VITALS
BODY MASS INDEX: 32.99 KG/M2 | DIASTOLIC BLOOD PRESSURE: 78 MMHG | HEART RATE: 87 BPM | OXYGEN SATURATION: 92 % | WEIGHT: 198 LBS | HEIGHT: 65 IN | SYSTOLIC BLOOD PRESSURE: 132 MMHG

## 2022-04-18 DIAGNOSIS — E11.9 TYPE 2 DIABETES MELLITUS WITHOUT COMPLICATION, WITHOUT LONG-TERM CURRENT USE OF INSULIN (HCC): Primary | ICD-10-CM

## 2022-04-18 DIAGNOSIS — K21.9 GASTROESOPHAGEAL REFLUX DISEASE, UNSPECIFIED WHETHER ESOPHAGITIS PRESENT: ICD-10-CM

## 2022-04-18 LAB
CARTRIDGE LOT#: 902 NUMERIC
HEMOGLOBIN A1C: 7.1 % (ref 4.3–5.6)

## 2022-04-18 PROCEDURE — 99213 OFFICE O/P EST LOW 20 MIN: CPT | Performed by: FAMILY MEDICINE

## 2022-04-18 PROCEDURE — 3008F BODY MASS INDEX DOCD: CPT | Performed by: FAMILY MEDICINE

## 2022-04-18 PROCEDURE — 3078F DIAST BP <80 MM HG: CPT | Performed by: FAMILY MEDICINE

## 2022-04-18 PROCEDURE — 3075F SYST BP GE 130 - 139MM HG: CPT | Performed by: FAMILY MEDICINE

## 2022-04-18 PROCEDURE — 83036 HEMOGLOBIN GLYCOSYLATED A1C: CPT | Performed by: FAMILY MEDICINE

## 2022-04-18 PROCEDURE — 3051F HG A1C>EQUAL 7.0%<8.0%: CPT | Performed by: FAMILY MEDICINE

## 2022-04-18 RX ORDER — LISINOPRIL 5 MG/1
5 TABLET ORAL DAILY
Qty: 90 TABLET | Refills: 3 | Status: SHIPPED | OUTPATIENT
Start: 2022-04-18

## 2022-04-18 RX ORDER — OMEPRAZOLE 20 MG/1
20 CAPSULE, DELAYED RELEASE ORAL
Qty: 30 CAPSULE | Refills: 2 | Status: SHIPPED | OUTPATIENT
Start: 2022-04-18

## 2022-04-18 RX ORDER — ROSUVASTATIN CALCIUM 20 MG/1
20 TABLET, COATED ORAL DAILY
Qty: 90 TABLET | Refills: 2 | Status: SHIPPED | OUTPATIENT
Start: 2022-04-18

## 2022-05-23 ENCOUNTER — OFFICE VISIT (OUTPATIENT)
Dept: INTERNAL MEDICINE CLINIC | Facility: CLINIC | Age: 57
End: 2022-05-23
Payer: MEDICAID

## 2022-05-23 VITALS
BODY MASS INDEX: 32.82 KG/M2 | DIASTOLIC BLOOD PRESSURE: 70 MMHG | HEIGHT: 65 IN | RESPIRATION RATE: 17 BRPM | WEIGHT: 197 LBS | SYSTOLIC BLOOD PRESSURE: 126 MMHG | HEART RATE: 85 BPM

## 2022-05-23 DIAGNOSIS — M70.41: Primary | ICD-10-CM

## 2022-05-23 PROCEDURE — 3008F BODY MASS INDEX DOCD: CPT | Performed by: FAMILY MEDICINE

## 2022-05-23 PROCEDURE — 3078F DIAST BP <80 MM HG: CPT | Performed by: FAMILY MEDICINE

## 2022-05-23 PROCEDURE — 3074F SYST BP LT 130 MM HG: CPT | Performed by: FAMILY MEDICINE

## 2022-05-23 PROCEDURE — 99213 OFFICE O/P EST LOW 20 MIN: CPT | Performed by: FAMILY MEDICINE

## 2022-05-23 RX ORDER — IBUPROFEN 800 MG/1
800 TABLET ORAL EVERY 8 HOURS PRN
Qty: 40 TABLET | Refills: 1 | Status: SHIPPED | OUTPATIENT
Start: 2022-05-23

## 2022-05-29 ENCOUNTER — HOSPITAL ENCOUNTER (EMERGENCY)
Facility: HOSPITAL | Age: 57
Discharge: HOME | End: 2022-05-29
Attending: EMERGENCY MEDICINE
Payer: MEDICAID

## 2022-05-29 VITALS
TEMPERATURE: 98 F | HEART RATE: 84 BPM | RESPIRATION RATE: 16 BRPM | SYSTOLIC BLOOD PRESSURE: 145 MMHG | OXYGEN SATURATION: 96 % | DIASTOLIC BLOOD PRESSURE: 90 MMHG

## 2022-05-29 DIAGNOSIS — S46.911A SHOULDER STRAIN, RIGHT, INITIAL ENCOUNTER: Primary | ICD-10-CM

## 2022-05-29 PROCEDURE — 96372 THER/PROPH/DIAG INJ SC/IM: CPT

## 2022-05-29 PROCEDURE — 99283 EMERGENCY DEPT VISIT LOW MDM: CPT

## 2022-05-29 RX ORDER — KETOROLAC TROMETHAMINE 30 MG/ML
30 INJECTION, SOLUTION INTRAMUSCULAR; INTRAVENOUS ONCE
Status: COMPLETED | OUTPATIENT
Start: 2022-05-29 | End: 2022-05-29

## 2022-05-29 RX ORDER — CYCLOBENZAPRINE HCL 10 MG
10 TABLET ORAL 3 TIMES DAILY PRN
Qty: 14 TABLET | Refills: 0 | Status: SHIPPED | OUTPATIENT
Start: 2022-05-29

## 2022-05-29 RX ORDER — NAPROXEN 500 MG/1
500 TABLET ORAL 2 TIMES DAILY PRN
Qty: 14 TABLET | Refills: 0 | Status: SHIPPED | OUTPATIENT
Start: 2022-05-29 | End: 2022-06-05

## 2022-05-30 NOTE — ED INITIAL ASSESSMENT (HPI)
Pt c/o R shoulderblade pain since yesterday. Notes she was carrying a case of juices shortly before pain started.  Denies any further complaints including abd pain, n/v.

## 2022-06-17 DIAGNOSIS — E11.65 TYPE 2 DIABETES MELLITUS WITH HYPERGLYCEMIA, UNSPECIFIED WHETHER LONG TERM INSULIN USE (HCC): ICD-10-CM

## 2022-06-17 RX ORDER — PEN NEEDLE, DIABETIC 30 GX3/16"
1 NEEDLE, DISPOSABLE MISCELLANEOUS DAILY
Qty: 100 EACH | Refills: 0 | Status: SHIPPED | OUTPATIENT
Start: 2022-06-17

## 2022-06-24 RX ORDER — LANCETS 30 GAUGE
EACH MISCELLANEOUS
Qty: 100 EACH | Refills: 0 | Status: SHIPPED | OUTPATIENT
Start: 2022-06-24

## 2022-07-02 ENCOUNTER — HOSPITAL ENCOUNTER (OUTPATIENT)
Dept: MRI IMAGING | Age: 57
Discharge: HOME | End: 2022-07-02
Attending: PHYSICAL MEDICINE & REHABILITATION
Payer: MEDICAID

## 2022-07-02 DIAGNOSIS — M54.16 RIGHT LUMBAR RADICULOPATHY: ICD-10-CM

## 2022-07-03 RX ORDER — BLOOD SUGAR DIAGNOSTIC
STRIP MISCELLANEOUS
Qty: 100 STRIP | Refills: 0 | Status: SHIPPED | OUTPATIENT
Start: 2022-07-03

## 2022-08-03 RX ORDER — ALBUTEROL SULFATE 90 UG/1
AEROSOL, METERED RESPIRATORY (INHALATION)
Qty: 8.5 G | Refills: 0 | OUTPATIENT
Start: 2022-08-03

## 2022-08-12 ENCOUNTER — OFFICE VISIT (OUTPATIENT)
Dept: ENDOCRINOLOGY CLINIC | Facility: CLINIC | Age: 57
End: 2022-08-12
Payer: MEDICAID

## 2022-08-12 VITALS — HEIGHT: 65 IN | RESPIRATION RATE: 16 BRPM | BODY MASS INDEX: 32.82 KG/M2 | WEIGHT: 197 LBS

## 2022-08-12 DIAGNOSIS — E11.65 TYPE 2 DIABETES MELLITUS WITH HYPERGLYCEMIA, UNSPECIFIED WHETHER LONG TERM INSULIN USE (HCC): Primary | ICD-10-CM

## 2022-08-12 DIAGNOSIS — E78.5 DYSLIPIDEMIA: ICD-10-CM

## 2022-08-12 LAB
CARTRIDGE LOT#: ABNORMAL NUMERIC
GLUCOSE BLOOD: 193
HEMOGLOBIN A1C: 7.4 % (ref 4.3–5.6)
TEST STRIP LOT #: NORMAL NUMERIC

## 2022-08-12 PROCEDURE — 3051F HG A1C>EQUAL 7.0%<8.0%: CPT | Performed by: INTERNAL MEDICINE

## 2022-08-12 PROCEDURE — 83036 HEMOGLOBIN GLYCOSYLATED A1C: CPT | Performed by: INTERNAL MEDICINE

## 2022-08-12 PROCEDURE — 3008F BODY MASS INDEX DOCD: CPT | Performed by: INTERNAL MEDICINE

## 2022-08-12 PROCEDURE — 82947 ASSAY GLUCOSE BLOOD QUANT: CPT | Performed by: INTERNAL MEDICINE

## 2022-08-12 PROCEDURE — 99214 OFFICE O/P EST MOD 30 MIN: CPT | Performed by: INTERNAL MEDICINE

## 2022-08-12 RX ORDER — BLOOD SUGAR DIAGNOSTIC
STRIP MISCELLANEOUS
Qty: 200 EACH | Refills: 0 | Status: SHIPPED | OUTPATIENT
Start: 2022-08-12

## 2022-08-12 RX ORDER — GLIMEPIRIDE 2 MG/1
2 TABLET ORAL
Qty: 90 TABLET | Refills: 1 | Status: SHIPPED | OUTPATIENT
Start: 2022-08-12

## 2022-08-12 RX ORDER — LIRAGLUTIDE 6 MG/ML
1.8 INJECTION SUBCUTANEOUS DAILY
Qty: 27 ML | Refills: 1 | Status: SHIPPED | OUTPATIENT
Start: 2022-08-12

## 2022-08-26 ENCOUNTER — LAB ENCOUNTER (OUTPATIENT)
Dept: LAB | Age: 57
End: 2022-08-26
Attending: FAMILY MEDICINE
Payer: MEDICAID

## 2022-08-26 ENCOUNTER — OFFICE VISIT (OUTPATIENT)
Dept: FAMILY MEDICINE CLINIC | Facility: CLINIC | Age: 57
End: 2022-08-26
Payer: MEDICAID

## 2022-08-26 VITALS
DIASTOLIC BLOOD PRESSURE: 74 MMHG | WEIGHT: 196 LBS | HEART RATE: 65 BPM | HEIGHT: 65 IN | SYSTOLIC BLOOD PRESSURE: 122 MMHG | BODY MASS INDEX: 32.65 KG/M2

## 2022-08-26 DIAGNOSIS — M25.561 RIGHT KNEE PAIN, UNSPECIFIED CHRONICITY: ICD-10-CM

## 2022-08-26 DIAGNOSIS — I10 ESSENTIAL HYPERTENSION: ICD-10-CM

## 2022-08-26 DIAGNOSIS — E11.69 DIABETES MELLITUS TYPE 2 IN OBESE (HCC): ICD-10-CM

## 2022-08-26 DIAGNOSIS — E66.9 DIABETES MELLITUS TYPE 2 IN OBESE (HCC): ICD-10-CM

## 2022-08-26 DIAGNOSIS — I83.90 VARICOSE VEIN: ICD-10-CM

## 2022-08-26 DIAGNOSIS — Z00.00 ANNUAL PHYSICAL EXAM: Primary | ICD-10-CM

## 2022-08-26 DIAGNOSIS — R60.9 EDEMA, UNSPECIFIED TYPE: ICD-10-CM

## 2022-08-26 DIAGNOSIS — Z00.00 ANNUAL PHYSICAL EXAM: ICD-10-CM

## 2022-08-26 LAB
BASOPHILS # BLD AUTO: 0.05 X10(3) UL (ref 0–0.2)
BASOPHILS NFR BLD AUTO: 0.9 %
DEPRECATED RDW RBC AUTO: 42.6 FL (ref 35.1–46.3)
EOSINOPHIL # BLD AUTO: 0.2 X10(3) UL (ref 0–0.7)
EOSINOPHIL NFR BLD AUTO: 3.4 %
ERYTHROCYTE [DISTWIDTH] IN BLOOD BY AUTOMATED COUNT: 12.8 % (ref 11–15)
HCT VFR BLD AUTO: 41.8 %
HGB BLD-MCNC: 13.8 G/DL
IMM GRANULOCYTES # BLD AUTO: 0.02 X10(3) UL (ref 0–1)
IMM GRANULOCYTES NFR BLD: 0.3 %
LYMPHOCYTES # BLD AUTO: 2.01 X10(3) UL (ref 1–4)
LYMPHOCYTES NFR BLD AUTO: 34.2 %
MCH RBC QN AUTO: 30.3 PG (ref 26–34)
MCHC RBC AUTO-ENTMCNC: 33 G/DL (ref 31–37)
MCV RBC AUTO: 91.7 FL
MONOCYTES # BLD AUTO: 0.42 X10(3) UL (ref 0.1–1)
MONOCYTES NFR BLD AUTO: 7.2 %
NEUTROPHILS # BLD AUTO: 3.17 X10 (3) UL (ref 1.5–7.7)
NEUTROPHILS # BLD AUTO: 3.17 X10(3) UL (ref 1.5–7.7)
NEUTROPHILS NFR BLD AUTO: 54 %
PLATELET # BLD AUTO: 285 10(3)UL (ref 150–450)
RBC # BLD AUTO: 4.56 X10(6)UL
TSI SER-ACNC: 0.57 MIU/ML (ref 0.36–3.74)
WBC # BLD AUTO: 5.9 X10(3) UL (ref 4–11)

## 2022-08-26 PROCEDURE — 85025 COMPLETE CBC W/AUTO DIFF WBC: CPT

## 2022-08-26 PROCEDURE — 3008F BODY MASS INDEX DOCD: CPT | Performed by: FAMILY MEDICINE

## 2022-08-26 PROCEDURE — 84443 ASSAY THYROID STIM HORMONE: CPT

## 2022-08-26 PROCEDURE — 36415 COLL VENOUS BLD VENIPUNCTURE: CPT

## 2022-08-26 PROCEDURE — 99214 OFFICE O/P EST MOD 30 MIN: CPT | Performed by: FAMILY MEDICINE

## 2022-08-26 PROCEDURE — 3074F SYST BP LT 130 MM HG: CPT | Performed by: FAMILY MEDICINE

## 2022-08-26 PROCEDURE — 3078F DIAST BP <80 MM HG: CPT | Performed by: FAMILY MEDICINE

## 2022-08-26 RX ORDER — MELOXICAM 7.5 MG/1
TABLET ORAL
Qty: 40 TABLET | Refills: 0 | Status: SHIPPED | OUTPATIENT
Start: 2022-08-26

## 2022-08-26 RX ORDER — ALBUTEROL SULFATE 90 UG/1
2 AEROSOL, METERED RESPIRATORY (INHALATION) EVERY 6 HOURS PRN
Qty: 3 EACH | Refills: 1 | Status: SHIPPED | OUTPATIENT
Start: 2022-08-26

## 2022-08-30 ENCOUNTER — TELEPHONE (OUTPATIENT)
Dept: FAMILY MEDICINE CLINIC | Facility: CLINIC | Age: 57
End: 2022-08-30

## 2022-08-30 NOTE — TELEPHONE ENCOUNTER
Patient's daughter requests order for compression stockings be sent via [employer].   Order copied and pasted in [employer] message

## 2022-08-31 RX ORDER — GLIMEPIRIDE 2 MG/1
2 TABLET ORAL
Qty: 90 TABLET | Refills: 1 | Status: CANCELLED | OUTPATIENT
Start: 2022-08-31

## 2022-08-31 NOTE — TELEPHONE ENCOUNTER
Daughter called in and would like to know if she can  a copy of the prescription for compression stockings listed under medications as she would like to take it to another pharmacy.

## 2022-09-01 NOTE — TELEPHONE ENCOUNTER
[employer] message sent to pt.  Form at  for pickup

## 2022-10-06 RX ORDER — BLOOD SUGAR DIAGNOSTIC
STRIP MISCELLANEOUS
Qty: 200 STRIP | Refills: 0 | Status: SHIPPED | OUTPATIENT
Start: 2022-10-06 | End: 2023-02-08

## 2022-12-12 ENCOUNTER — MED REC SCAN ONLY (OUTPATIENT)
Dept: FAMILY MEDICINE CLINIC | Facility: CLINIC | Age: 57
End: 2022-12-12

## 2022-12-12 ENCOUNTER — TELEPHONE (OUTPATIENT)
Dept: FAMILY MEDICINE CLINIC | Facility: CLINIC | Age: 57
End: 2022-12-12

## 2023-01-03 ENCOUNTER — OFFICE VISIT (OUTPATIENT)
Dept: ENDOCRINOLOGY CLINIC | Facility: CLINIC | Age: 58
End: 2023-01-03
Payer: MEDICAID

## 2023-01-03 VITALS
DIASTOLIC BLOOD PRESSURE: 71 MMHG | SYSTOLIC BLOOD PRESSURE: 122 MMHG | BODY MASS INDEX: 32 KG/M2 | WEIGHT: 195 LBS | HEART RATE: 70 BPM

## 2023-01-03 DIAGNOSIS — E11.65 TYPE 2 DIABETES MELLITUS WITH HYPERGLYCEMIA, UNSPECIFIED WHETHER LONG TERM INSULIN USE (HCC): Primary | ICD-10-CM

## 2023-01-03 DIAGNOSIS — E78.5 DYSLIPIDEMIA: ICD-10-CM

## 2023-01-03 LAB
CARTRIDGE LOT#: ABNORMAL NUMERIC
GLUCOSE BLOOD: 182
HEMOGLOBIN A1C: 7.3 % (ref 4.3–5.6)
TEST STRIP LOT #: NORMAL NUMERIC

## 2023-01-03 PROCEDURE — 3051F HG A1C>EQUAL 7.0%<8.0%: CPT | Performed by: INTERNAL MEDICINE

## 2023-01-03 PROCEDURE — 83036 HEMOGLOBIN GLYCOSYLATED A1C: CPT | Performed by: INTERNAL MEDICINE

## 2023-01-03 PROCEDURE — 82947 ASSAY GLUCOSE BLOOD QUANT: CPT | Performed by: INTERNAL MEDICINE

## 2023-01-03 PROCEDURE — 3074F SYST BP LT 130 MM HG: CPT | Performed by: INTERNAL MEDICINE

## 2023-01-03 PROCEDURE — 99214 OFFICE O/P EST MOD 30 MIN: CPT | Performed by: INTERNAL MEDICINE

## 2023-01-03 PROCEDURE — 3078F DIAST BP <80 MM HG: CPT | Performed by: INTERNAL MEDICINE

## 2023-01-03 RX ORDER — GLIPIZIDE 5 MG/1
2.5 TABLET ORAL
Qty: 45 TABLET | Refills: 0 | Status: SHIPPED | OUTPATIENT
Start: 2023-01-03

## 2023-02-08 RX ORDER — BLOOD SUGAR DIAGNOSTIC
STRIP MISCELLANEOUS
Qty: 200 STRIP | Refills: 0 | Status: SHIPPED | OUTPATIENT
Start: 2023-02-08

## 2023-02-08 NOTE — TELEPHONE ENCOUNTER
LOV:01/03/23    RTC:3-4months    F/U:No FU     Pending Monthly Supply:orders pending, please approve if appropriate.

## 2023-02-15 ENCOUNTER — HOSPITAL ENCOUNTER (EMERGENCY)
Facility: HOSPITAL | Age: 58
Discharge: LEFT BEFORE DISCHARGE | End: 2023-02-15
Attending: EMERGENCY MEDICINE
Payer: MEDICAID

## 2023-02-15 ENCOUNTER — TELEPHONE (OUTPATIENT)
Dept: FAMILY MEDICINE CLINIC | Facility: CLINIC | Age: 58
End: 2023-02-15

## 2023-02-15 VITALS
DIASTOLIC BLOOD PRESSURE: 80 MMHG | RESPIRATION RATE: 20 BRPM | OXYGEN SATURATION: 99 % | HEART RATE: 95 BPM | TEMPERATURE: 98 F | SYSTOLIC BLOOD PRESSURE: 170 MMHG

## 2023-02-15 DIAGNOSIS — Z01.00 ROUTINE EYE EXAM: Primary | ICD-10-CM

## 2023-02-15 NOTE — TELEPHONE ENCOUNTER
Patient daughter called requesting a referral for ophthalmology, stating patient has an appointment tomorrow.

## 2023-02-16 NOTE — TELEPHONE ENCOUNTER
Good Afternoon Dr Rangel Arcos,    Please see message below and advise. Please have office staff reach out to patient for name of Opthamologist she is seeing today.     I can not pend a referral without a name of dr. Yolanda Moreno you     [facility]

## 2023-03-06 RX ORDER — PEN NEEDLE, DIABETIC 32GX 5/32"
NEEDLE, DISPOSABLE MISCELLANEOUS
Qty: 100 EACH | Refills: 0 | Status: SHIPPED | OUTPATIENT
Start: 2023-03-06

## 2023-03-07 ENCOUNTER — OFFICE VISIT (OUTPATIENT)
Dept: FAMILY MEDICINE CLINIC | Facility: CLINIC | Age: 58
End: 2023-03-07

## 2023-03-07 VITALS
BODY MASS INDEX: 32.99 KG/M2 | WEIGHT: 198 LBS | OXYGEN SATURATION: 98 % | HEART RATE: 77 BPM | HEIGHT: 65 IN | SYSTOLIC BLOOD PRESSURE: 126 MMHG | RESPIRATION RATE: 18 BRPM | DIASTOLIC BLOOD PRESSURE: 70 MMHG

## 2023-03-07 DIAGNOSIS — M54.59 OTHER LOW BACK PAIN: Primary | ICD-10-CM

## 2023-03-07 PROCEDURE — 99213 OFFICE O/P EST LOW 20 MIN: CPT | Performed by: FAMILY MEDICINE

## 2023-03-07 PROCEDURE — 3074F SYST BP LT 130 MM HG: CPT | Performed by: FAMILY MEDICINE

## 2023-03-07 PROCEDURE — 3008F BODY MASS INDEX DOCD: CPT | Performed by: FAMILY MEDICINE

## 2023-03-07 PROCEDURE — 3078F DIAST BP <80 MM HG: CPT | Performed by: FAMILY MEDICINE

## 2023-03-07 RX ORDER — CHOLECALCIFEROL (VITAMIN D3) 50 MCG
1 TABLET ORAL DAILY
Qty: 90 TABLET | Refills: 3 | Status: SHIPPED | OUTPATIENT
Start: 2023-03-07 | End: 2023-04-06

## 2023-03-07 RX ORDER — MELOXICAM 15 MG/1
15 TABLET ORAL DAILY
Qty: 30 TABLET | Refills: 0 | Status: SHIPPED | OUTPATIENT
Start: 2023-03-07

## 2023-03-07 RX ORDER — CYCLOBENZAPRINE HCL 10 MG
10 TABLET ORAL 3 TIMES DAILY
Qty: 30 TABLET | Refills: 1 | Status: SHIPPED | OUTPATIENT
Start: 2023-03-07 | End: 2023-03-27

## 2023-03-07 RX ORDER — MONTELUKAST SODIUM 10 MG/1
10 TABLET ORAL DAILY
COMMUNITY
Start: 2023-01-09

## 2023-03-11 DIAGNOSIS — E11.65 TYPE 2 DIABETES MELLITUS WITH HYPERGLYCEMIA, UNSPECIFIED WHETHER LONG TERM INSULIN USE (HCC): ICD-10-CM

## 2023-03-14 ENCOUNTER — TELEPHONE (OUTPATIENT)
Dept: FAMILY MEDICINE CLINIC | Facility: CLINIC | Age: 58
End: 2023-03-14

## 2023-03-14 DIAGNOSIS — E11.69 DIABETES MELLITUS TYPE 2 IN OBESE (HCC): Primary | ICD-10-CM

## 2023-03-14 DIAGNOSIS — E66.9 DIABETES MELLITUS TYPE 2 IN OBESE (HCC): Primary | ICD-10-CM

## 2023-03-14 NOTE — TELEPHONE ENCOUNTER
LOV: 1/3/23    RTC:  3-4 Months    FU: No FU Appt Scheduled    Last Refill: 8/12/22    6 Month Supply Pending

## 2023-03-14 NOTE — TELEPHONE ENCOUNTER
Patient needs more Compressions stockings, please advise    [employer] ([address]) Does not apply Misc

## 2023-03-21 NOTE — TELEPHONE ENCOUNTER
Daughter calling to provide information to fax order for compression socks. Please fax to A to [address] [place] [address] fax number [phone]. Requesting a call once order has been sent.

## 2023-04-05 RX ORDER — PEN NEEDLE, DIABETIC 32GX 5/32"
NEEDLE, DISPOSABLE MISCELLANEOUS
Qty: 100 EACH | Refills: 0 | Status: SHIPPED | OUTPATIENT
Start: 2023-04-05

## 2023-06-04 ENCOUNTER — APPOINTMENT (OUTPATIENT)
Dept: CT IMAGING | Facility: HOSPITAL | Age: 58
End: 2023-06-04
Attending: EMERGENCY MEDICINE
Payer: MEDICAID

## 2023-06-04 ENCOUNTER — APPOINTMENT (OUTPATIENT)
Dept: GENERAL RADIOLOGY | Facility: HOSPITAL | Age: 58
End: 2023-06-04
Attending: EMERGENCY MEDICINE
Payer: MEDICAID

## 2023-06-04 ENCOUNTER — HOSPITAL ENCOUNTER (EMERGENCY)
Facility: HOSPITAL | Age: 58
Discharge: HOME | End: 2023-06-04
Attending: EMERGENCY MEDICINE
Payer: MEDICAID

## 2023-06-04 VITALS
HEIGHT: 66.93 IN | DIASTOLIC BLOOD PRESSURE: 63 MMHG | WEIGHT: 192 LBS | OXYGEN SATURATION: 94 % | TEMPERATURE: 98 F | HEART RATE: 79 BPM | SYSTOLIC BLOOD PRESSURE: 104 MMHG | RESPIRATION RATE: 16 BRPM | BODY MASS INDEX: 30.13 KG/M2

## 2023-06-04 DIAGNOSIS — J45.901 ASTHMA EXACERBATION, MILD: Primary | ICD-10-CM

## 2023-06-04 LAB
ALBUMIN SERPL-MCNC: 3.5 G/DL (ref 3.4–5)
ALP LIVER SERPL-CCNC: 72 U/L
ALT SERPL-CCNC: 26 U/L
ANION GAP SERPL CALC-SCNC: 3 MMOL/L (ref 0–18)
AST SERPL-CCNC: 15 U/L (ref 15–37)
BASOPHILS # BLD AUTO: 0.03 X10(3) UL (ref 0–0.2)
BASOPHILS NFR BLD AUTO: 0.4 %
BILIRUB DIRECT SERPL-MCNC: 0.1 MG/DL (ref 0–0.2)
BILIRUB SERPL-MCNC: 0.4 MG/DL (ref 0.1–2)
BUN BLD-MCNC: 14 MG/DL (ref 7–18)
BUN/CREAT SERPL: 16.3 (ref 10–20)
CALCIUM BLD-MCNC: 9.4 MG/DL (ref 8.5–10.1)
CHLORIDE SERPL-SCNC: 108 MMOL/L (ref 98–112)
CO2 SERPL-SCNC: 26 MMOL/L (ref 21–32)
CREAT BLD-MCNC: 0.86 MG/DL
D DIMER PPP FEU-MCNC: 0.65 UG/ML FEU (ref ?–0.59)
DEPRECATED RDW RBC AUTO: 41.8 FL (ref 35.1–46.3)
EOSINOPHIL # BLD AUTO: 0.28 X10(3) UL (ref 0–0.7)
EOSINOPHIL NFR BLD AUTO: 4 %
ERYTHROCYTE [DISTWIDTH] IN BLOOD BY AUTOMATED COUNT: 12.5 % (ref 11–15)
GFR SERPLBLD BASED ON 1.73 SQ M-ARVRAT: 78 ML/MIN/1.73M2 (ref 60–?)
GLUCOSE BLD-MCNC: 314 MG/DL (ref 70–99)
HCT VFR BLD AUTO: 39.6 %
HGB BLD-MCNC: 13.2 G/DL
IMM GRANULOCYTES # BLD AUTO: 0.02 X10(3) UL (ref 0–1)
IMM GRANULOCYTES NFR BLD: 0.3 %
LYMPHOCYTES # BLD AUTO: 2.47 X10(3) UL (ref 1–4)
LYMPHOCYTES NFR BLD AUTO: 35.6 %
MCH RBC QN AUTO: 30.4 PG (ref 26–34)
MCHC RBC AUTO-ENTMCNC: 33.3 G/DL (ref 31–37)
MCV RBC AUTO: 91.2 FL
MONOCYTES # BLD AUTO: 0.44 X10(3) UL (ref 0.1–1)
MONOCYTES NFR BLD AUTO: 6.3 %
NEUTROPHILS # BLD AUTO: 3.7 X10 (3) UL (ref 1.5–7.7)
NEUTROPHILS # BLD AUTO: 3.7 X10(3) UL (ref 1.5–7.7)
NEUTROPHILS NFR BLD AUTO: 53.4 %
NT-PROBNP SERPL-MCNC: 218 PG/ML (ref ?–125)
OSMOLALITY SERPL CALC.SUM OF ELEC: 296 MOSM/KG (ref 275–295)
PLATELET # BLD AUTO: 246 10(3)UL (ref 150–450)
POTASSIUM SERPL-SCNC: 3.9 MMOL/L (ref 3.5–5.1)
PROT SERPL-MCNC: 7.4 G/DL (ref 6.4–8.2)
RBC # BLD AUTO: 4.34 X10(6)UL
SODIUM SERPL-SCNC: 137 MMOL/L (ref 136–145)
TROPONIN I HIGH SENSITIVITY: 6 NG/L
WBC # BLD AUTO: 6.9 X10(3) UL (ref 4–11)

## 2023-06-04 PROCEDURE — 96375 TX/PRO/DX INJ NEW DRUG ADDON: CPT

## 2023-06-04 PROCEDURE — 71260 CT THORAX DX C+: CPT | Performed by: EMERGENCY MEDICINE

## 2023-06-04 PROCEDURE — 80048 BASIC METABOLIC PNL TOTAL CA: CPT | Performed by: EMERGENCY MEDICINE

## 2023-06-04 PROCEDURE — 80076 HEPATIC FUNCTION PANEL: CPT | Performed by: EMERGENCY MEDICINE

## 2023-06-04 PROCEDURE — 71045 X-RAY EXAM CHEST 1 VIEW: CPT | Performed by: EMERGENCY MEDICINE

## 2023-06-04 PROCEDURE — 94640 AIRWAY INHALATION TREATMENT: CPT

## 2023-06-04 PROCEDURE — 83880 ASSAY OF NATRIURETIC PEPTIDE: CPT | Performed by: EMERGENCY MEDICINE

## 2023-06-04 PROCEDURE — 85379 FIBRIN DEGRADATION QUANT: CPT | Performed by: EMERGENCY MEDICINE

## 2023-06-04 PROCEDURE — 99285 EMERGENCY DEPT VISIT HI MDM: CPT

## 2023-06-04 PROCEDURE — 85025 COMPLETE CBC W/AUTO DIFF WBC: CPT | Performed by: EMERGENCY MEDICINE

## 2023-06-04 PROCEDURE — 93010 ELECTROCARDIOGRAM REPORT: CPT

## 2023-06-04 PROCEDURE — 96374 THER/PROPH/DIAG INJ IV PUSH: CPT

## 2023-06-04 PROCEDURE — 93005 ELECTROCARDIOGRAM TRACING: CPT

## 2023-06-04 PROCEDURE — 84484 ASSAY OF TROPONIN QUANT: CPT | Performed by: EMERGENCY MEDICINE

## 2023-06-04 RX ORDER — PREDNISONE 20 MG/1
40 TABLET ORAL DAILY
Qty: 10 TABLET | Refills: 0 | Status: SHIPPED | OUTPATIENT
Start: 2023-06-04 | End: 2023-06-09

## 2023-06-04 RX ORDER — METHYLPREDNISOLONE SODIUM SUCCINATE 125 MG/2ML
125 INJECTION, POWDER, LYOPHILIZED, FOR SOLUTION INTRAMUSCULAR; INTRAVENOUS ONCE
Status: COMPLETED | OUTPATIENT
Start: 2023-06-04 | End: 2023-06-04

## 2023-06-04 RX ORDER — IPRATROPIUM BROMIDE AND ALBUTEROL SULFATE 2.5; .5 MG/3ML; MG/3ML
3 SOLUTION RESPIRATORY (INHALATION) ONCE
Status: COMPLETED | OUTPATIENT
Start: 2023-06-04 | End: 2023-06-04

## 2023-06-04 RX ORDER — LORAZEPAM 2 MG/ML
1 INJECTION INTRAMUSCULAR ONCE
Status: COMPLETED | OUTPATIENT
Start: 2023-06-04 | End: 2023-06-04

## 2023-06-04 NOTE — ED INITIAL ASSESSMENT (HPI)
Patient c/o SOB and chest pressure for the past few days. Pt states more SOB when she lays down and cant get a full breath in. Pt denies fevers.  Pt denies n/v/d.

## 2023-06-05 LAB
ATRIAL RATE: 85 BPM
P AXIS: 63 DEGREES
P-R INTERVAL: 194 MS
Q-T INTERVAL: 388 MS
QRS DURATION: 86 MS
QTC CALCULATION (BEZET): 461 MS
R AXIS: 62 DEGREES
T AXIS: 53 DEGREES
VENTRICULAR RATE: 85 BPM

## 2023-06-14 ENCOUNTER — APPOINTMENT (OUTPATIENT)
Dept: CT IMAGING | Facility: HOSPITAL | Age: 58
End: 2023-06-14
Attending: EMERGENCY MEDICINE
Payer: MEDICAID

## 2023-06-14 ENCOUNTER — HOSPITAL ENCOUNTER (EMERGENCY)
Facility: HOSPITAL | Age: 58
Discharge: HOME | End: 2023-06-14
Attending: EMERGENCY MEDICINE
Payer: MEDICAID

## 2023-06-14 VITALS
OXYGEN SATURATION: 98 % | HEIGHT: 65 IN | TEMPERATURE: 99 F | BODY MASS INDEX: 31.99 KG/M2 | RESPIRATION RATE: 16 BRPM | WEIGHT: 192 LBS | SYSTOLIC BLOOD PRESSURE: 115 MMHG | DIASTOLIC BLOOD PRESSURE: 67 MMHG | HEART RATE: 83 BPM

## 2023-06-14 DIAGNOSIS — R10.9 ABDOMINAL PAIN, ACUTE: ICD-10-CM

## 2023-06-14 DIAGNOSIS — R11.2 NAUSEA AND VOMITING IN ADULT: Primary | ICD-10-CM

## 2023-06-14 LAB
ALBUMIN SERPL-MCNC: 3.5 G/DL (ref 3.4–5)
ALBUMIN/GLOB SERPL: 0.9 {RATIO} (ref 1–2)
ALP LIVER SERPL-CCNC: 65 U/L
ALT SERPL-CCNC: 27 U/L
ANION GAP SERPL CALC-SCNC: 7 MMOL/L (ref 0–18)
AST SERPL-CCNC: 11 U/L (ref 15–37)
BASOPHILS # BLD AUTO: 0.02 X10(3) UL (ref 0–0.2)
BASOPHILS NFR BLD AUTO: 0.2 %
BILIRUB SERPL-MCNC: 1.1 MG/DL (ref 0.1–2)
BILIRUB UR QL: NEGATIVE
BUN BLD-MCNC: 17 MG/DL (ref 7–18)
BUN/CREAT SERPL: 19.1 (ref 10–20)
CALCIUM BLD-MCNC: 9.1 MG/DL (ref 8.5–10.1)
CHLORIDE SERPL-SCNC: 105 MMOL/L (ref 98–112)
CLARITY UR: CLEAR
CO2 SERPL-SCNC: 26 MMOL/L (ref 21–32)
CREAT BLD-MCNC: 0.89 MG/DL
DEPRECATED RDW RBC AUTO: 40.7 FL (ref 35.1–46.3)
EOSINOPHIL # BLD AUTO: 0.11 X10(3) UL (ref 0–0.7)
EOSINOPHIL NFR BLD AUTO: 1.3 %
ERYTHROCYTE [DISTWIDTH] IN BLOOD BY AUTOMATED COUNT: 12.4 % (ref 11–15)
GFR SERPLBLD BASED ON 1.73 SQ M-ARVRAT: 75 ML/MIN/1.73M2 (ref 60–?)
GLOBULIN PLAS-MCNC: 4 G/DL (ref 2.8–4.4)
GLUCOSE BLD-MCNC: 283 MG/DL (ref 70–99)
GLUCOSE UR-MCNC: 300 MG/DL
HCT VFR BLD AUTO: 43.5 %
HGB BLD-MCNC: 14.7 G/DL
HGB UR QL STRIP.AUTO: NEGATIVE
IMM GRANULOCYTES # BLD AUTO: 0.03 X10(3) UL (ref 0–1)
IMM GRANULOCYTES NFR BLD: 0.4 %
KETONES UR-MCNC: NEGATIVE MG/DL
LEUKOCYTE ESTERASE UR QL STRIP.AUTO: 75
LIPASE SERPL-CCNC: 25 U/L (ref 13–75)
LYMPHOCYTES # BLD AUTO: 0.78 X10(3) UL (ref 1–4)
LYMPHOCYTES NFR BLD AUTO: 9.3 %
MCH RBC QN AUTO: 30.3 PG (ref 26–34)
MCHC RBC AUTO-ENTMCNC: 33.8 G/DL (ref 31–37)
MCV RBC AUTO: 89.7 FL
MONOCYTES # BLD AUTO: 0.29 X10(3) UL (ref 0.1–1)
MONOCYTES NFR BLD AUTO: 3.5 %
NEUTROPHILS # BLD AUTO: 7.13 X10 (3) UL (ref 1.5–7.7)
NEUTROPHILS # BLD AUTO: 7.13 X10(3) UL (ref 1.5–7.7)
NEUTROPHILS NFR BLD AUTO: 85.3 %
NITRITE UR QL STRIP.AUTO: NEGATIVE
OSMOLALITY SERPL CALC.SUM OF ELEC: 298 MOSM/KG (ref 275–295)
PH UR: 7 [PH] (ref 5–8)
PLATELET # BLD AUTO: 242 10(3)UL (ref 150–450)
POTASSIUM SERPL-SCNC: 4.1 MMOL/L (ref 3.5–5.1)
PROT SERPL-MCNC: 7.5 G/DL (ref 6.4–8.2)
PROT UR-MCNC: NEGATIVE MG/DL
RBC # BLD AUTO: 4.85 X10(6)UL
SODIUM SERPL-SCNC: 138 MMOL/L (ref 136–145)
SP GR UR STRIP: 1.01 (ref 1–1.03)
UROBILINOGEN UR STRIP-ACNC: NORMAL
WBC # BLD AUTO: 8.4 X10(3) UL (ref 4–11)

## 2023-06-14 PROCEDURE — 96361 HYDRATE IV INFUSION ADD-ON: CPT

## 2023-06-14 PROCEDURE — 74177 CT ABD & PELVIS W/CONTRAST: CPT | Performed by: EMERGENCY MEDICINE

## 2023-06-14 PROCEDURE — 81001 URINALYSIS AUTO W/SCOPE: CPT | Performed by: EMERGENCY MEDICINE

## 2023-06-14 PROCEDURE — 80053 COMPREHEN METABOLIC PANEL: CPT | Performed by: EMERGENCY MEDICINE

## 2023-06-14 PROCEDURE — 99284 EMERGENCY DEPT VISIT MOD MDM: CPT

## 2023-06-14 PROCEDURE — 83690 ASSAY OF LIPASE: CPT | Performed by: EMERGENCY MEDICINE

## 2023-06-14 PROCEDURE — 83690 ASSAY OF LIPASE: CPT

## 2023-06-14 PROCEDURE — 87186 SC STD MICRODIL/AGAR DIL: CPT | Performed by: EMERGENCY MEDICINE

## 2023-06-14 PROCEDURE — 87077 CULTURE AEROBIC IDENTIFY: CPT | Performed by: EMERGENCY MEDICINE

## 2023-06-14 PROCEDURE — 85025 COMPLETE CBC W/AUTO DIFF WBC: CPT

## 2023-06-14 PROCEDURE — 99285 EMERGENCY DEPT VISIT HI MDM: CPT

## 2023-06-14 PROCEDURE — 80053 COMPREHEN METABOLIC PANEL: CPT

## 2023-06-14 PROCEDURE — 96374 THER/PROPH/DIAG INJ IV PUSH: CPT

## 2023-06-14 PROCEDURE — 96375 TX/PRO/DX INJ NEW DRUG ADDON: CPT

## 2023-06-14 PROCEDURE — 85025 COMPLETE CBC W/AUTO DIFF WBC: CPT | Performed by: EMERGENCY MEDICINE

## 2023-06-14 PROCEDURE — 87086 URINE CULTURE/COLONY COUNT: CPT | Performed by: EMERGENCY MEDICINE

## 2023-06-14 RX ORDER — MORPHINE SULFATE 4 MG/ML
4 INJECTION, SOLUTION INTRAMUSCULAR; INTRAVENOUS ONCE
Status: COMPLETED | OUTPATIENT
Start: 2023-06-14 | End: 2023-06-14

## 2023-06-14 RX ORDER — ONDANSETRON 2 MG/ML
4 INJECTION INTRAMUSCULAR; INTRAVENOUS ONCE
Status: COMPLETED | OUTPATIENT
Start: 2023-06-14 | End: 2023-06-14

## 2023-06-14 RX ORDER — ONDANSETRON 4 MG/1
4 TABLET, ORALLY DISINTEGRATING ORAL EVERY 4 HOURS PRN
Qty: 10 TABLET | Refills: 0 | Status: SHIPPED | OUTPATIENT
Start: 2023-06-14 | End: 2023-06-21

## 2023-06-14 RX ORDER — DICYCLOMINE HCL 20 MG
20 TABLET ORAL 4 TIMES DAILY PRN
Qty: 30 TABLET | Refills: 0 | Status: SHIPPED | OUTPATIENT
Start: 2023-06-14 | End: 2023-07-14

## 2023-06-14 RX ORDER — MIDAZOLAM HYDROCHLORIDE 1 MG/ML
2 INJECTION INTRAMUSCULAR; INTRAVENOUS ONCE
Status: COMPLETED | OUTPATIENT
Start: 2023-06-14 | End: 2023-06-14

## 2023-06-14 NOTE — ED INITIAL ASSESSMENT (HPI)
Swedish speaking pt came in with daughter for mid abdominal pain with vomiting since last night. Was seen at [facility] today at 2 pm.  Pt is A/O x 4, breathing unlabored.

## 2023-06-22 ENCOUNTER — TELEPHONE (OUTPATIENT)
Dept: ENDOCRINOLOGY CLINIC | Facility: CLINIC | Age: 58
End: 2023-06-22

## 2023-06-22 NOTE — TELEPHONE ENCOUNTER
Received DWO from Jovanna Leon. Filled out required information and placed in providers folder for signature.

## 2023-06-23 NOTE — TELEPHONE ENCOUNTER
Received signed form from provider signed folder, faxing forms to ihsan at [phone].   Awaiting confirmation

## 2023-06-29 ENCOUNTER — TELEPHONE (OUTPATIENT)
Dept: CASE MANAGEMENT | Age: 58
End: 2023-06-29

## 2023-06-29 DIAGNOSIS — E11.9 DIABETIC EYE EXAM (HCC): Primary | ICD-10-CM

## 2023-06-29 DIAGNOSIS — Z01.00 DIABETIC EYE EXAM (HCC): Primary | ICD-10-CM

## 2023-07-05 ENCOUNTER — TELEPHONE (OUTPATIENT)
Dept: NEPHROLOGY | Facility: CLINIC | Age: 58
End: 2023-07-05

## 2023-07-05 RX ORDER — PEN NEEDLE, DIABETIC 32GX 5/32"
1 NEEDLE, DISPOSABLE MISCELLANEOUS DAILY
Qty: 100 EACH | Refills: 0 | Status: SHIPPED | OUTPATIENT
Start: 2023-07-05

## 2023-08-04 ENCOUNTER — TELEPHONE (OUTPATIENT)
Dept: ENDOCRINOLOGY CLINIC | Facility: CLINIC | Age: 58
End: 2023-08-04

## 2023-08-04 RX ORDER — BLOOD SUGAR DIAGNOSTIC
STRIP MISCELLANEOUS
Qty: 200 EACH | Refills: 0 | Status: SHIPPED | OUTPATIENT
Start: 2023-08-04

## 2023-08-04 NOTE — TELEPHONE ENCOUNTER
Prescription resent with ICD-10 diagnosis code. RN checked faxes and forms not yet received by office.

## 2023-08-04 NOTE — TELEPHONE ENCOUNTER
Pharmacy called to request ICD 10 codes and also whether pt is insulin dependent. Please resend Rx. Phone # is  [phone] ext [phone] and fax # is [phone].   They will also fax form to this office       Current Outpatient Medications:       ONETOUCH ULTRA In Vitro Strip, TEST TWICE DAILY, Disp: 200 strip, Rfl: 0

## 2023-08-07 ENCOUNTER — TELEPHONE (OUTPATIENT)
Dept: ENDOCRINOLOGY CLINIC | Facility: CLINIC | Age: 58
End: 2023-08-07

## 2023-08-07 NOTE — TELEPHONE ENCOUNTER
Received a fax from [place], Diabetic Detailed Written Order, was filled out and placed in providers folder for review and signature.

## 2023-09-08 ENCOUNTER — TELEPHONE (OUTPATIENT)
Dept: INTERNAL MEDICINE CLINIC | Facility: CLINIC | Age: 58
End: 2023-09-08

## 2023-09-08 NOTE — TELEPHONE ENCOUNTER
Received a message on nurse triage line. Patient requesting to  her referral for the eye doctor. Referral printed and placed at the  for pickup.

## 2023-09-11 ENCOUNTER — TELEPHONE (OUTPATIENT)
Dept: INTERNAL MEDICINE CLINIC | Facility: CLINIC | Age: 58
End: 2023-09-11

## 2023-09-13 DIAGNOSIS — E11.65 TYPE 2 DIABETES MELLITUS WITH HYPERGLYCEMIA, UNSPECIFIED WHETHER LONG TERM INSULIN USE (HCC): ICD-10-CM

## 2023-09-25 ENCOUNTER — OFFICE VISIT (OUTPATIENT)
Dept: ENDOCRINOLOGY CLINIC | Facility: CLINIC | Age: 58
End: 2023-09-25

## 2023-09-25 VITALS
SYSTOLIC BLOOD PRESSURE: 124 MMHG | DIASTOLIC BLOOD PRESSURE: 80 MMHG | WEIGHT: 200 LBS | BODY MASS INDEX: 33 KG/M2 | HEART RATE: 76 BPM

## 2023-09-25 DIAGNOSIS — E11.65 TYPE 2 DIABETES MELLITUS WITH HYPERGLYCEMIA, UNSPECIFIED WHETHER LONG TERM INSULIN USE (HCC): Primary | ICD-10-CM

## 2023-09-25 DIAGNOSIS — E78.5 DYSLIPIDEMIA: ICD-10-CM

## 2023-09-25 LAB
CARTRIDGE LOT#: ABNORMAL NUMERIC
GLUCOSE BLOOD: 7.8
HEMOGLOBIN A1C: 107 % (ref 4.3–5.6)
TEST STRIP LOT #: NORMAL NUMERIC

## 2023-09-25 NOTE — PROGRESS NOTES
HISTORY OF PRESENT ILLNESS   Alton Gardner is a 60year old female who presents for diabetes.     Other Significant medical hx includes: DM, hyperlipidemia     Diagnosed with diabetes around age 47     Family hx of diabetes denies     Dietary compliance: moderate  Exercise: No.   Polyuria/polydipsia: No  Blurred vision: No    Episodes of hypoglycemia: No  Blood Glucose:  Glucometer:  Checking 2 times a day  Fasting 130-145  Pre meals: 120-125    No low BG under 70    Medications for DM  Metformin 1,000 mg po BID  Victoza 1.8 mg daily    Glipizide 2.5 mg with dinner only --> stopped due to low BG    Avoid SGLT2 with LE infection/ wound     Stopped amaryl due to low BG   Did not tolerate trulicity well     REVIEW OF SYSTEMS  Eyes: Diabetic retinopathy present: No            Most recent visit to eye doctor in last 12 months: Fang 40 : Recent visit to dentist in last 6-12 months no     CV: Cardiovascular disease present: No         Hypertension present: Yes         Hyperlipidemia present: Yes         Peripheral Vascular Disease present: Yes         Heart Failure: No    : Nephropathy present: No    Neuro: Neuropathy present: yes     Skin: Infection or ulceration: No            Follows with Podiatry: No    Feet: Positive History of ulceration/ wound Denies hx of  amputation, Charcot foot, angioplasty or vascular surgery,  Positive hx of  neuropathy (pain, burning, numbness) stable     Osteoporosis: No    Thyroid disease: No    Psychiatric:    Little interest or pleasure in doing things:No   Feeling down, depressed or hopeless : No      + weight gain     Medications:     Current Outpatient Medications:     metFORMIN HCl 1000 MG Oral Tab, TK 1 T PO BID, Disp: 180 tablet, Rfl: 0    Glucose Blood (ONETOUCH ULTRA) In Vitro Strip, Use to check blood sugar levels 2x daily, Disp: 200 each, Rfl: 0    Insulin Pen Needle (TRUEPLUS 5-BEVEL PEN NEEDLES) 32G X 4 MM Does not apply Misc, 1 strip by In Vitro route daily., Disp: 100 each, Rfl: 0    rosuvastatin 20 MG Oral Tab, Take 1 tablet (20 mg total) by mouth daily. , Disp: 90 tablet, Rfl: 1    montelukast 10 MG Oral Tab, Take 1 tablet (10 mg total) by mouth daily. , Disp: , Rfl:     Meloxicam 15 MG Oral Tab, Take 1 tablet (15 mg total) by mouth daily. , Disp: 30 tablet, Rfl: 0    glipiZIDE 5 MG Oral Tab, Take 0.5 tablets (2.5 mg total) by mouth every morning before breakfast., Disp: 45 tablet, Rfl: 0    Elastic Bandages & Supports (MEDICAL COMPRESSION STOCKINGS) Does not apply Misc, 1 each daily. , Disp: 2 each, Rfl: 3    albuterol 108 (90 Base) MCG/ACT Inhalation Aero Soln, Inhale 2 puffs into the lungs every 6 (six) hours as needed for Wheezing., Disp: 3 each, Rfl: 1    Meloxicam 7.5 MG Oral Tab, 1-2 po every day prn, Disp: 40 tablet, Rfl: 0    VICTOZA 18 MG/3ML Subcutaneous Solution Pen-injector, Inject 1.8 mg into the skin daily. , Disp: 27 mL, Rfl: 1    Glucose Blood (ONETOUCH VERIO) In Vitro Strip, TEST ONCE DAILY, Disp: 100 strip, Rfl: 0    ONETOUCH DELICA PLUS [id number] Does not apply Misc, TEST ONCE DAILY, Disp: 100 each, Rfl: 0    cyclobenzaprine 10 MG Oral Tab, Take 1 tablet (10 mg total) by mouth 3 (three) times daily as needed for Muscle spasms. , Disp: 14 tablet, Rfl: 0    ibuprofen 800 MG Oral Tab, Take 1 tablet (800 mg total) by mouth every 8 (eight) hours as needed for Pain., Disp: 40 tablet, Rfl: 1    omeprazole 20 MG Oral Capsule Delayed Release, Take 1 capsule (20 mg total) by mouth every morning before breakfast., Disp: 30 capsule, Rfl: 2    lisinopril 5 MG Oral Tab, Take 1 tablet (5 mg total) by mouth daily. , Disp: 90 tablet, Rfl: 3    Blood Glucose Monitoring Suppl ([place]) w/Device Does not apply Kit, 1 Device 2 (two) times a day., Disp: 1 kit, Rfl: 0    albuterol (2.5 MG/3ML) 0.083% Inhalation Nebu Soln, Take 3 mL (2.5 mg total) by nebulization every 6 (six) hours as needed for Wheezing., Disp: 75 each, Rfl: 0    Elastic Bandages & Supports (MEDICAL COMPRESSION STOCKINGS) Does not apply Misc, 2 each daily. Wear daily on Lower extremities. , Disp: 2 each, Rfl: 1    Elastic Bandages & Supports (MEDICAL COMPRESSION STOCKINGS) Does not apply Misc, 1 Application by Does not apply route daily. , Disp: 2 each, Rfl: 5     Allergies:     Fish-Derived Produc*    HIVES, WHEEZING  Shellfish-Derived P*    HIVES, WHEEZING    Social History:   Social History    Socioeconomic History      Marital status:       Number of children: 5    Occupational History      Occupation: unemployed    Tobacco Use      Smoking status: Never      Smokeless tobacco: Never    Vaping Use      Vaping Use: Never used    Substance and Sexual Activity      Alcohol use: Not Currently      Drug use: Never      Sexual activity: Yes        Partners: Male    Other Topics      Concerns:        Right Handed: Yes        Caffeine Concern: No        Exercise: Yes        Seat Belt: Yes        Special Diet: No        Stress Concern: No        Weight Concern: No      Medical History:   Past Medical History:   Diagnosis Date    Asthma     Diabetes ([facility] [address]     History of blood clots        Surgical history:   Past Surgical History:   Procedure Laterality Date    COLONOSCOPY           PHYSICAL EXAM    Vitals reviewed      General Appearance:  alert, well developed, in no acute distress  Head: Atraumatic  Eyes:  normal conjunctivae, sclera. , normal sclera and normal pupils  Throat/Neck: normal sound to voice. Normal hearing, normal speech  Respiratory:  Speaking in full sentences, non-labored. no increased work of breathing, no audible wheezing    Neuro: motor grossly intact, moving all extremities without difficulty  Psychiatric:  oriented to time, self, and place  Extremities: no obvious extremity swelling, no lesions    ASSESSMENT/PLAN:    1.  Diabetes Mellitus Type 2 : with fasting hyperglycemia  a1c is 7.8 % ( 9/2023)    Reviewed Diabetes mellitus type 2 is an endocrine disorder with insulin resistance and deficiency, resulting in high blood sugars. Complications can include cardiovascular disease, neuropathy (nerve damage), nephropathy (kidney disease), retinopathy (eye damage)   Discussed importance of glycemic control and patient verbalized understanding of diabetes pathogenesis and glycemic control to prevent the diabetes complications   -Lifestyle/ Diet/ Exercise reviewed the following   -Discussed importance of SBGM  -Low CHO diet, recommend 45gm per meal or 135gm per day  -Low CHO diet and CHO counting  -Checking BG levels at home      Diabetic Medications   Medications for DM    I reviewed adding a long acting insulin   She will like to work on exercise and make some dietary changes at this time    Metformin 1,000 mg po BID  Take with food  GI SE reviewed    Victoza 1.8 mg daily  No personal or family history of MEN syndrome  Patient counselled regarding side effects including injection site reactions, nausea, vomiting, diarrhea, pancreatitis, gastroparesis and rare side effect evelio Mitchell syndrome. Reviewed Risk of Hypoglycemia / Recognition & Treatment - rule of 15       -Reviewed Mechanism of Action/ Risk Benefit / Side effects of each medication     -Patient verbalized understanding and is in agreement to plan for SMDE Diabetes management, agrees to notify clinic of changes in medical care or medications. 2. Hyperlipidemia / Lipids   Reviewed goal lipid/ low fat diet  Stain therapy / Taking at night   Discussed the potential side effects of statins including muscle and liver injury. 3. BP is normal today        Call with BG as discussed. RTC in 3 - 4 months      Patient verbalized a complete  understanding of all of the above and did not have any further questions.            Orders Placed This Encounter      POC Glycohemoglobin [[id number]]      POC HemoCue Glucose 201 (Finger stick glucose)      Marla Camargo MD  This visit was conducted with the help of a [facility] [place] interpretor. Patient verbalized a complete  understanding of all of the above and did not have any further questions.

## 2023-10-04 DIAGNOSIS — E11.65 TYPE 2 DIABETES MELLITUS WITH HYPERGLYCEMIA, UNSPECIFIED WHETHER LONG TERM INSULIN USE (HCC): ICD-10-CM

## 2023-10-06 NOTE — TELEPHONE ENCOUNTER
Please review; protocol failed. Please see patients MyChart Message   Alton JOE Em Triage Support2 days ago       Refills have been requested for the following medications:         Elastic Bandages & Supports ([address]) Does not apply Misc [Shaina Real]      Patient Comment: Please let me know when rx is ready. needed ASAP          albuterol 108 (90 Base) MCG/ACT Inhalation Aero Soln [Shania Real]  Requested Prescriptions   Pending Prescriptions Disp Refills    Elastic Bandages & Supports (MEDICAL COMPRESSION STOCKINGS) Does not apply Misc 2 each 3     Si each daily. There is no refill protocol information for this order       albuterol 108 (90 Base) MCG/ACT Inhalation Aero Soln 3 each 1     Sig: Inhale 2 puffs into the lungs every 6 (six) hours as needed for Wheezing.        Asthma & COPD Medication Protocol Failed - 10/4/2023  2:16 PM        Failed - In person appointment or virtual visit in the past 6 mos or appointment in next 3 mos     Recent Outpatient Visits              1 week ago Type 2 diabetes mellitus with hyperglycemia, unspecified whether long term insulin use ([address]    Ryan Leary MD    Office Visit    7 months ago Other low back pain    Jennifer Glover MD    Office Visit    9 months ago Type 2 diabetes mellitus with hyperglycemia, unspecified whether long term insulin use ([address]    Fly Love MD    Office Visit    1 year ago Annual physical exam    Jackie Albert MD    Office Visit    1 year ago Type 2 diabetes mellitus with hyperglycemia, unspecified whether long term insulin use ([address]    Mckenna Gracia MD    Office Visit          Future Appointments         Provider Department Appt Notes    In 1 month Orly Morales DPM [facility], [address], [facility] and diabetic foot care                       Recent Outpatient Visits              1 week ago Type 2 diabetes mellitus with hyperglycemia, unspecified whether long term insulin use ([facility] [address]    Calos Byrd MD    Office Visit    7 months ago Other low back pain    [address] [facility], Rox Menjivar MD    Office Visit    9 months ago Type 2 diabetes mellitus with hyperglycemia, unspecified whether long term insulin use ([facility] [address]    [address], America Fleming MD    Office Visit    1 year ago Annual physical exam    Destiney Napier MD    Office Visit    1 year ago Type 2 diabetes mellitus with hyperglycemia, unspecified whether long term insulin use ([facility] [address]    George Beasley, Sebastian Tapia MD    Office Visit          Future Appointments         Provider Department Appt Notes    In 1 month Orly Morales DPM [facility], [address], [place] and diabetic foot care

## 2023-10-07 RX ORDER — ALBUTEROL SULFATE 90 UG/1
2 AEROSOL, METERED RESPIRATORY (INHALATION) EVERY 6 HOURS PRN
Qty: 3 EACH | Refills: 1 | Status: SHIPPED | OUTPATIENT
Start: 2023-10-07

## 2023-10-10 ENCOUNTER — MED REC SCAN ONLY (OUTPATIENT)
Dept: FAMILY MEDICINE CLINIC | Facility: CLINIC | Age: 58
End: 2023-10-10

## 2023-10-14 ENCOUNTER — TELEPHONE (OUTPATIENT)
Dept: ENDOCRINOLOGY CLINIC | Facility: CLINIC | Age: 58
End: 2023-10-14

## 2023-10-14 NOTE — TELEPHONE ENCOUNTER
Received fax from [employer] stating that Victoza requires PA.     Medication  CGM  pump supply Requested: Victoza 18mg /3ml    Sig: Inject 1.8mg into the skin daily.    DX Code: E11.65                                       Case Number/Pending Ref#:

## 2023-10-16 NOTE — TELEPHONE ENCOUNTER
Medication PA Requested:     VICTOZA 18 MG/3ML Subcutaneous Solution Pen-injector                                                      CoverMyMeds Used: No  Key:  Quantity: 27 ML  Day Supply: 30  Sig:  Inject 1.8 mg into the skin daily.   DX Code:       E11.65                              CPT code (if applicable):   Case Number/Pending Ref#:    ePA submitted with LOV and A1c 9/25/23   Awaiting determination

## 2023-10-19 RX ORDER — ROSUVASTATIN CALCIUM 20 MG/1
20 TABLET, COATED ORAL DAILY
Qty: 90 TABLET | Refills: 1 | Status: SHIPPED | OUTPATIENT
Start: 2023-10-19

## 2023-11-06 ENCOUNTER — OFFICE VISIT (OUTPATIENT)
Dept: PODIATRY CLINIC | Facility: CLINIC | Age: 58
End: 2023-11-06
Payer: MEDICARE

## 2023-11-06 DIAGNOSIS — E11.9 TYPE 2 DIABETES MELLITUS WITHOUT RETINOPATHY (HCC): Primary | ICD-10-CM

## 2023-11-06 PROCEDURE — 99203 OFFICE O/P NEW LOW 30 MIN: CPT | Performed by: PODIATRIST

## 2023-11-06 NOTE — PROGRESS NOTES
[facility], [facility] Podiatry  Progress Note    Slim Parrish is a 60year old female. Patient presents with:  Diabetic Foot Care: Consult Diabetic Foot Care.  this morning, on 9/25/23 A1C= 7.6 per Patient, same day saw Dr Tristan Riley MD Endocrinologist.        HPI:     This is a pleasant type 2 diabetic female. She presents to clinic today for diabetic foot exam.  She denies any foot pain or neuropathy. Allergies: Fish-Derived Products and Shellfish-Derived Products   Current Outpatient Medications   Medication Sig Dispense Refill    rosuvastatin 20 MG Oral Tab Take 1 tablet (20 mg total) by mouth daily. 90 tablet 1    Elastic Bandages & Supports (MEDICAL COMPRESSION STOCKINGS) Does not apply Misc 1 each daily. 2 each 3    albuterol 108 (90 Base) MCG/ACT Inhalation Aero Soln Inhale 2 puffs into the lungs every 6 (six) hours as needed for Wheezing. 3 each 1    metFORMIN HCl 1000 MG Oral Tab TK 1 T PO  tablet 0    Glucose Blood (ONETOUCH ULTRA) In Vitro Strip Use to check blood sugar levels 2x daily 200 each 0    Insulin Pen Needle (TRUEPLUS 5-BEVEL PEN NEEDLES) 32G X 4 MM Does not apply Misc 1 strip by In Vitro route daily. 100 each 0    montelukast 10 MG Oral Tab Take 1 tablet (10 mg total) by mouth daily. glipiZIDE 5 MG Oral Tab Take 0.5 tablets (2.5 mg total) by mouth every morning before breakfast. 45 tablet 0    VICTOZA 18 MG/3ML Subcutaneous Solution Pen-injector Inject 1.8 mg into the skin daily. 27 mL 1    Glucose Blood (ONETOUCH VERIO) In Vitro Strip TEST ONCE DAILY 100 strip 0    ONETOUCH DELICA PLUS [id number] Does not apply Misc TEST ONCE DAILY 100 each 0    cyclobenzaprine 10 MG Oral Tab Take 1 tablet (10 mg total) by mouth 3 (three) times daily as needed for Muscle spasms.  14 tablet 0    omeprazole 20 MG Oral Capsule Delayed Release Take 1 capsule (20 mg total) by mouth every morning before breakfast. 30 capsule 2    lisinopril 5 MG Oral Tab Take 1 tablet (5 mg total) by mouth daily. 90 tablet 3    Blood Glucose Monitoring Suppl (ONETOUCH VERIO) w/Device Does not apply Kit 1 Device 2 (two) times a day. 1 kit 0    albuterol (2.5 MG/3ML) 0.083% Inhalation Nebu Soln Take 3 mL (2.5 mg total) by nebulization every 6 (six) hours as needed for Wheezing. 75 each 0    Elastic Bandages & Supports (MEDICAL COMPRESSION STOCKINGS) Does not apply Misc 1 Application by Does not apply route daily. 2 each 5    Meloxicam 15 MG Oral Tab Take 1 tablet (15 mg total) by mouth daily. (Patient not taking: Reported on 11/6/2023) 30 tablet 0    Meloxicam 7.5 MG Oral Tab 1-2 po every day prn (Patient not taking: Reported on 11/6/2023) 40 tablet 0    ibuprofen 800 MG Oral Tab Take 1 tablet (800 mg total) by mouth every 8 (eight) hours as needed for Pain. (Patient not taking: Reported on 11/6/2023) 40 tablet 1    Elastic Bandages & Supports (MEDICAL COMPRESSION STOCKINGS) Does not apply Misc 2 each daily. Wear daily on Lower extremities.  2 each 1      Past Medical History:   Diagnosis Date    Asthma     Diabetes ([address]     History of blood clots       Past Surgical History:   Procedure Laterality Date    COLONOSCOPY        Family History   Problem Relation Age of Onset    No Known Problems Mother     No Known Problems Father     Diabetes Sister     Diabetes Brother       Social History    Socioeconomic History      Marital status:       Number of children: 5    Occupational History      Occupation: unemployed    Tobacco Use      Smoking status: Never      Smokeless tobacco: Never    Vaping Use      Vaping Use: Never used    Substance and Sexual Activity      Alcohol use: Not Currently      Drug use: Never      Sexual activity: Yes        Partners: Male    Other Topics      Concerns:        Right Handed: Yes        Caffeine Concern: No        Exercise: Yes        Seat Belt: Yes        Special Diet: No        Stress Concern: No        Weight Concern: No          REVIEW OF SYSTEMS:   Denies nausea, fever, chills  No calf pain  No other muscle or joint aches  Denies chest pain or shortness of breath. EXAM:   There were no vitals taken for this visit. Constitutional:   Patient in no apparent distress. Well kept. Of normal body habitus. Alert and oriented to person, place, and time. Vascular Examination:  DP pulse is 2/4  PT pulse is 2/4  Capillary refill is immediate  Integumentary Examination:   Digital hair growth is present left and is present right. Skin is of good turgor  Neurological Examination:  Monofilament (10-g) sensation is 5/5 to right and 5/5 to left. Sharp/dull is present to right and is present to left. Parasthesias absent. Musculoskeletal Examination:  Muscle Strength is 5/5. LABS & IMAGING:     Lab Results   Component Value Date     (H) 06/14/2023    BUN 17 06/14/2023    CREATSERUM 0.89 06/14/2023    BUNCREA 19.1 06/14/2023    ANIONGAP 7 06/14/2023    GFRAA 111 09/24/2021    GFRNAA 96 09/24/2021    CA 9.1 06/14/2023     06/14/2023    K 4.1 06/14/2023     06/14/2023    CO2 26.0 06/14/2023    OSMOCALC 298 (H) 06/14/2023        Lab Results   Component Value Date     (H) 11/11/2020    A1C 107 (A) 09/25/2023        No results found. ASSESSMENT AND PLAN:   Diagnoses and all orders for this visit:    Type 2 diabetes mellitus without retinopathy ([facility] [address]        Plan:     Diabetic education and instructions have been provided. We reviewed and discussed the following:    -risk categories related to pts with diabetes and foot or lower extremity complications per ADA. -adherence to medication regimen and close monitoring or blood sugar control.   -daily monitoring/inspection of feet and shoes.   -proper management of diet and weight   -regular follow up with PCP and specialty providers as recommended   -Lower extremity complications related to DM were reviewed and stressed prevention. RTC 1 year for CDFE    No follow-ups on file.     Rosa Antonio, ITZEL  11/6/2023

## 2023-11-13 NOTE — TELEPHONE ENCOUNTER
VICTOZA 18 MG/3ML Subcutaneous Solution Pen-injector, Inject 1.8 mg into the skin daily. , Disp: 27 mL, Rfl: 1

## 2023-11-14 RX ORDER — LIRAGLUTIDE 6 MG/ML
1.8 INJECTION SUBCUTANEOUS DAILY
Qty: 27 ML | Refills: 0 | Status: SHIPPED | OUTPATIENT
Start: 2023-11-14 | End: 2023-11-17

## 2023-11-15 ENCOUNTER — TELEPHONE (OUTPATIENT)
Dept: FAMILY MEDICINE CLINIC | Facility: CLINIC | Age: 58
End: 2023-11-15

## 2023-11-15 NOTE — TELEPHONE ENCOUNTER
Patients daughter is calling and requesting a refill on the following items. She is asking that a paper copy is printed our of this script as she has to have this completed at a medical supply store. Per daughter she is completley out of them.     Elastic Bandages & Supports (MEDICAL COMPRESSION STOCKINGS) Does not apply Misc

## 2023-11-17 RX ORDER — LIRAGLUTIDE 6 MG/ML
1.8 INJECTION SUBCUTANEOUS DAILY
Qty: 27 ML | Refills: 0 | Status: SHIPPED | OUTPATIENT
Start: 2023-11-17

## 2023-12-21 RX ORDER — PEN NEEDLE, DIABETIC 32GX 5/32"
1 NEEDLE, DISPOSABLE MISCELLANEOUS DAILY
Qty: 100 EACH | Refills: 0 | Status: SHIPPED | OUTPATIENT
Start: 2023-12-21

## 2024-01-22 ENCOUNTER — OFFICE VISIT (OUTPATIENT)
Dept: ENDOCRINOLOGY CLINIC | Facility: CLINIC | Age: 59
End: 2024-01-22

## 2024-01-22 VITALS
WEIGHT: 200 LBS | BODY MASS INDEX: 33.32 KG/M2 | SYSTOLIC BLOOD PRESSURE: 127 MMHG | HEART RATE: 77 BPM | HEIGHT: 65 IN | DIASTOLIC BLOOD PRESSURE: 76 MMHG

## 2024-01-22 DIAGNOSIS — E78.5 DYSLIPIDEMIA: Primary | ICD-10-CM

## 2024-01-22 DIAGNOSIS — E11.69 TYPE 2 DIABETES MELLITUS WITH OTHER SPECIFIED COMPLICATION, WITHOUT LONG-TERM CURRENT USE OF INSULIN (HCC): ICD-10-CM

## 2024-01-22 LAB
CARTRIDGE LOT#: ABNORMAL NUMERIC
GLUCOSE BLOOD: 89
HEMOGLOBIN A1C: 6.2 % (ref 4.3–5.6)
TEST STRIP LOT #: NORMAL NUMERIC

## 2024-01-22 PROCEDURE — 3044F HG A1C LEVEL LT 7.0%: CPT | Performed by: INTERNAL MEDICINE

## 2024-01-22 PROCEDURE — 83036 HEMOGLOBIN GLYCOSYLATED A1C: CPT | Performed by: INTERNAL MEDICINE

## 2024-01-22 PROCEDURE — 3008F BODY MASS INDEX DOCD: CPT | Performed by: INTERNAL MEDICINE

## 2024-01-22 PROCEDURE — 3074F SYST BP LT 130 MM HG: CPT | Performed by: INTERNAL MEDICINE

## 2024-01-22 PROCEDURE — 3078F DIAST BP <80 MM HG: CPT | Performed by: INTERNAL MEDICINE

## 2024-01-22 PROCEDURE — 99214 OFFICE O/P EST MOD 30 MIN: CPT | Performed by: INTERNAL MEDICINE

## 2024-01-22 PROCEDURE — 82947 ASSAY GLUCOSE BLOOD QUANT: CPT | Performed by: INTERNAL MEDICINE

## 2024-01-22 RX ORDER — ROSUVASTATIN CALCIUM 20 MG/1
20 TABLET, COATED ORAL DAILY
Qty: 90 TABLET | Refills: 1 | Status: SHIPPED | OUTPATIENT
Start: 2024-01-22

## 2024-01-22 RX ORDER — BLOOD SUGAR DIAGNOSTIC
STRIP MISCELLANEOUS
Qty: 100 STRIP | Refills: 1 | Status: SHIPPED | OUTPATIENT
Start: 2024-01-22

## 2024-01-22 NOTE — PROGRESS NOTES
HISTORY OF PRESENT ILLNESS   Young Abarca is a 59 year old female who presents for diabetes.    Other Significant medical hx includes: DM, hyperlipidemia     Diagnosed with diabetes around age 52     Family hx of diabetes denies     Dietary compliance: moderate  Exercise: No.   Polyuria/polydipsia: No  Blurred vision: No    Episodes of hypoglycemia: No  Blood Glucose:  Glucometer:  Checking 1 times a day  Fasting 100-115    No low BG under 70    Medications for DM  Metformin 1,000 mg po BID  Victoza 1.8 mg daily        Glipizide 2.5 mg with dinner only --> stopped due to low BG    Avoid SGLT2 with LE infection/ wound     Stopped amaryl due to low BG   Did not tolerate trulicity well     REVIEW OF SYSTEMS  Eyes: Diabetic retinopathy present: No            Most recent visit to eye doctor in last 12 months: 2023    Oral/ Dental Care : Recent visit to dentist in last 6-12 months no     CV: Cardiovascular disease present: No         Hypertension present: Yes         Hyperlipidemia present: Yes         Peripheral Vascular Disease present: no         Heart Failure: No    : Nephropathy present: No    Neuro: Neuropathy present: yes     Skin: Infection or ulceration: No            Follows with Podiatry: yes    Feet: Denies hx of  amputation, Charcot foot, angioplasty or vascular surgery,  Positive hx of  neuropathy (pain, burning, numbness) stable     Osteoporosis: No    Thyroid disease: No    Psychiatric:    Little interest or pleasure in doing things:No   Feeling down, depressed or hopeless : No        Medications:     Current Outpatient Medications:     rosuvastatin 20 MG Oral Tab, Take 1 tablet (20 mg total) by mouth daily., Disp: 90 tablet, Rfl: 1    Glucose Blood (ONETOUCH VERIO) In Vitro Strip, Check sugars once a day, Disp: 100 strip, Rfl: 1    Insulin Pen Needle (TRUEPLUS 5-BEVEL PEN NEEDLES) 32G X 4 MM Does not apply Misc, 1 strip by In Vitro route daily., Disp: 100 each, Rfl: 0    VICTOZA 18 MG/3ML  Subcutaneous Solution Pen-injector, Inject 1.8 mg into the skin daily., Disp: 27 mL, Rfl: 0    metFORMIN HCl 1000 MG Oral Tab, TK 1 T PO BID, Disp: 180 tablet, Rfl: 0    Elastic Bandages & Supports (MEDICAL COMPRESSION STOCKINGS) Does not apply Misc, 1 each daily., Disp: 2 each, Rfl: 3    albuterol 108 (90 Base) MCG/ACT Inhalation Aero Soln, Inhale 2 puffs into the lungs every 6 (six) hours as needed for Wheezing., Disp: 3 each, Rfl: 1    Glucose Blood (ONETOUCH ULTRA) In Vitro Strip, Use to check blood sugar levels 2x daily, Disp: 200 each, Rfl: 0    montelukast 10 MG Oral Tab, Take 1 tablet (10 mg total) by mouth daily., Disp: , Rfl:     Glucose Blood (ONETOUCH VERIO) In Vitro Strip, TEST ONCE DAILY, Disp: 100 strip, Rfl: 0    ONETOUCH DELICA PLUS [id number] Does not apply Misc, TEST ONCE DAILY, Disp: 100 each, Rfl: 0    cyclobenzaprine 10 MG Oral Tab, Take 1 tablet (10 mg total) by mouth 3 (three) times daily as needed for Muscle spasms., Disp: 14 tablet, Rfl: 0    omeprazole 20 MG Oral Capsule Delayed Release, Take 1 capsule (20 mg total) by mouth every morning before breakfast., Disp: 30 capsule, Rfl: 2    lisinopril 5 MG Oral Tab, Take 1 tablet (5 mg total) by mouth daily., Disp: 90 tablet, Rfl: 3    Blood Glucose Monitoring Suppl (ONETOUCH VERIO) w/Device Does not apply Kit, 1 Device 2 (two) times a day., Disp: 1 kit, Rfl: 0    albuterol (2.5 MG/3ML) 0.083% Inhalation Nebu Soln, Take 3 mL (2.5 mg total) by nebulization every 6 (six) hours as needed for Wheezing., Disp: 75 each, Rfl: 0    Elastic Bandages & Supports (MEDICAL COMPRESSION STOCKINGS) Does not apply Misc, 2 each daily. Wear daily on Lower extremities., Disp: 2 each, Rfl: 1    Elastic Bandages & Supports (MEDICAL COMPRESSION STOCKINGS) Does not apply Misc, 1 Application by Does not apply route daily., Disp: 2 each, Rfl: 5     Allergies:   Allergies   Allergen Reactions    Fish-Derived Products HIVES and WHEEZING    Shellfish-Derived Products HIVES  and WHEEZING       Social History:   Social History     Socioeconomic History    Marital status:     Number of children: 5   Occupational History    Occupation: unemployed   Tobacco Use    Smoking status: Never    Smokeless tobacco: Never   Vaping Use    Vaping Use: Never used   Substance and Sexual Activity    Alcohol use: Not Currently    Drug use: Never    Sexual activity: Yes     Partners: Male   Other Topics Concern    Right Handed Yes    Caffeine Concern No    Exercise Yes    Seat Belt Yes    Special Diet No    Stress Concern No    Weight Concern No       Medical History:   Past Medical History:   Diagnosis Date    Asthma     Diabetes (HCC)     History of blood clots        Surgical history:   Past Surgical History:   Procedure Laterality Date    COLONOSCOPY           PHYSICAL EXAM    Vitals reviewed      General Appearance:  alert, well developed, in no acute distress  Head: Atraumatic  Eyes:  normal conjunctivae, sclera., normal sclera and normal pupils  Throat/Neck: normal sound to voice. Normal hearing, normal speech  Respiratory:  Speaking in full sentences, non-labored. no increased work of breathing, no audible wheezing    Neuro: motor grossly intact, moving all extremities without difficulty  Psychiatric:  oriented to time, self, and place  Extremities: no obvious extremity swelling, no lesions    ASSESSMENT/PLAN:    1. Diabetes Mellitus Type 2 :  a1c is 6.2 % ( 1/2024)    Reviewed Diabetes mellitus type 2 is an endocrine disorder with insulin resistance and deficiency, resulting in high blood sugars. Complications can include cardiovascular disease, neuropathy (nerve damage), nephropathy (kidney disease), retinopathy (eye damage)   Discussed importance of glycemic control and patient verbalized understanding of diabetes pathogenesis and glycemic control to prevent the diabetes complications   -Lifestyle/ Diet/ Exercise reviewed the following   -Discussed importance of SBGM  -Low CHO diet,  recommend 45gm per meal or 135gm per day  -Low CHO diet and CHO counting  -Checking BG levels at home      Diabetic Medications   Medications for DM    Metformin 1,000 mg po BID  Take with food  GI SE reviewed    Victoza 1.8 mg daily  No personal or family history of MEN syndrome  Patient counselled regarding side effects including injection site reactions, nausea, vomiting, diarrhea, pancreatitis, gastroparesis and rare side effect evelio Mitchell syndrome.      Reviewed Risk of Hypoglycemia / Recognition & Treatment - rule of 15       -Reviewed Mechanism of Action/ Risk Benefit / Side effects of each medication     -Patient verbalized understanding and is in agreement to plan for [facility] Diabetes management, agrees to notify clinic of changes in medical care or medications.      2. Hyperlipidemia / Lipids   Reviewed goal lipid/ low fat diet  Stain therapy / Taking at night   Discussed the potential side effects of statins including muscle and liver injury.        3. BP is normal today        Call with BG as discussed.       RTC in 5-6  months      Patient verbalized a complete  understanding of all of the above and did not have any further questions.           Orders Placed This Encounter   Procedures    POC HemoCue Glucose 201 (Finger stick glucose)    POC Glycohemoglobin [41286]    Comp Metabolic Panel [E]    Microalb/Creat Ratio, Random Urine [E]    Lipid Panel [E]       Angelita Corbin MD  This visit was conducted with the help of a [place]kinan interpretation from daughter per patient's wishes. Patient verbalized a complete  understanding of all of the above and did not have any further questions.

## 2024-02-08 ENCOUNTER — OFFICE VISIT (OUTPATIENT)
Dept: FAMILY MEDICINE CLINIC | Facility: CLINIC | Age: 59
End: 2024-02-08

## 2024-02-08 VITALS
RESPIRATION RATE: 16 BRPM | BODY MASS INDEX: 33.22 KG/M2 | HEART RATE: 71 BPM | DIASTOLIC BLOOD PRESSURE: 65 MMHG | OXYGEN SATURATION: 97 % | SYSTOLIC BLOOD PRESSURE: 122 MMHG | HEIGHT: 65 IN | WEIGHT: 199.38 LBS

## 2024-02-08 DIAGNOSIS — T78.40XA ALLERGY, INITIAL ENCOUNTER: Primary | ICD-10-CM

## 2024-02-08 PROBLEM — J44.89 ASTHMA WITH COPD (CHRONIC OBSTRUCTIVE PULMONARY DISEASE): Chronic | Status: ACTIVE | Noted: 2024-02-08

## 2024-02-08 PROBLEM — J44.89 ASTHMA WITH COPD (CHRONIC OBSTRUCTIVE PULMONARY DISEASE) (HCC): Chronic | Status: ACTIVE | Noted: 2024-02-08

## 2024-02-08 PROCEDURE — 3008F BODY MASS INDEX DOCD: CPT | Performed by: FAMILY MEDICINE

## 2024-02-08 PROCEDURE — 99213 OFFICE O/P EST LOW 20 MIN: CPT | Performed by: FAMILY MEDICINE

## 2024-02-08 PROCEDURE — 3078F DIAST BP <80 MM HG: CPT | Performed by: FAMILY MEDICINE

## 2024-02-08 PROCEDURE — 3074F SYST BP LT 130 MM HG: CPT | Performed by: FAMILY MEDICINE

## 2024-02-08 NOTE — PROGRESS NOTES
Subjective:   Patient ID: Young Abarca is a 59 year old female.    HPI  Patient here for clearance for dental procedure   Patient states that will have cleaning if the teeth   She is requesting not to have  anesthesia as each time she has one she has trouble with breathing and feels that she will choke     History/Other:   Review of Systems  Constitutional: Negative.  Negative for activity change, appetite change, diaphoresis and fatigue.   Heent see hpi   Respiratory: Negative.  Negative for apnea, cough, chest tightness and shortness of breath.    Cardiovascular: Negative.  Negative for chest pain, palpitations and leg swelling.   Gastrointestinal: Negative.  Negative for abdominal pain.   Skin: Negative.       Current Outpatient Medications   Medication Sig Dispense Refill    rosuvastatin 20 MG Oral Tab Take 1 tablet (20 mg total) by mouth daily. 90 tablet 1    Glucose Blood (ONETOUCH VERIO) In Vitro Strip Check sugars once a day 100 strip 1    Insulin Pen Needle (TRUEPLUS 5-BEVEL PEN NEEDLES) 32G X 4 MM Does not apply Misc 1 strip by In Vitro route daily. 100 each 0    VICTOZA 18 MG/3ML Subcutaneous Solution Pen-injector Inject 1.8 mg into the skin daily. 27 mL 0    Elastic Bandages & Supports (MEDICAL COMPRESSION STOCKINGS) Does not apply Misc 1 each daily. 2 each 3    albuterol 108 (90 Base) MCG/ACT Inhalation Aero Soln Inhale 2 puffs into the lungs every 6 (six) hours as needed for Wheezing. 3 each 1    metFORMIN HCl 1000 MG Oral Tab TK 1 T PO  tablet 0    Glucose Blood (ONETOUCH ULTRA) In Vitro Strip Use to check blood sugar levels 2x daily 200 each 0    montelukast 10 MG Oral Tab Take 1 tablet (10 mg total) by mouth daily.      Glucose Blood (ONETOUCH VERIO) In Vitro Strip TEST ONCE DAILY 100 strip 0    ONETOUCH DELICA PLUS [id number] Does not apply Misc TEST ONCE DAILY 100 each 0    cyclobenzaprine 10 MG Oral Tab Take 1 tablet (10 mg total) by mouth 3 (three) times daily as needed for Muscle spasms.  14 tablet 0    omeprazole 20 MG Oral Capsule Delayed Release Take 1 capsule (20 mg total) by mouth every morning before breakfast. 30 capsule 2    lisinopril 5 MG Oral Tab Take 1 tablet (5 mg total) by mouth daily. 90 tablet 3    Blood Glucose Monitoring Suppl (ONETOUCH VERIO) w/Device Does not apply Kit 1 Device 2 (two) times a day. 1 kit 0    albuterol (2.5 MG/3ML) 0.083% Inhalation Nebu Soln Take 3 mL (2.5 mg total) by nebulization every 6 (six) hours as needed for Wheezing. 75 each 0    Elastic Bandages & Supports (MEDICAL COMPRESSION STOCKINGS) Does not apply Misc 2 each daily. Wear daily on Lower extremities. 2 each 1    Elastic Bandages & Supports (MEDICAL COMPRESSION STOCKINGS) Does not apply Misc 1 Application by Does not apply route daily. 2 each 5     Allergies:  Allergies   Allergen Reactions    Fish-Derived Products HIVES and WHEEZING    Shellfish-Derived Products HIVES and WHEEZING       Objective:   Physical Exam  Constitutional:       Appearance: Normal appearance.   Cardiovascular:      Rate and Rhythm: Normal rate and regular rhythm.      Pulses: Normal pulses.      Heart sounds: Normal heart sounds.   Pulmonary:      Effort: Pulmonary effort is normal.      Breath sounds: Normal breath sounds.   Musculoskeletal:      Cervical back: Normal range of motion.   Neurological:      Mental Status: She is alert.         Assessment & Plan:   1. Allergy, initial encounter    Needs to see allergist also to see if there is true allergy and what can be done    No orders of the defined types were placed in this encounter.      Meds This Visit:  Requested Prescriptions      No prescriptions requested or ordered in this encounter       Imaging & Referrals:  ALLERGY - INTERNAL

## 2024-02-29 ENCOUNTER — NURSE ONLY (OUTPATIENT)
Dept: ALLERGY | Facility: CLINIC | Age: 59
End: 2024-02-29

## 2024-02-29 ENCOUNTER — OFFICE VISIT (OUTPATIENT)
Dept: ALLERGY | Facility: CLINIC | Age: 59
End: 2024-02-29

## 2024-02-29 VITALS
SYSTOLIC BLOOD PRESSURE: 145 MMHG | HEIGHT: 65 IN | OXYGEN SATURATION: 97 % | DIASTOLIC BLOOD PRESSURE: 80 MMHG | WEIGHT: 199 LBS | BODY MASS INDEX: 33.15 KG/M2 | HEART RATE: 78 BPM

## 2024-02-29 DIAGNOSIS — H10.10 SEASONAL AND PERENNIAL ALLERGIC RHINOCONJUNCTIVITIS: ICD-10-CM

## 2024-02-29 DIAGNOSIS — J30.2 SEASONAL AND PERENNIAL ALLERGIC RHINOCONJUNCTIVITIS: ICD-10-CM

## 2024-02-29 DIAGNOSIS — Z23 FLU VACCINE NEED: ICD-10-CM

## 2024-02-29 DIAGNOSIS — J30.89 ENVIRONMENTAL AND SEASONAL ALLERGIES: Primary | ICD-10-CM

## 2024-02-29 DIAGNOSIS — Z23 NEED FOR COVID-19 VACCINE: ICD-10-CM

## 2024-02-29 DIAGNOSIS — J30.89 SEASONAL AND PERENNIAL ALLERGIC RHINOCONJUNCTIVITIS: ICD-10-CM

## 2024-02-29 DIAGNOSIS — T50.905A ADVERSE EFFECT OF DRUG, INITIAL ENCOUNTER: Primary | ICD-10-CM

## 2024-02-29 PROCEDURE — 99204 OFFICE O/P NEW MOD 45 MIN: CPT | Performed by: ALLERGY & IMMUNOLOGY

## 2024-02-29 PROCEDURE — 95004 PERQ TESTS W/ALRGNC XTRCS: CPT | Performed by: ALLERGY & IMMUNOLOGY

## 2024-02-29 PROCEDURE — 3008F BODY MASS INDEX DOCD: CPT | Performed by: ALLERGY & IMMUNOLOGY

## 2024-02-29 PROCEDURE — 95024 IQ TESTS W/ALLERGENIC XTRCS: CPT | Performed by: ALLERGY & IMMUNOLOGY

## 2024-02-29 PROCEDURE — 3079F DIAST BP 80-89 MM HG: CPT | Performed by: ALLERGY & IMMUNOLOGY

## 2024-02-29 PROCEDURE — 3077F SYST BP >= 140 MM HG: CPT | Performed by: ALLERGY & IMMUNOLOGY

## 2024-02-29 RX ORDER — CHOLECALCIFEROL (VITAMIN D3) 125 MCG
2000 CAPSULE ORAL DAILY
COMMUNITY
Start: 2024-01-03

## 2024-02-29 NOTE — PROGRESS NOTES
Young Abarca is a 59 year old female.    HPI:     Chief Complaint   Patient presents with    Allergies     Patient presents with daughter for possible anesthetic allergy- when receiving anesthetics has throat tightening, denies hives.     Patient is a 59-year-old female who presents for allergy consultation upon referral of her PCP, Dr. Salazar with a chief complaint of allergies    Prior note from visit with PCP reviewed per below  From visit on February 2024    \"HPI  Patient here for clearance for dental procedure   Patient states that will have cleaning if the teeth   She is requesting not to have  anesthesia as each time she has one she has trouble with breathing and feels that she will choke\"     Today pt reports    Allergies ?   Duration: 3 years ago with the dentist  1x in past with dental cleaning   Timing: intermittent   Symptoms: globus   Severity: mild to moderate  No hives, wz, or oral swelling   Status: no change   Triggers: local dental anesthesia   Tried: time,   Pets or smokers: 1 dog    Nonsmoker   Denies asthma symptoms     Hx of asthma, ar ad, or food allergy:     Hx of asthma: alb prn .  Denies current or persistent symptoms    +allergic rhinitis: dust mite, rn, sz nc     COVID-vaccine x 3 doses last in 2022 on record  No flu vaccine on record for this fall             HISTORY:  Past Medical History:   Diagnosis Date    Asthma (HCC)     Diabetes (HCC)     History of blood clots       Past Surgical History:   Procedure Laterality Date    COLONOSCOPY        Family History   Problem Relation Age of Onset    No Known Problems Mother     No Known Problems Father     Diabetes Sister     Diabetes Brother       Social History:   Social History     Socioeconomic History    Marital status:     Number of children: 5   Occupational History    Occupation: unemployed   Tobacco Use    Smoking status: Never     Passive exposure: Never    Smokeless tobacco: Never   Vaping Use    Vaping Use: Never used    Substance and Sexual Activity    Alcohol use: Not Currently    Drug use: Never    Sexual activity: Yes     Partners: Male   Other Topics Concern    Right Handed Yes    Caffeine Concern No    Exercise Yes    Seat Belt Yes    Special Diet No    Stress Concern No    Weight Concern No        Medications (Active prior to today's visit):  Current Outpatient Medications   Medication Sig Dispense Refill    cholecalciferol 50 MCG (2000 UT) Oral Tab Take 1 tablet (2,000 Units total) by mouth daily.      rosuvastatin 20 MG Oral Tab Take 1 tablet (20 mg total) by mouth daily. 90 tablet 1    Glucose Blood (ONETOUCH VERIO) In Vitro Strip Check sugars once a day 100 strip 1    Insulin Pen Needle (TRUEPLUS 5-BEVEL PEN NEEDLES) 32G X 4 MM Does not apply Misc 1 strip by In Vitro route daily. 100 each 0    VICTOZA 18 MG/3ML Subcutaneous Solution Pen-injector Inject 1.8 mg into the skin daily. 27 mL 0    metFORMIN HCl 1000 MG Oral Tab TK 1 T PO  tablet 0    Glucose Blood (ONETOUCH ULTRA) In Vitro Strip Use to check blood sugar levels 2x daily 200 each 0    Glucose Blood (ONETOUCH VERIO) In Vitro Strip TEST ONCE DAILY 100 strip 0    ONETOUCH DELICA PLUS [id number] Does not apply Misc TEST ONCE DAILY 100 each 0    lisinopril 5 MG Oral Tab Take 1 tablet (5 mg total) by mouth daily. 90 tablet 3    Blood Glucose Monitoring Suppl (ONETOUCH VERIO) w/Device Does not apply Kit 1 Device 2 (two) times a day. 1 kit 0    Elastic Bandages & Supports (MEDICAL COMPRESSION STOCKINGS) Does not apply Misc 1 Application by Does not apply route daily. 2 each 5    Elastic Bandages & Supports (MEDICAL COMPRESSION STOCKINGS) Does not apply Misc 1 each daily. (Patient not taking: Reported on 2/29/2024) 2 each 3    albuterol 108 (90 Base) MCG/ACT Inhalation Aero Soln Inhale 2 puffs into the lungs every 6 (six) hours as needed for Wheezing. (Patient not taking: Reported on 2/29/2024) 3 each 1    montelukast 10 MG Oral Tab Take 1 tablet (10 mg total) by  mouth daily. (Patient not taking: Reported on 2/29/2024)      cyclobenzaprine 10 MG Oral Tab Take 1 tablet (10 mg total) by mouth 3 (three) times daily as needed for Muscle spasms. (Patient not taking: Reported on 2/29/2024) 14 tablet 0    omeprazole 20 MG Oral Capsule Delayed Release Take 1 capsule (20 mg total) by mouth every morning before breakfast. (Patient not taking: Reported on 2/29/2024) 30 capsule 2    albuterol (2.5 MG/3ML) 0.083% Inhalation Nebu Soln Take 3 mL (2.5 mg total) by nebulization every 6 (six) hours as needed for Wheezing. (Patient not taking: Reported on 2/29/2024) 75 each 0    Elastic Bandages & Supports (MEDICAL COMPRESSION STOCKINGS) Does not apply Misc 2 each daily. Wear daily on Lower extremities. (Patient not taking: Reported on 2/29/2024) 2 each 1       Allergies:  Allergies   Allergen Reactions    Fish-Derived Products HIVES and WHEEZING    Shellfish-Derived Products HIVES and WHEEZING         ROS:     Allergic/Immuno:  See HPI  Cardiovascular:  Negative for irregular heartbeat/palpitations, chest pain, edema  Constitutional:  Negative night sweats,weight loss, irritability and lethargy  Endocrine:  Negative for cold intolerance, polydipsia and polyphagia  ENMT:  Negative for ear drainage, hearing loss and nasal drainage see hpi   Eyes:  Negative for eye discharge and vision loss  Gastrointestinal:  Negative for abdominal pain, diarrhea and vomiting  Genitourinary:  Negative for dysuria and hematuria  Hema/Lymph:  Negative for easy bleeding and easy bruising  Integumentary:  Negative for pruritus and rash  Musculoskeletal:  Negative for joint symptoms  Neurological:  Negative for dizziness, seizures  Psychiatric:  Negative for inappropriate interaction and psychiatric symptoms  Respiratory:  Negative for cough, dyspnea and wheezing      PHYSICAL EXAM:   Constitutional: responsive, no acute distress noted  Head/Face: NC/Atraumatic  Eyes/Vision: conjunctiva and lids are normal  extraocular motion is intact   Ears/Audiometry: tympanic membranes are normal bilaterally hearing is grossly intact  Nose/Mouth/Throat: nose and throat are clear mucous membranes are moist   Neck/Thyroid: neck is supple without adenopathy  Lymphatic: no abnormal cervical, supraclavicular or axillary adenopathy is noted  Respiratory: normal to inspection lungs are clear to auscultation bilaterally normal respiratory effort   Cardiovascular: regular rate and rhythm no murmurs, gallups, or rubs  Abdomen: soft non-tender non-distended  Skin/Hair: no unusual rashes present  Extremities: no edema, cyanosis, or clubbing  Neurological:Oriented to time, place, person & situation       ASSESSMENT/PLAN:   Assessment   No diagnosis found.      Skin testing today to local anesthetics including lidocaine 2%, bupivacaine 0.5% and mepivacaine 2% was   negative  Reviewed blood testing was without epinephrine    Skin testing today to common indoor and outdoor environmental allergies was positive to dust mite and cat      #1 adverse drug reaction  See above clinical history.  Symptoms of panic-like attack and globus with local anesthetics in the past.  Denies hives rashes lip swelling tongue swelling asthma symptoms  Most likely adverse effect from the local anesthetic from either anesthesia of the sensory nerves and potential motor neurons versus potentially inadvertent epinephrine being absorbed into the bloodstream causing panic attack like symptoms.  Reviewed side effects of epinephrine  See above skin testing to local anesthetics.  If testing is negative then okay to try again in the future.  May request for the dentist to use local anesthetics without epinephrine if possible  Otherwise back in the old and days they would inject Benadryl as a local anesthetic which may be another potential option    #2 allergic rhinitis  Dust mites and seasonal basis.  See above skin testing to screen for allergic triggers  Reviewed avoidance  measures and potential treatment option immunotherapy  May consider trial of Zyrtec 10 mg or Xyzal 5 mg if having significant runny nose sneezing itchy watery eyes  May consider Flonase or Nasacort 2 sprays per nostril once a day if having prominent nasal congestion postnasal drip    #3 asthma  Mild intermittent by history.  Denies symptoms more than 2 days/week.  No ED visits or prednisone over the year.  Albuterol 2 puffs every 4-6 hours if having active coughing wheezing or shortness of breath  Reviewed signs and symptoms of persistent asthma including the rules of 2    #4 flu vaccine recommended in the fall.    #5 COVID-vaccine recommended.  Recommend booster reviewed new booster available since September 2023     Orders This Visit:  No orders of the defined types were placed in this encounter.      Meds This Visit:  Requested Prescriptions      No prescriptions requested or ordered in this encounter       Imaging & Referrals:  None     2/29/2024  Armen Morillo MD      If medication samples were provided today, they were provided solely for patient education and training related to self administration of these medications.  Teaching, instruction and sample was provided to the patient by myself.  Teaching included  a review of potential adverse side effects as well as potential efficacy.  Patient's questions were answered in regards to medication administration and dosing and potential side effects. Teaching was provided via the teach back method

## 2024-02-29 NOTE — PATIENT INSTRUCTIONS
Skin testing with local anesthetics including lidocaine 2% bupivacaine 0.5% and mepivacaine 2% was negative on skin testing.  Positive histamine control      #1 adverse drug reaction  See above clinical history.  Symptoms of panic-like attack and globus with local anesthetics in the past.  Denies hives rashes lip swelling tongue swelling asthma symptoms  Most likely adverse effect from the local anesthetic from either anesthesia of the sensory nerves and potential motor neurons versus potentially inadvertent epinephrine being absorbed into the bloodstream causing panic attack like symptoms.  Reviewed side effects of epinephrine  See above skin testing to local anesthetics.  If testing is negative then okay to try again in the future.  May request for the dentist to use local anesthetics without epinephrine if possible  Otherwise back in the old and days they would inject Benadryl as a local anesthetic which may be another potential option    #2 allergic rhinitis  Dust mites and seasonal basis.  See above skin testing to screen for allergic triggers  Reviewed avoidance measures and potential treatment option immunotherapy  May consider trial of Zyrtec 10 mg or Xyzal 5 mg if having significant runny nose sneezing itchy watery eyes  May consider Flonase or Nasacort 2 sprays per nostril once a day if having prominent nasal congestion postnasal drip    #3 asthma  Mild intermittent by history.  Denies symptoms more than 2 days/week.  No ED visits or prednisone over the year.  Albuterol 2 puffs every 4-6 hours if having active coughing wheezing or shortness of breath  Reviewed signs and symptoms of persistent asthma including the rules of 2    #4 flu vaccine recommended in the fall.    #5 COVID-vaccine recommended.  Recommend booster reviewed new booster available since September 2023

## 2024-03-28 ENCOUNTER — TELEPHONE (OUTPATIENT)
Dept: ENDOCRINOLOGY CLINIC | Facility: CLINIC | Age: 59
End: 2024-03-28

## 2024-03-28 DIAGNOSIS — E11.65 TYPE 2 DIABETES MELLITUS WITH HYPERGLYCEMIA, UNSPECIFIED WHETHER LONG TERM INSULIN USE (HCC): ICD-10-CM

## 2024-03-28 NOTE — TELEPHONE ENCOUNTER
Current Outpatient Medications   Medication Sig Dispense Refill    metFORMIN HCl 1000 MG Oral Tab TK 1 T PO  tablet 0

## 2024-03-28 NOTE — TELEPHONE ENCOUNTER
Medication PA Requested: VICTOZA 18 MG/3ML Subcutaneous Solution Pen                                                           CoverMyMeds Used:  Key:  Quantity: 27 mL   Day Supply: 90  Sig: Inject 1.8 mg into the skin daily   DX Code:       E11.69

## 2024-03-28 NOTE — TELEPHONE ENCOUNTER
Endocrine Refill protocol for metformin    Protocol Criteria:    -Appointment with Endocrinology completed in the last 6 months or scheduled in the next 3 months    -GFR greater than or equal to 40 in the past 12 months     -A1c result <8.5% in the past 6 months      Verify the above has been completed or scheduled in the appropriate timeline. If so can send a 90 day supply with 1 refill.     Last completed office visit:01/22/24  Next scheduled Follow up: no f/u  Last GFR result: 75  Last A1c result: 6.2%

## 2024-03-28 NOTE — TELEPHONE ENCOUNTER
Subjective:      Patient ID: Terell Fuchs is a 49 y.o. male.    he has been referred by Sayda Membreno MD for evaluation and management for   Chief Complaint   Patient presents with    Sleep Apnea     Chief Complaint: Sleep Apnea    HPI:  He presents for a sleep evaluation related to feelings of day time fatigue over past 2 years.  Patient is observed by his wife with loud snoring and gasping at night off and on over past 2 years.   Patient reports non restful' sleep with excessive daytime sleepiness.  He denies morning headache.   Hedenies impairment of activity during wakefulness.  He reports day time napping; duration 1 Hour most days.  Suffield sleepiness score was 22.  Neck circumference is 50 cm. (19 1/2 inches).  He reports recent weight gain, 50 lbs over past 2 years.  Mallampati score 3  Cardiovascular risk factors: obesity  Bed time is 1000  Wake time is 0700  Sleep onset is within  15 Minutes.  Sleep maintenance difficulties related to early morning awakening, frequent night time awakening and non-restful sleep  Wake after sleep onset occurs more than five times a night.  Nocturia occurs two times a night,   Sleep aids : No  Dry mouth : Yes,   Sleep walking: No,   Sleep talking : No,   Sleep eating:No  Vivid Dreams : No,   Cataplexy : No,   Hypnogogic hallucinations:  No    STOP - BANG Questionnaire:     1. Snoring : Do you snore loudly ?    Yes  2. Tired : Do you often feel tired, fatigued, or sleepy during daytime?  Yes  3. Observed: Has anyone observed you stop breathing during your sleep?    Yes   4. Blood pressure : Do you have or are you being treated for high blood pressure?   No  5. BMI :BMI more than 35 kg/m2?   Yes  6. Age : Age over 50 yr old?   No  7. Neck circumference: Neck circumference greater than 40 cm?   Yes  8. Gender: Gender male?   Yes    SCORE: 6    High risk of WINNIE: Yes 5 - 8  Intermediate risk of WINNIE: Yes 3 - 4  Low risk of WINNIE: Yes 0 - 2    Previous Report  Current Outpatient Medications   Medication Sig Dispense Refill    VICTOZA 18 MG/3ML Subcutaneous Solution Pen-injector Inject 1.8 mg into the skin daily. 27 mL 0     Key: [id number]     Reviewed: office notes     Past Medical History: The following portions of the patient's history were reviewed and updated as appropriate:   He  has a past surgical history that includes Lumbar spine surgery and desmoid cyst.  His family history includes Prostate cancer in his father.  He  reports that he has never smoked. He has never used smokeless tobacco. He reports that he drinks alcohol. He reports that he does not use drugs.  He has a current medication list which includes the following prescription(s): aspirin, buspirone, fexofenadine, fluvirin [id number], lorazepam, omeprazole, pravastatin, triamcinolone, venlafaxine, clindamycin phosphate 1%, and clobetasol.  He is allergic to macrolide antibiotics; atorvastatin; and erythromycin..    Review of Systems   Constitutional: Negative for fever, chills, weight loss, weight gain, activity change, appetite change, fatigue and night sweats.   HENT: Negative for postnasal drip, rhinorrhea, sinus pressure, voice change and congestion.    Eyes: Negative for redness and itching.   Respiratory: Negative for snoring, cough, sputum production, chest tightness, shortness of breath, wheezing, orthopnea, asthma nighttime symptoms, dyspnea on extertion, use of rescue inhaler and somnolence.    Cardiovascular: Negative for chest pain, palpitations and leg swelling.   Genitourinary: Negative for difficulty urinating and hematuria.   Endocrine: Negative for polydipsia, polyphagia, cold intolerance, heat intolerance and polyuria.    Musculoskeletal: Negative for arthralgias, back pain, gait problem, joint swelling and myalgias.   Skin: Negative.    Gastrointestinal: Positive for acid reflux (controlled ). Negative for nausea, vomiting and abdominal pain.   Neurological: Negative for dizziness, weakness, light-headedness and headaches.   Hematological: Negative for adenopathy. No excessive bruising.   Psychiatric/Behavioral: Negative for sleep disturbance. The patient is not  "nervous/anxious.       Objective:     Physical Exam   Constitutional: He is oriented to person, place, and time. Vital signs are normal. He appears well-developed and well-nourished. He is active and cooperative.  Non-toxic appearance. He does not appear ill. No distress.   HENT:   Head: Normocephalic and atraumatic.   Neck circumference is 50 cm. (19 1/2 inches).  Mallampati score 3   Eyes: Conjunctivae are normal.   Neck: Normal range of motion. Neck supple.   Cardiovascular: Normal rate, regular rhythm, normal heart sounds and intact distal pulses.    Pulmonary/Chest: Effort normal and breath sounds normal.   Abdominal: Soft. Bowel sounds are normal.   Musculoskeletal: Normal range of motion. He exhibits edema. Tenderness: trace.   Neurological: He is alert and oriented to person, place, and time. He has normal reflexes.   Skin: Skin is warm and dry.   Psychiatric: He has a normal mood and affect. His behavior is normal. Judgment and thought content normal.   Vitals reviewed.    Vitals:    07/05/17 1514   BP: 110/70   Pulse: 78   Resp: (!) 24   SpO2: (!) 93%   Weight: (!) 146.4 kg (322 lb 12.1 oz)   Height: 5' 9.6" (1.768 m)     Body mass index is 46.84 kg/m².    Personal Diagnostic Review  Lab reviewed    Assessment:     1. Sleep apnea, unspecified type    2. Morbid obesity with BMI of 45.0-49.9, adult      Orders Placed This Encounter   Procedures    Home Sleep Studies     Standing Status:   Future     Standing Expiration Date:   7/5/2018     Plan:     Discussed diagnosis, its evaluation, treatment and usual course. All questions answered.    Problem List Items Addressed This Visit     Morbid obesity with BMI of 45.0-49.9, adult     Encouraged calorie reduction and 30 minutes of exercise daily. (pt is awaiting cardiac clearance appointment scheduled for 7/6/2017). He has begun calorie restriction and reduced portions about 10 days ago. Has lost 6 lbs.     Discussed impact of obesity on general health and sleep " apnea               Other Visit Diagnoses     Sleep apnea, unspecified type    -  Primary    witnessed apnea/gasping, loud snoring.     Relevant Orders    Home Sleep Studies           Return for Sleep Study Follow Up.

## 2024-03-29 NOTE — TELEPHONE ENCOUNTER
Medication PA Requested: VICTOZA 18 MG/3ML Subcutaneous Solution Pen                                                           CoverMyMeds Used: No  Key:  Quantity: 27 mL   Day Supply: 90  Sig: Inject 1.8 mg into the skin daily   DX Code:       E11.69                                      EPA submitted, LOV 1/22 and A1C 1/22  Awaiting determination

## 2024-04-01 NOTE — TELEPHONE ENCOUNTER
Received fax from [facility] in regards to Victoza 18MG/3ML. Medication has been approved from 01/01/2024 to 03/29/2025. sendwithust message sent to pt and approval letter sent to scanning.    Approval # - [id number]

## 2024-04-28 ENCOUNTER — HOSPITAL ENCOUNTER (EMERGENCY)
Facility: HOSPITAL | Age: 59
Discharge: HOME | End: 2024-04-28
Payer: MEDICARE

## 2024-04-28 VITALS
TEMPERATURE: 98 F | SYSTOLIC BLOOD PRESSURE: 150 MMHG | WEIGHT: 196 LBS | DIASTOLIC BLOOD PRESSURE: 83 MMHG | BODY MASS INDEX: 32.65 KG/M2 | HEART RATE: 80 BPM | HEIGHT: 65 IN | OXYGEN SATURATION: 96 % | RESPIRATION RATE: 18 BRPM

## 2024-04-28 DIAGNOSIS — R21 RASH: Primary | ICD-10-CM

## 2024-04-28 PROCEDURE — 99283 EMERGENCY DEPT VISIT LOW MDM: CPT

## 2024-04-28 PROCEDURE — 99284 EMERGENCY DEPT VISIT MOD MDM: CPT

## 2024-04-28 RX ORDER — HYDROXYZINE HYDROCHLORIDE 25 MG/1
TABLET, FILM COATED ORAL EVERY 4 HOURS PRN
Qty: 20 TABLET | Refills: 0 | Status: SHIPPED | OUTPATIENT
Start: 2024-04-28 | End: 2024-05-28

## 2024-04-28 RX ORDER — PREDNISONE 20 MG/1
20 TABLET ORAL DAILY
Qty: 3 TABLET | Refills: 0 | Status: SHIPPED | OUTPATIENT
Start: 2024-04-28 | End: 2024-05-01

## 2024-04-28 RX ORDER — HYDROXYZINE HYDROCHLORIDE 25 MG/1
25 TABLET, FILM COATED ORAL ONCE
Status: COMPLETED | OUTPATIENT
Start: 2024-04-28 | End: 2024-04-28

## 2024-04-28 RX ORDER — PREDNISONE 20 MG/1
60 TABLET ORAL ONCE
Status: COMPLETED | OUTPATIENT
Start: 2024-04-28 | End: 2024-04-28

## 2024-04-28 RX ORDER — FAMOTIDINE 20 MG/1
20 TABLET, FILM COATED ORAL ONCE
Status: COMPLETED | OUTPATIENT
Start: 2024-04-28 | End: 2024-04-28

## 2024-04-28 NOTE — ED INITIAL ASSESSMENT (HPI)
Pt arrives through triage with complaints of facial \"bumps\" that appeared 8 days ago. Pt has been taking zyrtec w/o relief. Pt denies new products. Pt states bumps are only on her face

## 2024-04-28 NOTE — ED PROVIDER NOTES
Patient Seen in: [facility] Emergency Department      History     Chief Complaint   Patient presents with    Derm Problem     Stated Complaint: rash    Subjective:   60yo/f w hx of asthma, DM, VTE reports w generalized, pruritic facial rash x 8 days. Not resolved w zyrtec. No fever. No trauma. No trouble breathing, speaking, swallowing. No known new food, soaps, lotions. No recent travel, abx use, sick contacts. No trauma. No rash outside of face.             Objective:   Past Medical History:    Asthma (HCC)    Diabetes (HCC)    History of blood clots              Past Surgical History:   Procedure Laterality Date    Colonoscopy                  Social History     Socioeconomic History    Marital status:     Number of children: 5   Occupational History    Occupation: unemployed   Tobacco Use    Smoking status: Never     Passive exposure: Never    Smokeless tobacco: Never   Vaping Use    Vaping status: Never Used   Substance and Sexual Activity    Alcohol use: Not Currently    Drug use: Never    Sexual activity: Yes     Partners: Male   Other Topics Concern    Right Handed Yes    Caffeine Concern No    Exercise Yes    Seat Belt Yes    Special Diet No    Stress Concern No    Weight Concern No              Review of Systems   All other systems reviewed and are negative.      Positive for stated complaint: rash  Other systems are as noted in HPI.  Constitutional and vital signs reviewed.      All other systems reviewed and negative except as noted above.    Physical Exam     ED Triage Vitals [04/28/24 1814]   /83   Pulse 80   Resp 18   Temp 98.2 °F (36.8 °C)   Temp src Temporal   SpO2 96 %   O2 Device None (Room air)       Current:/83   Pulse 80   Temp 98.2 °F (36.8 °C) (Temporal)   Resp 18   Ht 165.1 cm (5' 5\")   Wt 88.9 kg   SpO2 96%   BMI 32.62 kg/m²         Physical Exam  Vitals and nursing note reviewed.   Constitutional:       General: She is not in acute distress.      Appearance: She is well-developed.   HENT:      Head: Normocephalic and atraumatic.      Nose: Nose normal.      Mouth/Throat:      Mouth: Mucous membranes are moist.   Eyes:      Conjunctiva/sclera: Conjunctivae normal.      Pupils: Pupils are equal, round, and reactive to light.   Cardiovascular:      Rate and Rhythm: Normal rate and regular rhythm.      Heart sounds: Normal heart sounds.   Pulmonary:      Effort: Pulmonary effort is normal.      Breath sounds: Normal breath sounds.   Abdominal:      General: Bowel sounds are normal.      Palpations: Abdomen is soft.   Musculoskeletal:         General: No tenderness or deformity. Normal range of motion.      Cervical back: Normal range of motion and neck supple.   Skin:     General: Skin is warm and dry.      Capillary Refill: Capillary refill takes less than 2 seconds.      Findings: Rash present.      Comments: Scattered, urticarial, macular rash, non tender, non erythematous, no drainage   Neurological:      General: No focal deficit present.      Mental Status: She is alert and oriented to person, place, and time.      GCS: GCS eye subscore is 4. GCS verbal subscore is 5. GCS motor subscore is 6.      Cranial Nerves: No cranial nerve deficit.      Gait: Gait normal.               ED Course   Labs Reviewed - No data to display              MDM                       Medical Decision Making  60yo/f w hx and exam as stated, bumps    Pruritic  Urticarial  Atraumatic  Spares everywhere but face  Not rapidly spreading  Does not appear cellulitic    Plan  Pred  Atarax  Pepcid    F/u derm       Risk  OTC drugs.  Prescription drug management.        Disposition and Plan     Clinical Impression:  1. Rash         Disposition:  Discharge  4/28/2024  6:35 pm    Follow-up:  Julito Wise MD  [address] [facility]  [address] [place] [address] [phone]    Follow up in 2 day(s)      We recommend that you schedule follow up care with a primary care provider within the next  three months to obtain basic health screening including reassessment of your blood pressure.      Medications Prescribed:  Current Discharge Medication List        START taking these medications    Details   hydrOXYzine 25 MG Oral Tab Take 1-2 tablets (25-50 mg total) by mouth every 4 (four) hours as needed for Itching.  Qty: 20 tablet, Refills: 0      predniSONE 20 MG Oral Tab Take 1 tablet (20 mg total) by mouth daily for 3 days.  Qty: 3 tablet, Refills: 0

## 2024-04-30 ENCOUNTER — TELEPHONE (OUTPATIENT)
Dept: FAMILY MEDICINE CLINIC | Facility: CLINIC | Age: 59
End: 2024-04-30

## 2024-04-30 NOTE — TELEPHONE ENCOUNTER
Message left for daughter to find out from insurance where the order for compression stockings can be sent for best coverage.

## 2024-05-03 NOTE — TELEPHONE ENCOUNTER
Patient's daughter Pauline called (on FUAD), verified patient's Name and . [employer] is [place] in [place] ([address] Phone [phone]).

## 2024-05-16 ENCOUNTER — OFFICE VISIT (OUTPATIENT)
Dept: FAMILY MEDICINE CLINIC | Facility: CLINIC | Age: 59
End: 2024-05-16

## 2024-05-16 VITALS
BODY MASS INDEX: 33.66 KG/M2 | OXYGEN SATURATION: 98 % | HEIGHT: 65 IN | TEMPERATURE: 99 F | SYSTOLIC BLOOD PRESSURE: 128 MMHG | HEART RATE: 80 BPM | WEIGHT: 202 LBS | DIASTOLIC BLOOD PRESSURE: 78 MMHG | RESPIRATION RATE: 20 BRPM

## 2024-05-16 DIAGNOSIS — R21 RASH AND NONSPECIFIC SKIN ERUPTION: Primary | ICD-10-CM

## 2024-05-16 PROCEDURE — 99214 OFFICE O/P EST MOD 30 MIN: CPT | Performed by: FAMILY MEDICINE

## 2024-05-16 PROCEDURE — 3078F DIAST BP <80 MM HG: CPT | Performed by: FAMILY MEDICINE

## 2024-05-16 PROCEDURE — 3008F BODY MASS INDEX DOCD: CPT | Performed by: FAMILY MEDICINE

## 2024-05-16 PROCEDURE — 3074F SYST BP LT 130 MM HG: CPT | Performed by: FAMILY MEDICINE

## 2024-05-16 RX ORDER — LISINOPRIL 5 MG/1
5 TABLET ORAL DAILY
Qty: 90 TABLET | Refills: 0 | Status: SHIPPED | OUTPATIENT
Start: 2024-05-16

## 2024-05-16 RX ORDER — MOMETASONE FUROATE 1 MG/G
CREAM TOPICAL
Qty: 45 G | Refills: 0 | Status: SHIPPED | OUTPATIENT
Start: 2024-05-16

## 2024-05-16 RX ORDER — LISINOPRIL 5 MG/1
5 TABLET ORAL DAILY
Qty: 90 TABLET | Refills: 3 | Status: CANCELLED | OUTPATIENT
Start: 2024-05-16

## 2024-05-16 NOTE — PROGRESS NOTES
Subjective:   Patient ID: Young Abarca is a 59 year old female.    Pt presents for follow up from the ER for rash of face and chest area as per family member who translated. Patient was treated with atarax but patient states symptoms are not much. better.   No new topical agents or ingestions. No fevers or acute illness.   Pt also needs refill on her lisinopril.         History/Other:   Review of Systems   Constitutional:  Negative for fever.   Skin:  Positive for rash.     Current Outpatient Medications   Medication Sig Dispense Refill    lisinopril 5 MG Oral Tab Take 1 tablet (5 mg total) by mouth daily. 90 tablet 0    Mometasone Furoate 0.1 % External Cream Apply a small dab to affected area of skin once per day as needed. 45 g 0    metFORMIN HCl 1000 MG Oral Tab TK 1 T PO  tablet 1    cholecalciferol 50 MCG (2000 UT) Oral Tab Take 1 tablet (2,000 Units total) by mouth daily.      rosuvastatin 20 MG Oral Tab Take 1 tablet (20 mg total) by mouth daily. 90 tablet 1    VICTOZA 18 MG/3ML Subcutaneous Solution Pen-injector Inject 1.8 mg into the skin daily. 27 mL 0    hydrOXYzine 25 MG Oral Tab Take 1-2 tablets (25-50 mg total) by mouth every 4 (four) hours as needed for Itching. (Patient not taking: Reported on 5/16/2024) 20 tablet 0    Glucose Blood (ONETOUCH VERIO) In Vitro Strip Check sugars once a day 100 strip 1    Insulin Pen Needle (TRUEPLUS 5-BEVEL PEN NEEDLES) 32G X 4 MM Does not apply Misc 1 strip by In Vitro route daily. 100 each 0    Elastic Bandages & Supports (MEDICAL COMPRESSION STOCKINGS) Does not apply Misc 1 each daily. (Patient not taking: Reported on 2/29/2024) 2 each 3    albuterol 108 (90 Base) MCG/ACT Inhalation Aero Soln Inhale 2 puffs into the lungs every 6 (six) hours as needed for Wheezing. (Patient not taking: Reported on 2/29/2024) 3 each 1    Glucose Blood (ONETOUCH ULTRA) In Vitro Strip Use to check blood sugar levels 2x daily 200 each 0    montelukast 10 MG Oral Tab Take 1  tablet (10 mg total) by mouth daily. (Patient not taking: Reported on 2/29/2024)      Glucose Blood (ONETOUCH VERIO) In Vitro Strip TEST ONCE DAILY 100 strip 0    ONETOUCH DELICA PLUS [id number] Does not apply Misc TEST ONCE DAILY 100 each 0    cyclobenzaprine 10 MG Oral Tab Take 1 tablet (10 mg total) by mouth 3 (three) times daily as needed for Muscle spasms. (Patient not taking: Reported on 2/29/2024) 14 tablet 0    omeprazole 20 MG Oral Capsule Delayed Release Take 1 capsule (20 mg total) by mouth every morning before breakfast. (Patient not taking: Reported on 2/29/2024) 30 capsule 2    Blood Glucose Monitoring Suppl (ONETOUCH VERIO) w/Device Does not apply Kit 1 Device 2 (two) times a day. 1 kit 0    albuterol (2.5 MG/3ML) 0.083% Inhalation Nebu Soln Take 3 mL (2.5 mg total) by nebulization every 6 (six) hours as needed for Wheezing. (Patient not taking: Reported on 2/29/2024) 75 each 0    Elastic Bandages & Supports (MEDICAL COMPRESSION STOCKINGS) Does not apply Misc 2 each daily. Wear daily on Lower extremities. (Patient not taking: Reported on 2/29/2024) 2 each 1    Elastic Bandages & Supports (MEDICAL COMPRESSION STOCKINGS) Does not apply Misc 1 Application by Does not apply route daily. 2 each 5     Allergies:  Allergies   Allergen Reactions    Fish-Derived Products HIVES and WHEEZING    Shellfish-Derived Products HIVES and WHEEZING       Objective:   Physical Exam  Constitutional:       Appearance: Normal appearance.   Skin:     Findings: Rash present.      Comments: Rash of face and chest area. No drainage or tenderness   Neurological:      Mental Status: She is alert.   Psychiatric:         Mood and Affect: Mood normal.         Behavior: Behavior normal.         Assessment & Plan:   1. Rash and nonspecific skin eruption : reviewed ER note:  - After discussion with patient, eloconcream as directed; Discussed over the counter treatments. To call if worse or not better; Follow up in 1-2 weeks if rash not  resolved. Consider follow up with dermatology if not resolved. Also renewed lisinopril as requested.         No orders of the defined types were placed in this encounter.      Meds This Visit:  Requested Prescriptions     Signed Prescriptions Disp Refills    lisinopril 5 MG Oral Tab 90 tablet 0     Sig: Take 1 tablet (5 mg total) by mouth daily.    Mometasone Furoate 0.1 % External Cream 45 g 0     Sig: Apply a small dab to affected area of skin once per day as needed.       Imaging & Referrals:  [facility] - INTERNAL

## 2024-06-10 NOTE — TELEPHONE ENCOUNTER
Patient's daughter Afsaneh calling to state mother needs onetouch ultra test strips, but got onetouch verio instead. Asking to send OneTouch Ultra.       Current Outpatient Medications   Medication Sig Dispense Refill    Glucose Blood (ONETOUCH ULTRA) In Vitro Strip Use to check blood sugar levels 2x daily 200 each 0

## 2024-06-14 RX ORDER — BLOOD SUGAR DIAGNOSTIC
STRIP MISCELLANEOUS
Qty: 100 EACH | Refills: 3 | Status: SHIPPED | OUTPATIENT
Start: 2024-06-14

## 2024-06-14 NOTE — TELEPHONE ENCOUNTER
REFILL PASSED PER [facility] PROTOCOLS    Requested Prescriptions   Pending Prescriptions Disp Refills    Glucose Blood (ONETOUCH ULTRA) In Vitro Strip 100 each 3     Sig: Check glucose once daily       Diabetic Supplies Protocol Passed - 6/10/2024  4:19 PM        Passed - In person appointment or virtual visit in the past 12 mos or appointment in next 3 mos     Recent Outpatient Visits              4 weeks ago Rash and nonspecific skin eruption    [facility]    3 months ago Environmental and seasonal allergies    [facility]    Nurse Only    3 months ago Adverse effect of drug, [facility]    4 months ago [facility]ergy, [facility]    4 months ago Dyslipidemia    [facility] Angelita Corbin MD    Office Visit          Future Appointments         Provider Department Appt Notes    In 4 days Angelita Corbin MD [facility]                          Future Appointments         Provider Department Appt Notes    In 4 days Angelita Corbin MD [facility]           Recent Outpatient Visits              4 weeks ago [facility] and nonspecific skin eruption    [facility]    3 months ago Environmental and seasonal allergies    [facility]    Nurse Only    3 months ago Adverse effect of drug, initial encounter    [facility]    4 months ago Allergy, initial encounter     [facility], [facility], Shaina Nugent MD    Office Visit    4 months ago Dyslipidemia    [facility], [facility], Angelita Greenwood MD    Office Visit

## 2024-06-15 ENCOUNTER — LAB ENCOUNTER (OUTPATIENT)
Dept: LAB | Facility: HOSPITAL | Age: 59
End: 2024-06-15
Attending: INTERNAL MEDICINE

## 2024-06-15 DIAGNOSIS — E11.69 TYPE 2 DIABETES MELLITUS WITH OTHER SPECIFIED COMPLICATION, WITHOUT LONG-TERM CURRENT USE OF INSULIN (HCC): ICD-10-CM

## 2024-06-15 DIAGNOSIS — E78.5 DYSLIPIDEMIA: ICD-10-CM

## 2024-06-15 LAB
ALBUMIN SERPL-MCNC: 4.3 G/DL (ref 3.2–4.8)
ALBUMIN/GLOB SERPL: 1.4 {RATIO} (ref 1–2)
ALP LIVER SERPL-CCNC: 69 U/L
ALT SERPL-CCNC: 22 U/L
ANION GAP SERPL CALC-SCNC: 8 MMOL/L (ref 0–18)
AST SERPL-CCNC: 15 U/L (ref ?–34)
BILIRUB SERPL-MCNC: 0.8 MG/DL (ref 0.2–1.1)
BUN BLD-MCNC: 15 MG/DL (ref 9–23)
BUN/CREAT SERPL: 19.2 (ref 10–20)
CALCIUM BLD-MCNC: 9.6 MG/DL (ref 8.7–10.4)
CHLORIDE SERPL-SCNC: 110 MMOL/L (ref 98–112)
CHOLEST SERPL-MCNC: 122 MG/DL (ref ?–200)
CO2 SERPL-SCNC: 26 MMOL/L (ref 21–32)
CREAT BLD-MCNC: 0.78 MG/DL
CREAT UR-SCNC: 176.3 MG/DL
EGFRCR SERPLBLD CKD-EPI 2021: 87 ML/MIN/1.73M2 (ref 60–?)
FASTING PATIENT LIPID ANSWER: YES
FASTING STATUS PATIENT QL REPORTED: YES
GLOBULIN PLAS-MCNC: 3 G/DL (ref 2–3.5)
GLUCOSE BLD-MCNC: 140 MG/DL (ref 70–99)
HDLC SERPL-MCNC: 51 MG/DL (ref 40–59)
LDLC SERPL CALC-MCNC: 57 MG/DL (ref ?–100)
MICROALBUMIN UR-MCNC: 0.8 MG/DL
MICROALBUMIN/CREAT 24H UR-RTO: 4.5 UG/MG (ref ?–30)
NONHDLC SERPL-MCNC: 71 MG/DL (ref ?–130)
OSMOLALITY SERPL CALC.SUM OF ELEC: 301 MOSM/KG (ref 275–295)
POTASSIUM SERPL-SCNC: 4.3 MMOL/L (ref 3.5–5.1)
PROT SERPL-MCNC: 7.3 G/DL (ref 5.7–8.2)
SODIUM SERPL-SCNC: 144 MMOL/L (ref 136–145)
TRIGL SERPL-MCNC: 66 MG/DL (ref 30–149)
VLDLC SERPL CALC-MCNC: 10 MG/DL (ref 0–30)

## 2024-06-15 PROCEDURE — 36415 COLL VENOUS BLD VENIPUNCTURE: CPT

## 2024-06-15 PROCEDURE — 82043 UR ALBUMIN QUANTITATIVE: CPT

## 2024-06-15 PROCEDURE — 82570 ASSAY OF URINE CREATININE: CPT

## 2024-06-15 PROCEDURE — 80061 LIPID PANEL: CPT

## 2024-06-15 PROCEDURE — 80053 COMPREHEN METABOLIC PANEL: CPT

## 2024-06-18 ENCOUNTER — OFFICE VISIT (OUTPATIENT)
Dept: ENDOCRINOLOGY CLINIC | Facility: CLINIC | Age: 59
End: 2024-06-18

## 2024-06-18 VITALS
DIASTOLIC BLOOD PRESSURE: 80 MMHG | SYSTOLIC BLOOD PRESSURE: 133 MMHG | BODY MASS INDEX: 34 KG/M2 | HEART RATE: 81 BPM | WEIGHT: 202 LBS

## 2024-06-18 DIAGNOSIS — E11.65 TYPE 2 DIABETES MELLITUS WITH HYPERGLYCEMIA, UNSPECIFIED WHETHER LONG TERM INSULIN USE (HCC): Primary | ICD-10-CM

## 2024-06-18 DIAGNOSIS — E78.5 DYSLIPIDEMIA: ICD-10-CM

## 2024-06-18 LAB
GLUCOSE BLOOD: 135
HEMOGLOBIN A1C: 6.6 % (ref 4.3–5.6)
TEST STRIP LOT #: NORMAL NUMERIC

## 2024-06-18 PROCEDURE — 3061F NEG MICROALBUMINURIA REV: CPT | Performed by: INTERNAL MEDICINE

## 2024-06-18 PROCEDURE — 3044F HG A1C LEVEL LT 7.0%: CPT | Performed by: INTERNAL MEDICINE

## 2024-06-18 PROCEDURE — 82947 ASSAY GLUCOSE BLOOD QUANT: CPT | Performed by: INTERNAL MEDICINE

## 2024-06-18 PROCEDURE — 3075F SYST BP GE 130 - 139MM HG: CPT | Performed by: INTERNAL MEDICINE

## 2024-06-18 PROCEDURE — 3079F DIAST BP 80-89 MM HG: CPT | Performed by: INTERNAL MEDICINE

## 2024-06-18 PROCEDURE — 99213 OFFICE O/P EST LOW 20 MIN: CPT | Performed by: INTERNAL MEDICINE

## 2024-06-18 PROCEDURE — 83036 HEMOGLOBIN GLYCOSYLATED A1C: CPT | Performed by: INTERNAL MEDICINE

## 2024-06-18 RX ORDER — ROSUVASTATIN CALCIUM 20 MG/1
20 TABLET, COATED ORAL DAILY
Qty: 90 TABLET | Refills: 1 | Status: SHIPPED | OUTPATIENT
Start: 2024-06-18

## 2024-06-18 RX ORDER — LIRAGLUTIDE 6 MG/ML
1.8 INJECTION SUBCUTANEOUS DAILY
Qty: 27 ML | Refills: 0 | Status: SHIPPED | OUTPATIENT
Start: 2024-06-18

## 2024-06-18 RX ORDER — PEN NEEDLE, DIABETIC 32GX 5/32"
1 NEEDLE, DISPOSABLE MISCELLANEOUS DAILY
Qty: 100 EACH | Refills: 0 | Status: SHIPPED | OUTPATIENT
Start: 2024-06-18 | End: 2024-06-18

## 2024-06-18 RX ORDER — PEN NEEDLE, DIABETIC 32GX 5/32"
1 NEEDLE, DISPOSABLE MISCELLANEOUS DAILY
Qty: 100 EACH | Refills: 1 | Status: SHIPPED | OUTPATIENT
Start: 2024-06-18

## 2024-06-18 NOTE — PROGRESS NOTES
HISTORY OF PRESENT ILLNESS   Young Abarca is a 59 year old female who presents for diabetes.    Other Significant medical hx includes: DM, hyperlipidemia     Diagnosed with diabetes around age 52     Family hx of diabetes denies     Dietary compliance: moderate  Exercise: No.   Polyuria/polydipsia: No  Blurred vision: No    Episodes of hypoglycemia: No  Blood Glucose:  Glucometer:  Checking 1 times a day  Fasting     No low BG under 70    Medications for DM  Metformin 1,000 mg po BID  Victoza 1.8 mg daily      T/f:   Glipizide 2.5 mg with dinner only --> stopped due to low BG    Avoid SGLT2 with LE infection/ wound     Stopped amaryl due to low BG   Did not tolerate trulicity well     REVIEW OF SYSTEMS  Eyes: Diabetic retinopathy present: No            Most recent visit to eye doctor in last 12 months: Sep 2023    Oral/ Dental Care : Recent visit to dentist in last 6-12 months no     CV: Cardiovascular disease present: No         Hypertension present: Yes         Hyperlipidemia present: Yes         Peripheral Vascular Disease present: no         Heart Failure: No    : Nephropathy present: No    Neuro: Neuropathy present: yes     Skin: Infection or ulceration: No            Follows with Podiatry: yes    Feet: Denies hx of  amputation, Charcot foot, angioplasty or vascular surgery,  Positive hx of  neuropathy (pain, burning, numbness) stable     Osteoporosis: No    Thyroid disease: No    Psychiatric:    Little interest or pleasure in doing things:No   Feeling down, depressed or hopeless : No        Medications:     Current Outpatient Medications:     VICTOZA 18 MG/3ML Subcutaneous Solution Pen-injector, Inject 1.8 mg into the skin daily., Disp: 27 mL, Rfl: 0    rosuvastatin 20 MG Oral Tab, Take 1 tablet (20 mg total) by mouth daily., Disp: 90 tablet, Rfl: 1    metFORMIN HCl 1000 MG Oral Tab, Take 1 tablet (1,000 mg total) by mouth 2 (two) times daily with meals. TK 1 T PO BID, Disp: 180 tablet, Rfl: 1     Insulin Pen Needle (TRUEPLUS 5-BEVEL PEN NEEDLES) 32G X 4 MM Does not apply Misc, 1 strip by In Vitro route daily., Disp: 100 each, Rfl: 1    Glucose Blood (ONETOUCH ULTRA) In Vitro Strip, Check glucose once daily, Disp: 100 each, Rfl: 3    lisinopril 5 MG Oral Tab, Take 1 tablet (5 mg total) by mouth daily., Disp: 90 tablet, Rfl: 0    Mometasone Furoate 0.1 % External Cream, Apply a small dab to affected area of skin once per day as needed., Disp: 45 g, Rfl: 0    cholecalciferol 50 MCG (2000 UT) Oral Tab, Take 1 tablet (2,000 Units total) by mouth daily., Disp: , Rfl:     Glucose Blood (ONETOUCH VERIO) In Vitro Strip, Check sugars once a day, Disp: 100 strip, Rfl: 1    Glucose Blood (ONETOUCH ULTRA) In Vitro Strip, Use to check blood sugar levels 2x daily, Disp: 200 each, Rfl: 0    Glucose Blood (ONETOUCH VERIO) In Vitro Strip, TEST ONCE DAILY, Disp: 100 strip, Rfl: 0    ONETOUCH DELICA PLUS [id number] Does not apply Misc, TEST ONCE DAILY, Disp: 100 each, Rfl: 0    Blood Glucose Monitoring Suppl (ONETOUCH VERIO) w/Device Does not apply Kit, 1 Device 2 (two) times a day., Disp: 1 kit, Rfl: 0    Elastic Bandages & Supports (MEDICAL COMPRESSION STOCKINGS) Does not apply Misc, 1 Application by Does not apply route daily., Disp: 2 each, Rfl: 5     Allergies:   Allergies   Allergen Reactions    Fish-Derived Products HIVES and WHEEZING    Shellfish-Derived Products HIVES and WHEEZING       Social History:   Social History     Socioeconomic History    Marital status:     Number of children: 5   Occupational History    Occupation: unemployed   Tobacco Use    Smoking status: Never     Passive exposure: Never    Smokeless tobacco: Never   Vaping Use    Vaping status: Never Used   Substance and Sexual Activity    Alcohol use: Not Currently    Drug use: Never    Sexual activity: Yes     Partners: Male   Other Topics Concern    Right Handed Yes    Caffeine Concern No    Exercise Yes    Seat Belt Yes    Special Diet No     Stress Concern No    Weight Concern No       Medical History:   Past Medical History:    Asthma (HCC)    Diabetes (HCC)    History of blood clots       Surgical history:   Past Surgical History:   Procedure Laterality Date    Colonoscopy           PHYSICAL EXAM    Vitals reviewed      General Appearance:  alert, well developed, in no acute distress  Head: Atraumatic  Eyes:  normal conjunctivae, sclera., normal sclera and normal pupils  Throat/Neck: normal sound to voice. Normal hearing, normal speech  Respiratory:  Speaking in full sentences, non-labored. no increased work of breathing, no audible wheezing    Neuro: motor grossly intact, moving all extremities without difficulty  Psychiatric:  oriented to time, self, and place  Extremities:  June 2024  Bilateral barefoot skin diabetic exam is normal, visualized feet and the appearance is normal.  Bilateral monofilament/sensation of both feet is normal.  Pulsation pedal pulse exam of both lower legs/feet is normal as well.        Pertinent data reviewed with the patient     ASSESSMENT/PLAN:    1. Diabetes Mellitus Type 2 :  a1c is 6.6 % ( 6/2024)    Reviewed Diabetes mellitus type 2 is an endocrine disorder with insulin resistance and deficiency, resulting in high blood sugars. Complications can include cardiovascular disease, neuropathy (nerve damage), nephropathy (kidney disease), retinopathy (eye damage)   Discussed importance of glycemic control and patient verbalized understanding of diabetes pathogenesis and glycemic control to prevent the diabetes complications   -Lifestyle/ Diet/ Exercise reviewed the following   -Discussed importance of SBGM  -Low CHO diet, recommend 45gm per meal or 135gm per day  -Low CHO diet and CHO counting  -Checking BG levels at home      Diabetic Medications   Medications for DM    Metformin 1,000 mg po BID  Take with food  GI SE reviewed    Victoza 1.8 mg daily  No personal or family history of MEN syndrome  Patient counselled  regarding side effects including injection site reactions, nausea, vomiting, diarrhea, pancreatitis, gastroparesis and rare side effect evelio Mitchell syndrome.      Reviewed Risk of Hypoglycemia / Recognition & Treatment - rule of 15       -Reviewed Mechanism of Action/ Risk Benefit / Side effects of each medication     -Patient verbalized understanding and is in agreement to plan for [facility] Diabetes management, agrees to notify clinic of changes in medical care or medications.      2. Hyperlipidemia / Lipids   Reviewed goal lipid/ low fat diet  Stain therapy / Taking at night   Discussed the potential side effects of statins including muscle and liver injury.        3. BP is normal today        Call with BG as discussed.       RTC in 5-6  months      Patient verbalized a complete  understanding of all of the above and did not have any further questions.           Orders Placed This Encounter   Procedures    POC HemoCue Glucose 201 (Finger stick glucose)    POC Glycohemoglobin [42552]       Angelita Corbin MD  This visit was conducted with the help of a [place] interpretor over video ID [id number]. Patient verbalized a complete  understanding of all of the above and did not have any further questions.

## 2024-06-28 ENCOUNTER — TELEPHONE (OUTPATIENT)
Dept: ENDOCRINOLOGY CLINIC | Facility: CLINIC | Age: 59
End: 2024-06-28

## 2024-06-28 NOTE — TELEPHONE ENCOUNTER
This medication is on backorder by mfg please consider switching to something else       VICTOZA 18 MG/3ML Subcutaneous Solution Pen-injector, Inject 1.8 mg into the skin daily., Disp: 27 mL, Rfl: 0

## 2024-08-09 ENCOUNTER — TELEPHONE (OUTPATIENT)
Dept: INTERNAL MEDICINE UNIT | Facility: HOSPITAL | Age: 59
End: 2024-08-09

## 2024-08-20 RX ORDER — LISINOPRIL 5 MG/1
5 TABLET ORAL DAILY
Qty: 90 TABLET | Refills: 3 | Status: SHIPPED | OUTPATIENT
Start: 2024-08-20

## 2024-08-21 NOTE — TELEPHONE ENCOUNTER
Refill passed per [facility] protocol.    Requested Prescriptions   Pending Prescriptions Disp Refills    LISINOPRIL 5 MG Oral Tab [Pharmacy Med Name: LISINOPRIL 5MG TABLETS] 90 tablet 0     Sig: TAKE 1 TABLET(5 MG) BY MOUTH DAILY       Hypertension Medications Protocol Passed - 8/17/2024 11:31 AM        Passed - CMP or BMP in past 12 months        Passed - Last BP reading less than 140/90     BP Readings from Last 1 Encounters:   06/18/24 133/80               Passed - In person appointment or virtual visit in the past 12 mos or appointment in next 3 mos     Recent Outpatient Visits              2 months ago Type 2 diabetes mellitus with hyperglycemia, unspecified whether long term insulin use (HCC)    [facility] Angelita Corbin MD    Office Visit    3 months ago Rash and nonspecific skin eruption    [facility] Jean Whitman MD    Office Visit    5 months ago Environmental and seasonal allergies    [facility]    Nurse Only    5 months ago Adverse effect of drug, initial encounter    [facility] Armen Morillo MD    Office Visit    6 months ago Allergy, initial encounter    [facility] Shaina Real MD    Office Visit          Future Appointments         Provider Department Appt Notes    In 3 months Angelita Corbin MD [facility] 6 months                    Passed - EGFRCR or GFRNAA > 50     GFR Evaluation  EGFRCR: 87 , resulted on 6/15/2024             Future Appointments         Provider Department Appt Notes    In 3 months Angelita Corbin MD [facility] 6 months          Recent Outpatient Visits              2 months ago Type 2 diabetes mellitus with hyperglycemia,  unspecified whether long term insulin use (HCC)    [facility] [place] Angelita Corbin MD    Office Visit    3 months ago Rash and nonspecific skin eruption    [facility], [facility] Jean Mata MD    Office Visit    5 months ago Environmental and seasonal allergies    [facility] [place]    Nurse Only    5 months ago Adverse effect of drug, initial encounter    [facility], Armen Santacruz MD    Office Visit    6 months ago Allergy, initial encounter    [facility] [place]    Office Visit

## 2024-08-21 NOTE — TELEPHONE ENCOUNTER
Please review. Protocol Failed; No Protocol    Requested Prescriptions   Pending Prescriptions Disp Refills    CHOLECALCIFEROL 50 MCG (2000 UT) Oral Tab [Pharmacy Med Name: VITAMIN D3 50MCG TABLETS] 90 tablet 0     Sig: TAKE 1 TABLET BY MOUTH DAILY       There is no refill protocol information for this order            Future Appointments         Provider Department Appt Notes    In 3 months Angelita Corbin MD [facility] 6 months          Recent Outpatient Visits              2 months ago Type 2 diabetes mellitus with hyperglycemia, unspecified whether long term insulin use (HCC)    [facility] Angelita Coribn MD    Office Visit    3 months ago Rash and nonspecific skin eruption    [facility] Jean Whitman MD    Office Visit    5 months ago Environmental and seasonal allergies    [facility]    Nurse Only    5 months ago Adverse effect of drug, initial encounter    [facility]    Office Visit    6 months ago Allergy, initial encounter    [facility] Shaina Real MD    Office Visit

## 2024-08-22 RX ORDER — CHOLECALCIFEROL (VITAMIN D3) 50 MCG
2000 TABLET ORAL DAILY
Qty: 90 TABLET | Refills: 0 | Status: SHIPPED | OUTPATIENT
Start: 2024-08-22

## 2024-11-04 ENCOUNTER — OFFICE VISIT (OUTPATIENT)
Dept: DERMATOLOGY CLINIC | Facility: CLINIC | Age: 59
End: 2024-11-04
Payer: MEDICARE

## 2024-11-04 DIAGNOSIS — L70.0 ACNE VULGARIS: Primary | ICD-10-CM

## 2024-11-04 PROCEDURE — 99203 OFFICE O/P NEW LOW 30 MIN: CPT | Performed by: PHYSICIAN ASSISTANT

## 2024-11-04 RX ORDER — TRETINOIN 0.25 MG/G
1 CREAM TOPICAL NIGHTLY
Qty: 30 G | Refills: 3 | Status: SHIPPED | OUTPATIENT
Start: 2024-11-04

## 2024-11-04 RX ORDER — CLINDAMYCIN PHOSPHATE 10 UG/ML
1 LOTION TOPICAL DAILY
Qty: 60 ML | Refills: 2 | Status: SHIPPED | OUTPATIENT
Start: 2024-11-04

## 2024-11-04 NOTE — PROGRESS NOTES
HPI:    Patient ID: Young Abarca is a 59 year old female.    Patient presents with red spots on face cheeks/forehead that started 3 weeks ago. Itching present.Spots flare during the day but are okay in the morning and night.  She was given mometasone for 1 month which removed the spots. Denies hx of eczema or psoriasis/acne. Patient denies personal and family hx of skin cancer.         Review of Systems   Constitutional:  Negative for chills and fever.   Musculoskeletal:  Negative for arthralgias and myalgias.   Skin:  Positive for rash. Negative for color change and wound.            Current Outpatient Medications   Medication Sig Dispense Refill    clindamycin 1 % External Lotion Apply 1 Application topically daily. 60 mL 2    tretinoin 0.025 % External Cream Apply 1 Application topically nightly. Use as tolerated 30 g 3    cholecalciferol 50 MCG (2000 UT) Oral Tab Take 1 tablet (2,000 Units total) by mouth daily. 90 tablet 0    lisinopril 5 MG Oral Tab Take 1 tablet (5 mg total) by mouth daily. 90 tablet 3    VICTOZA 18 MG/3ML Subcutaneous Solution Pen-injector Inject 1.8 mg into the skin daily. 27 mL 0    rosuvastatin 20 MG Oral Tab Take 1 tablet (20 mg total) by mouth daily. 90 tablet 1    metFORMIN HCl 1000 MG Oral Tab Take 1 tablet (1,000 mg total) by mouth 2 (two) times daily with meals. TK 1 T PO  tablet 1    Insulin Pen Needle (TRUEPLUS 5-BEVEL PEN NEEDLES) 32G X 4 MM Does not apply Misc 1 strip by In Vitro route daily. 100 each 1    Glucose Blood (ONETOUCH ULTRA) In Vitro Strip Check glucose once daily 100 each 3    Glucose Blood (ONETOUCH VERIO) In Vitro Strip Check sugars once a day 100 strip 1    Glucose Blood (ONETOUCH ULTRA) In Vitro Strip Use to check blood sugar levels 2x daily 200 each 0    Glucose Blood (ONETOUCH VERIO) In Vitro Strip TEST ONCE DAILY 100 strip 0    ONETOUCH DELICA PLUS [id number] Does not apply Misc TEST ONCE DAILY 100 each 0    Blood Glucose Monitoring Suppl (ONETOUCH  VERIO) w/Device Does not apply Kit 1 Device 2 (two) times a day. 1 kit 0    Elastic Bandages & Supports (MEDICAL COMPRESSION STOCKINGS) Does not apply Misc 1 Application by Does not apply route daily. 2 each 5    Mometasone Furoate 0.1 % External Cream Apply a small dab to affected area of skin once per day as needed. (Patient not taking: Reported on 11/4/2024) 45 g 0     Allergies:Allergies[1]   There were no vitals taken for this visit.  There is no height or weight on file to calculate BMI.  PHYSICAL EXAM:   Physical Exam  Constitutional:       General: She is not in acute distress.     Appearance: Normal appearance.   Skin:     General: Skin is warm and dry.      Findings: Rash present.      Comments: Papules noted on the cheeks. No draining or tendenress noted. No pustules noted. Smaller cystic lesions noted as well.    Neurological:      Mental Status: She is alert and oriented to person, place, and time.                ASSESSMENT/PLAN:   1. Acne vulgaris  -After discussion with patient, advised the following:  -Started Clindamycin   -Educated to apply daily in the morning.   -Started tretinoin as well  -Educated to apply 2-3 times per week at night only  -Can titrate up to nightly if not redness, irritation or dryness noted  -Shoulder moisturize daily  -Should cleanse daily and after sports.   -To return in 3 months for follow-up to monitor progress and toleration.   -To call or follow-up with worsening symptoms or concerns.   -Pt was agreeable to plan and will comply with discussion above.         No orders of the defined types were placed in this encounter.      Meds This Visit:  Requested Prescriptions     Signed Prescriptions Disp Refills    clindamycin 1 % External Lotion 60 mL 2     Sig: Apply 1 Application topically daily.    tretinoin 0.025 % External Cream 30 g 3     Sig: Apply 1 Application topically nightly. Use as tolerated       Imaging & Referrals:  None         ID#[id number]       [1]    Allergies  Allergen Reactions    Fish-Derived Products HIVES and WHEEZING    Shellfish-Derived Products HIVES and WHEEZING

## 2024-11-04 NOTE — PATIENT INSTRUCTIONS
Morning Routine:    Cleanser  Clindamycin  Moisturizer  Sun screen      Evening Routine:    Cleanser  Moisturizer  Retinoid --> use a pea sized dot for the entire face 2-3 times per week. Can move slowly up to every night.   Moisturizer

## 2024-11-05 ENCOUNTER — OFFICE VISIT (OUTPATIENT)
Dept: FAMILY MEDICINE CLINIC | Facility: CLINIC | Age: 59
End: 2024-11-05
Payer: MEDICARE

## 2024-11-05 VITALS
SYSTOLIC BLOOD PRESSURE: 105 MMHG | HEART RATE: 77 BPM | OXYGEN SATURATION: 96 % | RESPIRATION RATE: 20 BRPM | HEIGHT: 65 IN | TEMPERATURE: 98 F | DIASTOLIC BLOOD PRESSURE: 71 MMHG | WEIGHT: 198 LBS | BODY MASS INDEX: 32.99 KG/M2

## 2024-11-05 DIAGNOSIS — Z12.31 SCREENING MAMMOGRAM FOR BREAST CANCER: ICD-10-CM

## 2024-11-05 DIAGNOSIS — Z12.39 ENCOUNTER FOR SCREENING BREAST EXAMINATION: Primary | ICD-10-CM

## 2024-11-05 PROCEDURE — 99213 OFFICE O/P EST LOW 20 MIN: CPT | Performed by: FAMILY MEDICINE

## 2024-11-05 PROCEDURE — 3008F BODY MASS INDEX DOCD: CPT | Performed by: FAMILY MEDICINE

## 2024-11-05 PROCEDURE — 3078F DIAST BP <80 MM HG: CPT | Performed by: FAMILY MEDICINE

## 2024-11-05 PROCEDURE — 3074F SYST BP LT 130 MM HG: CPT | Performed by: FAMILY MEDICINE

## 2024-11-05 PROCEDURE — G0009 ADMIN PNEUMOCOCCAL VACCINE: HCPCS | Performed by: FAMILY MEDICINE

## 2024-11-05 PROCEDURE — 90677 PCV20 VACCINE IM: CPT | Performed by: FAMILY MEDICINE

## 2024-11-05 RX ORDER — MELOXICAM 7.5 MG/1
TABLET ORAL
Qty: 30 TABLET | Refills: 0 | Status: SHIPPED | OUTPATIENT
Start: 2024-11-05

## 2024-11-05 NOTE — PROGRESS NOTES
Subjective:   Patient ID: Young Abarca is a 59 year old female.    HPI  Patient here for knee pain   Had pain until yesterday   Now no more pain   Just hears clicking   No instability in the knee either   No injury   History/Other:   Review of Systems    Constitutional: Negative.  Negative for activity change, appetite change, diaphoresis and fatigue.     Respiratory: Negative.  Negative for apnea, cough, chest tightness and shortness of breath.    Cardiovascular: Negative.  Negative for chest pain, palpitations and leg swelling.   Msk see hpi  Current Outpatient Medications   Medication Sig Dispense Refill    Meloxicam 7.5 MG Oral Tab 1-2 po every day prn 30 tablet 0    clindamycin 1 % External Lotion Apply 1 Application topically daily. 60 mL 2    tretinoin 0.025 % External Cream Apply 1 Application topically nightly. Use as tolerated 30 g 3    cholecalciferol 50 MCG (2000 UT) Oral Tab Take 1 tablet (2,000 Units total) by mouth daily. 90 tablet 0    lisinopril 5 MG Oral Tab Take 1 tablet (5 mg total) by mouth daily. 90 tablet 3    VICTOZA 18 MG/3ML Subcutaneous Solution Pen-injector Inject 1.8 mg into the skin daily. 27 mL 0    rosuvastatin 20 MG Oral Tab Take 1 tablet (20 mg total) by mouth daily. 90 tablet 1    metFORMIN HCl 1000 MG Oral Tab Take 1 tablet (1,000 mg total) by mouth 2 (two) times daily with meals. TK 1 T PO  tablet 1    Insulin Pen Needle (TRUEPLUS 5-BEVEL PEN NEEDLES) 32G X 4 MM Does not apply Misc 1 strip by In Vitro route daily. 100 each 1    Glucose Blood (ONETOUCH ULTRA) In Vitro Strip Check glucose once daily 100 each 3    Glucose Blood (ONETOUCH VERIO) In Vitro Strip Check sugars once a day 100 strip 1    Glucose Blood (ONETOUCH ULTRA) In Vitro Strip Use to check blood sugar levels 2x daily 200 each 0    Glucose Blood (ONETOUCH VERIO) In Vitro Strip TEST ONCE DAILY 100 strip 0    ONETOUCH DELICA PLUS [id number] Does not apply Misc TEST ONCE DAILY 100 each 0    Blood Glucose  Monitoring Suppl (ONETOUCH VERIO) w/Device Does not apply Kit 1 Device 2 (two) times a day. 1 kit 0    Elastic Bandages & Supports (MEDICAL COMPRESSION STOCKINGS) Does not apply Misc 1 Application by Does not apply route daily. 2 each 5    Mometasone Furoate 0.1 % External Cream Apply a small dab to affected area of skin once per day as needed. (Patient not taking: Reported on 2024) 45 g 0     Allergies:Allergies[1]    Objective:   Physical Exam  Cardiovascular:      Rate and Rhythm: Normal rate and regular rhythm.      Pulses: Normal pulses.      Heart sounds: Normal heart sounds.   Pulmonary:      Effort: Pulmonary effort is normal.      Breath sounds: Normal breath sounds.   Musculoskeletal:         General: No swelling, tenderness, deformity or signs of injury.      Right lower leg: No edema.      Left lower leg: No edema.   Neurological:      Mental Status: She is alert.         Assessment & Plan:   1. Encounter for screening breast examination    2. Screening mammogram for breast cancer    Knee pain resolved   Needs to do strenghtening exercises    Orders Placed This Encounter   Procedures    Prevnar 20 (PCV20) [60240]       Meds This Visit:  Requested Prescriptions     Signed Prescriptions Disp Refills    Meloxicam 7.5 MG Oral Tab 30 tablet 0     Si-2 po every day prn       Imaging & Referrals:  PCV20 VACCINE FOR INTRAMUSCULAR USE  [facility] SOREN 2D+3D SCREENING BILAT (CPT=77067/14765)         [1]   Allergies  Allergen Reactions    Fish-Derived Products HIVES and WHEEZING    Shellfish-Derived Products HIVES and WHEEZING

## 2024-11-11 ENCOUNTER — TELEPHONE (OUTPATIENT)
Dept: FAMILY MEDICINE CLINIC | Facility: CLINIC | Age: 59
End: 2024-11-11

## 2024-11-20 ENCOUNTER — TELEPHONE (OUTPATIENT)
Dept: ENDOCRINOLOGY CLINIC | Facility: CLINIC | Age: 59
End: 2024-11-20

## 2024-11-20 NOTE — TELEPHONE ENCOUNTER
Received fax from [facility] [place], attached is a eye exam dated on 11/6/24, exam put in flowsheet & placed in provider folder for review.

## 2024-12-02 NOTE — TELEPHONE ENCOUNTER
Patient's daughter Pauline called to ask for refill of inhaler to send to [facility].  She did not know name of inhaler.  She will call patient to clarify and call us back with name of medication to refill.

## 2024-12-04 NOTE — TELEPHONE ENCOUNTER
Patient daughter Pauline katz ( name and date of birth of patient verified )       States patient is asking for Albuterol inhaler  , not in distress now but needs to have it for the winter      ( Not on current med list but found previous RX )     Last office visit 11/5/2024      Pharmacy confirmed      Medication pended for your review / approval

## 2024-12-05 RX ORDER — ALBUTEROL SULFATE 90 UG/1
2 INHALANT RESPIRATORY (INHALATION) EVERY 6 HOURS PRN
Qty: 3 EACH | Refills: 1 | Status: SHIPPED | OUTPATIENT
Start: 2024-12-05

## 2024-12-05 RX ORDER — ROSUVASTATIN CALCIUM 20 MG/1
20 TABLET, COATED ORAL DAILY
Qty: 90 TABLET | Refills: 1 | Status: SHIPPED | OUTPATIENT
Start: 2024-12-05

## 2024-12-05 NOTE — TELEPHONE ENCOUNTER
Endocrine refill protocol for lipid lowering medications    Protocol Criteria:  PASSED Reason: N/A    If all below requirements are met, send a 90-day supply with 1 refill per provider protocol.    Verify appointment with Endocrinology completed in the last 6 months or scheduled in the next 3 months.  Lipid panel must have been completed in the last 12 months   ALT result below 80  LDL result below 130    Last completed office visit:6/18/2024 Angelita Corbin MD   Next scheduled Follow up:   Future Appointments   Date Time Provider Department Center   12/17/2024  3:30 PM Angelita Corbin MD [id number]      Last Lipid panel date: Cholesterol: 122, done on 6/15/2024.  HDL Cholesterol: 51, done on 6/15/2024.  TriGlycerides 66, done on 6/15/2024.  LDL Cholesterol: 57, done on 6/15/2024.     Last ALT result: Last ALT was 22 done on 6/15/2024.  Last AST was 15 done on 6/15/2024.

## 2024-12-17 ENCOUNTER — OFFICE VISIT (OUTPATIENT)
Dept: ENDOCRINOLOGY CLINIC | Facility: CLINIC | Age: 59
End: 2024-12-17
Payer: MEDICARE

## 2024-12-17 VITALS
WEIGHT: 200.63 LBS | SYSTOLIC BLOOD PRESSURE: 124 MMHG | DIASTOLIC BLOOD PRESSURE: 79 MMHG | BODY MASS INDEX: 34.25 KG/M2 | HEART RATE: 76 BPM | HEIGHT: 64 IN

## 2024-12-17 DIAGNOSIS — E11.69 TYPE 2 DIABETES MELLITUS WITH OTHER SPECIFIED COMPLICATION, WITHOUT LONG-TERM CURRENT USE OF INSULIN (HCC): Primary | ICD-10-CM

## 2024-12-17 DIAGNOSIS — E11.65 TYPE 2 DIABETES MELLITUS WITH HYPERGLYCEMIA, UNSPECIFIED WHETHER LONG TERM INSULIN USE (HCC): ICD-10-CM

## 2024-12-17 LAB
CARTRIDGE LOT#: ABNORMAL NUMERIC
GLUCOSE BLOOD: 83
HEMOGLOBIN A1C: 6.4 % (ref 4.3–5.6)
TEST STRIP LOT #: NORMAL NUMERIC

## 2024-12-17 PROCEDURE — 3074F SYST BP LT 130 MM HG: CPT | Performed by: INTERNAL MEDICINE

## 2024-12-17 PROCEDURE — 83036 HEMOGLOBIN GLYCOSYLATED A1C: CPT | Performed by: INTERNAL MEDICINE

## 2024-12-17 PROCEDURE — 3008F BODY MASS INDEX DOCD: CPT | Performed by: INTERNAL MEDICINE

## 2024-12-17 PROCEDURE — 3078F DIAST BP <80 MM HG: CPT | Performed by: INTERNAL MEDICINE

## 2024-12-17 PROCEDURE — 99213 OFFICE O/P EST LOW 20 MIN: CPT | Performed by: INTERNAL MEDICINE

## 2024-12-17 PROCEDURE — 82947 ASSAY GLUCOSE BLOOD QUANT: CPT | Performed by: INTERNAL MEDICINE

## 2024-12-17 RX ORDER — LIRAGLUTIDE 6 MG/ML
1.8 INJECTION SUBCUTANEOUS DAILY
Qty: 27 ML | Refills: 1 | Status: SHIPPED | OUTPATIENT
Start: 2024-12-17

## 2024-12-17 NOTE — PROGRESS NOTES
HISTORY OF PRESENT ILLNESS   Young Abarca is a 59 year old female who presents for diabetes.    Other Significant medical hx includes: DM, hyperlipidemia     Diagnosed with diabetes around age 52     Family hx of diabetes denies     Dietary compliance: moderate  Exercise: No.   Polyuria/polydipsia: No  Blurred vision: No    Episodes of hypoglycemia: No  Blood Glucose:  Glucometer:  Checking 1-2 times a day  Fasting 120-130  Pre dinner:     No low BG under 70    Medications for DM  Metformin 1,000 mg po BID  Victoza 1.8 mg daily      T/f:   Glipizide 2.5 mg with dinner only --> stopped due to low BG    Avoid SGLT2 with LE infection/ wound     Stopped amaryl due to low BG   Did not tolerate trulicity well     REVIEW OF SYSTEMS  Eyes: Diabetic retinopathy present: No            Most recent visit to eye doctor in last 12 months: 2024 scanned in epic     CV: Cardiovascular disease present: No         Hypertension present: Yes         Hyperlipidemia present: Yes         Peripheral Vascular Disease present: no         Heart Failure: No    : Nephropathy present: No    Neuro: Neuropathy present: yes     Skin: Infection or ulceration: No            Follows with Podiatry: yes    Feet: Denies hx of  amputation, Charcot foot, angioplasty or vascular surgery,  Positive hx of  neuropathy (pain, burning, numbness) stable     Osteoporosis: No    Thyroid disease: No    Psychiatric:    Little interest or pleasure in doing things:No   Feeling down, depressed or hopeless : No        Medications:     Current Outpatient Medications:     VICTOZA 18 MG/3ML Subcutaneous Solution Pen-injector, Inject 1.8 mg into the skin daily., Disp: 27 mL, Rfl: 1    metFORMIN HCl 1000 MG Oral Tab, Take 1 tablet (1,000 mg total) by mouth 2 (two) times daily with meals. TK 1 T PO BID, Disp: 180 tablet, Rfl: 1    albuterol 108 (90 Base) MCG/ACT Inhalation Aero Soln, Inhale 2 puffs into the lungs every 6 (six) hours as needed for Wheezing., Disp: 3  each, Rfl: 1    ROSUVASTATIN 20 MG Oral Tab, TAKE 1 TABLET(20 MG) BY MOUTH DAILY, Disp: 90 tablet, Rfl: 1    Meloxicam 7.5 MG Oral Tab, 1-2 po every day prn, Disp: 30 tablet, Rfl: 0    clindamycin 1 % External Lotion, Apply 1 Application topically daily., Disp: 60 mL, Rfl: 2    tretinoin 0.025 % External Cream, Apply 1 Application topically nightly. Use as tolerated, Disp: 30 g, Rfl: 3    cholecalciferol 50 MCG (2000 UT) Oral Tab, Take 1 tablet (2,000 Units total) by mouth daily., Disp: 90 tablet, Rfl: 0    lisinopril 5 MG Oral Tab, Take 1 tablet (5 mg total) by mouth daily., Disp: 90 tablet, Rfl: 3    Insulin Pen Needle (TRUEPLUS 5-BEVEL PEN NEEDLES) 32G X 4 MM Does not apply Misc, 1 strip by In Vitro route daily., Disp: 100 each, Rfl: 1    Glucose Blood (ONETOUCH ULTRA) In Vitro Strip, Check glucose once daily, Disp: 100 each, Rfl: 3    Glucose Blood (ONETOUCH VERIO) In Vitro Strip, Check sugars once a day, Disp: 100 strip, Rfl: 1    Glucose Blood (ONETOUCH ULTRA) In Vitro Strip, Use to check blood sugar levels 2x daily, Disp: 200 each, Rfl: 0    Glucose Blood (ONETOUCH VERIO) In Vitro Strip, TEST ONCE DAILY, Disp: 100 strip, Rfl: 0    ONETOUCH DELICA PLUS [id number] Does not apply Misc, TEST ONCE DAILY, Disp: 100 each, Rfl: 0    Blood Glucose Monitoring Suppl (ONETOUCH VERIO) w/Device Does not apply Kit, 1 Device 2 (two) times a day., Disp: 1 kit, Rfl: 0    Elastic Bandages & Supports (MEDICAL COMPRESSION STOCKINGS) Does not apply Misc, 1 Application by Does not apply route daily., Disp: 2 each, Rfl: 5     Allergies:   Allergies   Allergen Reactions    Fish-Derived Products HIVES and WHEEZING    Shellfish-Derived Products HIVES and WHEEZING       Social History:   Social History     Socioeconomic History    Marital status:     Number of children: 5   Occupational History    Occupation: unemployed   Tobacco Use    Smoking status: Never     Passive exposure: Never    Smokeless tobacco: Never   Vaping Use     Vaping status: Never Used   Substance and Sexual Activity    Alcohol use: Not Currently    Drug use: Never    Sexual activity: Yes     Partners: Male   Other Topics Concern    Reaction to local anesthetic Yes     Comment: HR increases. Need to use Lidocaine with out EPI    Right Handed Yes    Caffeine Concern No    Exercise Yes    Seat Belt Yes    Special Diet No    Stress Concern No    Weight Concern No    Pt has a pacemaker No    Pt has a defibrillator No       Medical History:   Past Medical History:    Asthma (HCC)    Diabetes (HCC)    History of blood clots       Surgical history:   Past Surgical History:   Procedure Laterality Date    Colonoscopy           PHYSICAL EXAM    Vitals reviewed      General Appearance:  alert, well developed, in no acute distress  Head: Atraumatic  Eyes:  normal conjunctivae, sclera., normal sclera and normal pupils  Throat/Neck: normal sound to voice. Normal hearing, normal speech  Respiratory:  Speaking in full sentences, non-labored. no increased work of breathing, no audible wheezing    Neuro: motor grossly intact, moving all extremities without difficulty  Psychiatric:  oriented to time, self, and place  Extremities:  June 2024  Bilateral barefoot skin diabetic exam is normal, visualized feet and the appearance is normal.  Bilateral monofilament/sensation of both feet is normal.  Pulsation pedal pulse exam of both lower legs/feet is normal as well.        Pertinent data reviewed with the patient     ASSESSMENT/PLAN:    1. Diabetes Mellitus Type 2 :  a1c is 6.4 % ( 12/2024)    Reviewed Diabetes mellitus type 2 is an endocrine disorder with insulin resistance and deficiency, resulting in high blood sugars. Complications can include cardiovascular disease, neuropathy (nerve damage), nephropathy (kidney disease), retinopathy (eye damage)   Discussed importance of glycemic control and patient verbalized understanding of diabetes pathogenesis and glycemic control to prevent the  diabetes complications   -Lifestyle/ Diet/ Exercise reviewed the following   -Discussed importance of SBGM  -Low CHO diet, recommend 45gm per meal or 135gm per day  -Low CHO diet and CHO counting  -Checking BG levels at home      Diabetic Medications   Medications for DM    Metformin 1,000 mg po BID  Take with food  GI SE reviewed    Victoza 1.8 mg daily  No personal or family history of MEN syndrome  Patient counselled regarding side effects including injection site reactions, nausea, vomiting, diarrhea, pancreatitis, gastroparesis and rare side effect evelio Mitchell syndrome.      Reviewed Risk of Hypoglycemia / Recognition & Treatment - rule of 15       -Reviewed Mechanism of Action/ Risk Benefit / Side effects of each medication     -Patient verbalized understanding and is in agreement to plan for [facility] Diabetes management, agrees to notify clinic of changes in medical care or medications.      2. Hyperlipidemia / Lipids   Reviewed goal lipid/ low fat diet  Stain therapy / Taking at night   Discussed the potential side effects of statins including muscle and liver injury.        3. BP is normal today        Call with BG as discussed.       RTC in 5-6  months            Orders Placed This Encounter   Procedures    POC HemoCue Glucose 201 (Finger stick glucose)    POC Glycohemoglobin [81234]       Angelita Corbin MD  This visit was conducted with the help of a Indonesian interpretor : [id number]. Patient verbalized a complete  understanding of all of the above and did not have any further questions.

## 2024-12-18 ENCOUNTER — PATIENT MESSAGE (OUTPATIENT)
Dept: ENDOCRINOLOGY CLINIC | Facility: CLINIC | Age: 59
End: 2024-12-18

## 2024-12-18 RX ORDER — LANCETS 30 GAUGE
1 EACH MISCELLANEOUS DAILY
Qty: 100 EACH | Refills: 1 | Status: SHIPPED | OUTPATIENT
Start: 2024-12-18

## 2024-12-18 RX ORDER — BLOOD SUGAR DIAGNOSTIC
STRIP MISCELLANEOUS
Qty: 100 STRIP | Refills: 1 | Status: SHIPPED | OUTPATIENT
Start: 2024-12-18

## 2024-12-18 NOTE — TELEPHONE ENCOUNTER
Endocrine Refill protocol for Glucose testing supplies     Protocol Criteria: PASSED Reason: N/A    If below requirement is met, send a 90-day supply with 1 refill per provider protocol.    Verify appointment with Endocrinology completed in the last 6 months or scheduled in the next 3 months.    Last completed office visit: 12/17/2024 Angelita Corbin MD   Next scheduled Follow up: No future appointments.

## 2024-12-19 RX ORDER — BLOOD SUGAR DIAGNOSTIC
STRIP MISCELLANEOUS
Qty: 100 EACH | Refills: 3 | Status: CANCELLED | OUTPATIENT
Start: 2024-12-19

## 2024-12-20 ENCOUNTER — TELEPHONE (OUTPATIENT)
Dept: DERMATOLOGY CLINIC | Facility: CLINIC | Age: 59
End: 2024-12-20

## 2024-12-20 RX ORDER — BLOOD SUGAR DIAGNOSTIC
STRIP MISCELLANEOUS
Qty: 100 EACH | Refills: 0 | Status: SHIPPED | OUTPATIENT
Start: 2024-12-20

## 2024-12-20 NOTE — TELEPHONE ENCOUNTER
Endostaff: Just to confirm the Delica lancets should work with the One Touch ultra meter correct?  If not please send corrected prescription for the lancets.  Thanks.

## 2024-12-21 NOTE — TELEPHONE ENCOUNTER
Medication PA Requested:  tretinoin    0.025% cream                                                     CoverMyMeds Used:  Key:  Quantity: 30gm  Day Supply:30  Sig: use QHS  DX Code:  L70.0                                   CPT code (if applicable):   Case Number/Pending Ref#:

## 2025-01-09 RX ORDER — CHOLECALCIFEROL (VITAMIN D3) 50 MCG
2000 TABLET ORAL DAILY
Qty: 90 TABLET | Refills: 3 | Status: SHIPPED | OUTPATIENT
Start: 2025-01-09

## 2025-01-09 NOTE — TELEPHONE ENCOUNTER
Protocol Failed/ No Protocol    Requested Prescriptions   Pending Prescriptions Disp Refills    CHOLECALCIFEROL 50 MCG (2000 UT) Oral Tab [Pharmacy Med Name: VITAMIN D3 50MCG TABLETS] 90 tablet 0     Sig: TAKE 1 TABLET BY MOUTH DAILY       There is no refill protocol information for this order            Recent Outpatient Visits              3 weeks ago Type 2 diabetes mellitus with other specified complication, without long-term current use of insulin (HCC)    [facility], Angelita Greenwood MD    Office Visit    2 months [facility]    [facility]    2 months ago Acne vulgaris    [facility] [place]    Office Visit    6 months ago Type 2 diabetes mellitus with hyperglycemia, unspecified whether long term insulin use (HCC)    [facility], Angelita Greenwood MD    Office Visit    7 months ago Rash and nonspecific skin eruption    [facility]    Office Visit

## 2025-01-23 DIAGNOSIS — M79.604 RIGHT LEG PAIN: ICD-10-CM

## 2025-01-29 ENCOUNTER — TELEPHONE (OUTPATIENT)
Dept: FAMILY MEDICINE CLINIC | Facility: CLINIC | Age: 60
End: 2025-01-29

## 2025-01-29 DIAGNOSIS — I99.8 VASCULAR INSUFFICIENCY: Primary | ICD-10-CM

## 2025-01-29 NOTE — TELEPHONE ENCOUNTER
[facility] Ref[facility] (supporting Rick Le DO)         1/23/25  6:06 PM  Refills have been requested for the following medications:         Elastic Bandages & Supports (MEDICAL COMPRESSION STOCKINGS) Does not apply Misc [Rick Le]      Patient Comment: Pantyhose compression stockings please.     Preferred pharmacy: Other - [facility]. [address]. [phone]

## 2025-01-31 ENCOUNTER — PATIENT MESSAGE (OUTPATIENT)
Dept: FAMILY MEDICINE CLINIC | Facility: CLINIC | Age: 60
End: 2025-01-31

## 2025-03-22 ENCOUNTER — TELEPHONE (OUTPATIENT)
Dept: FAMILY MEDICINE CLINIC | Facility: CLINIC | Age: 60
End: 2025-03-22

## 2025-05-22 ENCOUNTER — TELEPHONE (OUTPATIENT)
Dept: ENDOCRINOLOGY CLINIC | Facility: CLINIC | Age: 60
End: 2025-05-22

## 2025-05-22 RX ORDER — ALBUTEROL SULFATE 90 UG/1
2 INHALANT RESPIRATORY (INHALATION) EVERY 6 HOURS PRN
Qty: 54 G | Refills: 0 | Status: SHIPPED | OUTPATIENT
Start: 2025-05-22

## 2025-05-22 NOTE — TELEPHONE ENCOUNTER
Medication Quantity Refills Start End   VICTOZA 18 MG/3ML Subcutaneous Solution Pen-injector 27 mL 1 12/17/2024 --   Sig:   Inject 1.8 mg into the skin daily.     Route:   Subcutaneous     Note to Pharmacy:   ATTN: The order is for generic Victoza     PATRICIA:   Yes         PRIOR AUTH REQUESTED VIA [employer]--    ORTIZ: [id number]

## 2025-05-22 NOTE — TELEPHONE ENCOUNTER
Future Appointments   Date Time Provider Department Center   6/17/2025 12:50 PM [facility]

## 2025-05-29 RX ORDER — ROSUVASTATIN CALCIUM 20 MG/1
20 TABLET, COATED ORAL DAILY
Qty: 90 TABLET | Refills: 1 | Status: SHIPPED | OUTPATIENT
Start: 2025-05-29

## 2025-05-29 RX ORDER — LISINOPRIL 5 MG/1
5 TABLET ORAL DAILY
Qty: 30 TABLET | Refills: 0 | Status: SHIPPED | OUTPATIENT
Start: 2025-05-29

## 2025-05-29 NOTE — TELEPHONE ENCOUNTER
To pharmacy: Needs to make an apt for further refills     Future Appointments   Date Time Provider Department Center   6/17/2025 12:50 PM [facility]

## 2025-05-29 NOTE — TELEPHONE ENCOUNTER
Endocrine refill protocol for lipid lowering medications    Protocol Criteria:  FAILED Reason: No Visit in required time frame mcm sent    If all below requirements are met, send a 90-day supply with 1 refill per provider protocol.    Verify appointment with Endocrinology completed in the last 6 months or scheduled in the next 3 months.  Lipid panel must have been completed in the last 12 months   ALT result below 80  LDL result below 130    Last completed office visit:12/17/2024 Angelita Corbin MD   Last completed telemed visit: Visit date not found  Next scheduled Follow up: no future appt mcm sent     Last Lipid panel date: Cholesterol: 122, done on 6/15/2024.  HDL Cholesterol: 51, done on 6/15/2024.  TriGlycerides 66, done on 6/15/2024.  LDL Cholesterol: 57, done on 6/15/2024.     Last ALT result: Last ALT was 22 done on 6/15/2024.  Last AST was 15 done on 6/15/2024.

## 2025-06-13 NOTE — TELEPHONE ENCOUNTER
PA has been denied.     MA team please advise if fax has been received with denial details.     [facility] [facility] Case ID: [id number]    [phone]

## 2025-06-16 NOTE — TELEPHONE ENCOUNTER
ASHOK Real  I have not seen the patient since Dec 2024  We have tried contacting her multiple times today   She will be seeing you tomorrow  If you feel she should FU with us and if patient is agreeable, she can please call the office to speak with RN  [phone]    Thanks

## 2025-06-16 NOTE — TELEPHONE ENCOUNTER
Call conducted with  ID [id number], Jesus Alberto.  Left message to call back.   Call center, when patient returns call, may offer in person follow up today with Dr. Corbin at 315 pm if still available.     As of this morning, GREG ng fax for victoza has not been received. Called [facility] [employer]. Patient must try and fail Mounjaro and ozempic and rybelsus. Requested a copy of the denial be faxed to the office.

## 2025-06-16 NOTE — TELEPHONE ENCOUNTER
Noted.  Please let the patient know.  Please book appointment this week and add on at 3:15 PM today patient was last seen in December 2024.  I would like to meet her to discuss other options.     Also, did the insurance say what the preferred medications?  Thanks.

## 2025-06-17 ENCOUNTER — OFFICE VISIT (OUTPATIENT)
Dept: FAMILY MEDICINE CLINIC | Facility: CLINIC | Age: 60
End: 2025-06-17
Payer: MEDICARE

## 2025-06-17 ENCOUNTER — TELEPHONE (OUTPATIENT)
Dept: FAMILY MEDICINE CLINIC | Facility: CLINIC | Age: 60
End: 2025-06-17

## 2025-06-17 VITALS
WEIGHT: 201 LBS | HEIGHT: 64 IN | BODY MASS INDEX: 34.31 KG/M2 | OXYGEN SATURATION: 97 % | SYSTOLIC BLOOD PRESSURE: 131 MMHG | HEART RATE: 75 BPM | RESPIRATION RATE: 20 BRPM | DIASTOLIC BLOOD PRESSURE: 79 MMHG | TEMPERATURE: 97 F

## 2025-06-17 DIAGNOSIS — E11.9 DIABETES MELLITUS TYPE 2 IN NONOBESE (HCC): Primary | ICD-10-CM

## 2025-06-17 DIAGNOSIS — Z12.31 SCREENING MAMMOGRAM FOR BREAST CANCER: ICD-10-CM

## 2025-06-17 DIAGNOSIS — Z12.11 SCREENING FOR COLON CANCER: ICD-10-CM

## 2025-06-17 LAB
CARTRIDGE EXPIRATION DATE: ABNORMAL DATE
CREAT UR-SCNC: 75.8 MG/DL
HEMOGLOBIN A1C: 6.8 % (ref 4.3–5.6)
MICROALBUMIN UR-MCNC: <0.3 MG/DL

## 2025-06-17 PROCEDURE — 83036 HEMOGLOBIN GLYCOSYLATED A1C: CPT | Performed by: FAMILY MEDICINE

## 2025-06-17 PROCEDURE — 3044F HG A1C LEVEL LT 7.0%: CPT | Performed by: FAMILY MEDICINE

## 2025-06-17 PROCEDURE — 3075F SYST BP GE 130 - 139MM HG: CPT | Performed by: FAMILY MEDICINE

## 2025-06-17 PROCEDURE — 3078F DIAST BP <80 MM HG: CPT | Performed by: FAMILY MEDICINE

## 2025-06-17 PROCEDURE — 99214 OFFICE O/P EST MOD 30 MIN: CPT | Performed by: FAMILY MEDICINE

## 2025-06-17 PROCEDURE — 3061F NEG MICROALBUMINURIA REV: CPT | Performed by: FAMILY MEDICINE

## 2025-06-17 PROCEDURE — 3008F BODY MASS INDEX DOCD: CPT | Performed by: FAMILY MEDICINE

## 2025-06-17 NOTE — PROGRESS NOTES
Subjective:   Patient ID: Young Abarca is a 60 year old female.    HPI  Patient for f/u hypertension   Denies any chest pain shortness of breath or headaches.   Monitoring blood pressure at home and is below 135/85. Needs refill of medications.   Also f/u diabetes   HbA1C is 6.8 todays   States that should be watching diet more and exercising   History/Other:   Review of Systems    Constitutional: Negative.  Negative for activity change, appetite change, diaphoresis and fatigue.     Respiratory: Negative.  Negative for apnea, cough, chest tightness and shortness of breath.    Cardiovascular: Negative.  Negative for chest pain, palpitations and leg swelling.   Gastrointestinal: Negative.  Negative for abdominal pain.   Skin: Negative.             Current Medications[1]  Allergies:Allergies[2]    Objective:   Physical Exam  Constitutional:       Appearance: She is well-developed.   Cardiovascular:      Rate and Rhythm: Normal rate and regular rhythm.      Heart sounds: Normal heart sounds.   Pulmonary:      Effort: Pulmonary effort is normal.      Breath sounds: Normal breath sounds.   Abdominal:      General: Bowel sounds are normal.      Palpations: Abdomen is soft.   Neurological:      Mental Status: She is alert.      Deep Tendon Reflexes: Reflexes are normal and symmetric.         Assessment & Plan:   1. Diabetes mellitus type 2 in nonobese (HCC)    2. Screening mammogram for breast cancer    3. Screening for colon cancer    Diet and exercise and f/u in 3 months     Orders Placed This Encounter   Procedures    POC Hemoglobin A1C    Microalb/Creat Ratio, Random Urine [E]    Comp Metabolic Panel (14)    Lipid Panel    Assay, Thyroid Stim Hormone    CBC With Differential With Platelet       Meds This Visit:  Requested Prescriptions      No prescriptions requested or ordered in this encounter       Imaging & Referrals:  COLOGUARD COLON CANCER SCREENING (EXTERNAL)  [facility] 2D+3D SCREENING BILAT (CPT=77067/73301)          [1]   Current Outpatient Medications   Medication Sig Dispense Refill    lisinopril 5 MG Oral Tab Take 1 tablet (5 mg total) by mouth daily. 30 tablet 0    ROSUVASTATIN 20 MG Oral Tab TAKE 1 TABLET(20 MG) BY MOUTH DAILY 90 tablet 1    albuterol 108 (90 Base) MCG/ACT Inhalation Aero Soln Inhale 2 puffs into the lungs every 6 (six) hours as needed for Wheezing. 54 g 0    cholecalciferol 50 MCG (2000 UT) Oral Tab Take 1 tablet (2,000 Units total) by mouth daily. 90 tablet 3    VICTOZA 18 MG/3ML Subcutaneous Solution Pen-injector Inject 1.8 mg into the skin daily. 27 mL 1    metFORMIN HCl 1000 MG Oral Tab Take 1 tablet (1,000 mg total) by mouth 2 (two) times daily with meals. TK 1 T PO  tablet 1    Meloxicam 7.5 MG Oral Tab 1-2 po every day prn 30 tablet 0    clindamycin 1 % External Lotion Apply 1 Application topically daily. 60 mL 2    tretinoin 0.025 % External Cream Apply 1 Application topically nightly. Use as tolerated 30 g 3    Insulin Pen Needle (TRUEPLUS 5-BEVEL PEN NEEDLES) 32G X 4 MM Does not apply Misc 1 strip by In Vitro route daily. 100 each 1    Glucose Blood (ONETOUCH ULTRA) In Vitro Strip Use to check blood sugar levels 2x daily 200 each 0    Elastic Bandages & Supports (MEDICAL COMPRESSION STOCKINGS) Does not apply Misc 1 Application by Does not apply route daily. 2 each 5    Glucose Blood (ONETOUCH ULTRA) In Vitro Strip Check sugars daily 100 each 0    Lancets (ONETOUCH DELICA PLUS [id number]) Does not apply Misc 1 strip by In Vitro route daily. 100 each 1    Glucose Blood (ONETOUCH VERIO) In Vitro Strip Check sugars once a day 100 strip 1    Glucose Blood (ONETOUCH ULTRA) In Vitro Strip Check glucose once daily 100 each 3    Glucose Blood (ONETOUCH VERIO) In Vitro Strip TEST ONCE DAILY 100 strip 0    Blood Glucose Monitoring Suppl (ONETOUCH VERIO) w/Device Does not apply Kit 1 Device 2 (two) times a day. 1 kit 0   [2]   Allergies  Allergen Reactions    Fish-Derived Products HIVES and  WHEEZING    Shellfish-Derived Products HIVES and WHEEZING

## 2025-06-18 RX ORDER — TIRZEPATIDE 5 MG/.5ML
5 INJECTION, SOLUTION SUBCUTANEOUS WEEKLY
Qty: 2 ML | Refills: 1 | Status: SHIPPED | OUTPATIENT
Start: 2025-07-18

## 2025-06-18 RX ORDER — TIRZEPATIDE 2.5 MG/.5ML
2.5 INJECTION, SOLUTION SUBCUTANEOUS WEEKLY
Qty: 2 ML | Refills: 0 | Status: SHIPPED | OUTPATIENT
Start: 2025-06-18

## 2025-06-18 NOTE — TELEPHONE ENCOUNTER
Received fax from [facility] stating the victoza 18mg/ has been approved for part d eligibility but denied for formulary exception due to pt must try and fail 1 additional drug for pt condition. Drugs that may be covered are mounjaro or ozempic (2mg/3ml, 4mg/3ml, 8mg/3ml, or rybelsus.   Fax placed in denial folder.

## 2025-06-19 ENCOUNTER — TELEPHONE (OUTPATIENT)
Dept: FAMILY MEDICINE CLINIC | Facility: CLINIC | Age: 60
End: 2025-06-19

## 2025-06-19 NOTE — TELEPHONE ENCOUNTER
Closed   6/19/2025  4:11 PM  Close reason: Prior Authorization not required for patient/medication

## 2025-06-19 NOTE — TELEPHONE ENCOUNTER
Shaina Real MD to Em Triage Support  (Selected Message)        6/18/25  6:45 PM  Please call patient and tell her that victosa is not covered by her insurance   I will send mounjaro instead ( done   We will start with the smallest dose then go up   She is to see me in 3 months   This meds is weekly not daily

## 2025-06-19 NOTE — TELEPHONE ENCOUNTER
6/18/25  6:45 PM  Please call patient and tell her that victosa is not covered by her insurance   I will send mounjaro instead ( done   We will start with the smallest dose then go up   She is to see me in 3 months   This meds is weekly not daily

## 2025-06-25 ENCOUNTER — HOSPITAL ENCOUNTER (EMERGENCY)
Facility: HOSPITAL | Age: 60
Discharge: LEFT BEFORE DISCHARGE | End: 2025-06-26
Payer: MEDICARE

## 2025-06-25 VITALS
WEIGHT: 200 LBS | HEIGHT: 65 IN | SYSTOLIC BLOOD PRESSURE: 131 MMHG | DIASTOLIC BLOOD PRESSURE: 81 MMHG | BODY MASS INDEX: 33.32 KG/M2 | OXYGEN SATURATION: 98 % | HEART RATE: 73 BPM | RESPIRATION RATE: 18 BRPM | TEMPERATURE: 98 F

## 2025-06-25 PROCEDURE — 99281 EMR DPT VST MAYX REQ PHY/QHP: CPT

## 2025-06-25 PROCEDURE — 93005 ELECTROCARDIOGRAM TRACING: CPT

## 2025-06-26 ENCOUNTER — PATIENT OUTREACH (OUTPATIENT)
Age: 60
End: 2025-06-26

## 2025-06-26 LAB
ATRIAL RATE: 78 BPM
P AXIS: 66 DEGREES
P-R INTERVAL: 230 MS
Q-T INTERVAL: 408 MS
QRS DURATION: 86 MS
QTC CALCULATION (BEZET): 465 MS
R AXIS: 53 DEGREES
T AXIS: 51 DEGREES
VENTRICULAR RATE: 78 BPM

## 2025-06-26 NOTE — PROGRESS NOTES
06/26/25 1515   Call Details   Call Attempt # 1   SDOH Domain(s) with needs Other (Comment)  (Insulin cost, compression socks cost)   Resources Provided   Resources Provided stefany       Patient was experiencing SOB- visited ED, ECG was completed, patient states she waited 4 hours to be seen and due to health issues with legs she could not longer wait sitting and decided to leave. Patient is in need of allergy medication refill and needs assistance scheduling follow-up appointment with PCP. Patient states she is concerned about her Asthma and wants to make sure she is seen soon. Patient is in need of resources for cost of insulin and compression socks as her insurance does not cover cost. Patient requested a callback tomorrow morning to review resources available and outreach to schedule PCP follow-up appointment.

## 2025-06-26 NOTE — ED INITIAL ASSESSMENT (HPI)
Pt presents with c/o difficulty breathing and chest tightness for the past 3 days. Hx of asthma, used inhaler 1hr PTA with no improvement.

## 2025-07-03 ENCOUNTER — TELEPHONE (OUTPATIENT)
Dept: ENDOCRINOLOGY CLINIC | Facility: CLINIC | Age: 60
End: 2025-07-03

## 2025-07-03 NOTE — TELEPHONE ENCOUNTER
Drug:VICTOZA 18MG/3mll Inj Pen 3ML (2pack)    Sig:administer 1.8MG under the skin daily    Qty:27  Pharmacy message: Plan does not cover this medication.Please call plan at [phone] to initiate PA or call/fax pharmacy to change medication.Patient ID # is [id number]

## 2025-07-07 ENCOUNTER — TELEPHONE (OUTPATIENT)
Dept: FAMILY MEDICINE CLINIC | Facility: CLINIC | Age: 60
End: 2025-07-07

## 2025-07-09 NOTE — TELEPHONE ENCOUNTER
Per TE from 5/22/25 patient is following with her PCP for diabetes care is prescribed mounjaro by PCP.

## 2025-07-16 ENCOUNTER — PATIENT OUTREACH (OUTPATIENT)
Age: 60
End: 2025-07-16

## 2025-07-16 NOTE — PROGRESS NOTES
07/16/25 1159   Call Details   Call Attempt # 5   SDOH Domain(s) with needs Other (Comment)  (Schedule PCP f/u/ resoures for medication cost)   Outreach Outcome   SDOH Overall Outreach Outcome Unable to reach (3 Attempts)     Unable to reach patient to provide resources for medication cost and support to schedule PCP appointments.

## 2025-07-17 ENCOUNTER — TELEPHONE (OUTPATIENT)
Dept: ENDOCRINOLOGY CLINIC | Facility: CLINIC | Age: 60
End: 2025-07-17

## 2025-07-19 NOTE — TELEPHONE ENCOUNTER
Chart reviewed, Jocelyn is not on FUAD    Steven [id number] ()  Was going to get permission from the patient if we can discuss daughter's concern about medication.  Per chart review, pt's PCP changed medication to Mounjaro.  Pt has not been seen since December 2024 but has an upcoming appointment as below.  Pt did not answer.  Left message to call back on Monday between [phone].     Future Appointments   Date Time Provider Department Center   9/29/2025 11:15 AM Shaquille, Angelita, MD [facility]

## 2025-07-21 NOTE — TELEPHONE ENCOUNTER
Patient daughter is calling again.  She states that she needs a call back as soon as possible because the patient will be leaving the [place] this weekend.   Please call

## 2025-07-21 NOTE — TELEPHONE ENCOUNTER
Release of Information is now updated to include patient's daughter Linda FENTON Patient Contact: YanPauline                       Relationship: DAUGHTER            Called and spoke with the patient's daughter. She states that the patient will be leaving out of the country on Saturday and needs a new script for victoza and a PA. Last office visit with Dr. Corbin 12/17/24. Per chart notes patient has been following with PCP Dr. Real for Diabetes care. Victoza recently discontinued by Dr. Real and mounjaro prescribed. Daughter states patient does not want to take mounjaro.     No follow up appts available with Dr. Corbin this week prior to patient leaving on vacation. Would likely need up to date chart notes to submit to insurance for PA. No CDE visits available this week for Blood glucose review.     Samra, you have an opening tomorrow at 2:30, OK to offer patient an appt (Dr. Corbin's pt)?

## 2025-07-21 NOTE — TELEPHONE ENCOUNTER
Update noted. Ok to add her on to my schedule. It seems that 4:45pm is now available.     Thank you!

## 2025-07-22 ENCOUNTER — OFFICE VISIT (OUTPATIENT)
Dept: ENDOCRINOLOGY CLINIC | Facility: CLINIC | Age: 60
End: 2025-07-22
Payer: MEDICARE

## 2025-07-22 VITALS
RESPIRATION RATE: 18 BRPM | HEIGHT: 64 IN | BODY MASS INDEX: 34.83 KG/M2 | WEIGHT: 204 LBS | DIASTOLIC BLOOD PRESSURE: 90 MMHG | HEART RATE: 72 BPM | SYSTOLIC BLOOD PRESSURE: 122 MMHG

## 2025-07-22 DIAGNOSIS — E11.69 TYPE 2 DIABETES MELLITUS WITH OTHER SPECIFIED COMPLICATION, WITHOUT LONG-TERM CURRENT USE OF INSULIN (HCC): Primary | ICD-10-CM

## 2025-07-22 LAB
GLUCOSE BLOOD: 111
TEST STRIP LOT #: NORMAL NUMERIC

## 2025-07-22 PROCEDURE — 82947 ASSAY GLUCOSE BLOOD QUANT: CPT | Performed by: NURSE PRACTITIONER

## 2025-07-22 PROCEDURE — 3074F SYST BP LT 130 MM HG: CPT | Performed by: NURSE PRACTITIONER

## 2025-07-22 PROCEDURE — 99213 OFFICE O/P EST LOW 20 MIN: CPT | Performed by: NURSE PRACTITIONER

## 2025-07-22 PROCEDURE — 3008F BODY MASS INDEX DOCD: CPT | Performed by: NURSE PRACTITIONER

## 2025-07-22 PROCEDURE — 3080F DIAST BP >= 90 MM HG: CPT | Performed by: NURSE PRACTITIONER

## 2025-07-22 RX ORDER — LIRAGLUTIDE 6 MG/ML
1.8 INJECTION SUBCUTANEOUS DAILY
Qty: 27 ML | Refills: 0 | Status: SHIPPED | OUTPATIENT
Start: 2025-07-22

## 2025-07-22 RX ORDER — LANCETS
EACH MISCELLANEOUS
Qty: 200 EACH | Refills: 1 | Status: SHIPPED | OUTPATIENT
Start: 2025-07-22

## 2025-07-22 NOTE — PATIENT INSTRUCTIONS
A1C: 6.8% el 17/06/2025  Glucemia: 111 en la clínica hoy    Medicamentos:  - Continuar con Victoza 1.8 mg jet vez al día por la mañana  - Continuar con Metformina 1000 mg dos veces al día    - Repetir análisis de laboratorio anuales - Ayuno de 8 a 10 horas (se recomienda beber agua)  - Continuar con jet dieta baja en carbohidratos  - Mantenerse activo eber lo hace actualmente    Peso:  Lecturas de peso de las últimas 6 consultas:  22/07/25 92.5 kg  17/06/25 91.2 kg  17/12/24 91 kg  05/11/24 89.8 kg  18/06/24 91 kg (91.6 kg)  16/05/24 91.6 kg    Objetivo de A1C:  <7.0%    Análisis de glucosa en piedad:  Realice la prueba de glucosa 2 veces al día.  Horarios recomendados: Antes del desayuno (en ayunas) y antes del almuerzo o oleksandr.    Objetivos de glucosa en piedad:  Antes del desayuno:  (preferiblemente <110)  Antes de las comidas: <150  2 horas después de las comidas: <180 (preferiblemente <150)    Consulte si persisten los niveles de glucosa en piedad <75 o >200

## 2025-07-22 NOTE — PROGRESS NOTES
Name: Young Abarca  Date: 2025    CHIEF COMPLAINT   Chief Complaint   Patient presents with    Diabetes     Follow Up      HISTORY OF PRESENT ILLNESS   Young Abarca is a 60 year old female who presents for follow up on diabetes management. Patient usually followed by Dr. Corbin.   HbA1C: 6.8% on 2025.   Blood glucose is: 111 in clinic today.   She was seen by PCP around 4 weeks ago and was advised to transition to Mounjaro from Victoza, however due to not being familiar with this medication she was reluctant to make this change.   Patient has been maintained on Victoza and MTF to manage her diabetes and her glucose readings have been stable on this regimen. She will be traveling to [place] for 2-3 months and would like to obtain refill on her medications.      FAMILY HISTORY OF DIABETES  -denies   DIABETES HISTORY  Diagnosed: around age 52   Prior HbA, C or glycohemoglobin were 6.8% on 2025;     PREVIOUS MEDICATION FOR DM:  -Glipzide -d/c'ed due to hypoglycemia   -Avoiding SGLT2 medications due to LE infection/would -- now has resolved   - amaryl - d/c'ed due to hypoglycemia   - Trulicity -d/c'ed due to intolerance symptoms     CURRENT MEDICATIONS FOR DM:  Mounjaro 5mg weekly --has not taken; due to fear and not familiar of medication   Metformin 1,000mg twice daily   Victoza 1.8mg once daily in AM - tolerating well; denies any GI s/e     HOME GLUCOSE READINGS:   Testing BG x  1 daily  Meter or log book today: no  Fastin-130s   Before dinner: 80s   Hypoglycemia present: no     HISTORY OF DIABETES COMPLICATIONS:  History of Retinopathy: denies - last eye exam within the last 12 months: yes - around 11 months ago - Dr. Gallo   History of Neuropathy: denies   History of Nephropathy: denies    ASSOCIATED COMPLICATIONS:   HTN: denies   Hyperlipidemia: yes   Cardiovascular Disease: denies   Peripheral Vascular Disease: denies     DIETARY COMPLIANCE:  Good; following a low carb diet      EXERCISE:   No     REVIEW OF SYSTEMS  Constitutional: Negative for: weight change, fever, fatigue, cold/heat intolerance  Eyes: Negative for:  Visual changes, proptosis, blurring  ENT: Negative for:  dysphagia, neck swelling, dysphonia  Respiratory: Negative for: hemoptysis, shortness of breath, cough, or dyspnea.  Cardiovascular: Negative for:  chest pain, chest discomfort, palpitations  GI: Negative for:  abdominal pain, nausea, vomiting, diarrhea, heartburn, constipation  Neurology: Negative for: headache, dizziness, syncope, numbness/tingling, or weakness.   Genito-Urinary: Negative for: dysuria, frequency or hematuria   Hematology/Lymphatics: Negative for: bruising, easy bleeding, lower extremity edema  Skin: Negative for: rash, blister, infection or ulcers.  Endocrine: Negative for: polyuria, polydipsia. No osteoporosis. No thyroid disease.     MEDICATIONS:   Medications - Current[1]    ALLERGIES:   Allergies[2]    SOCIAL HISTORY:   Social Hx on file[3]    PAST MEDICAL HISTORY:   Past Medical History[4]    PAST SURGICAL HISTORY:   Past Surgical History[5]    PHYSICAL EXAM:   Vitals:    07/22/25 1641   BP: 122/90   BP Location: Right arm   Pulse: 72   Resp: 18   Weight: 204 lb (92.5 kg)   Height: 5' 4\" (1.626 m)     BMI:   Body mass index is 35.02 kg/m².    General Appearance:  alert, well developed, in no acute distress  Nutritional:  no extreme weight gain or loss  Head: Atraumatic  Eyes:  normal conjunctivae, sclera., normal sclera and normal pupils  Throat/Neck: normal sound to voice. Normal hearing, normal speech  Back: no kyphosis  Respiratory:  Speaking in full sentences, non-labored. no increased work of breathing, no audible wheezing    Skin:  normal moisture and skin texture, no visible lesions  Hair and nails: normal scalp hair  Hematologic:  no excessive bruising  Neuro: motor grossly intact, moving all extremities without difficulty  Psychiatric:  oriented to time, self, and place  Extremities:  no obvious extremity swelling, no lesions    Diabetes Foot Exam:  Bilateral barefoot skin diabetic exam is normal, visualized feet and the appearance is normal.  Bilateral monofilament/sensation of both feet is normal.  Pulsation pedal pulse exam of both lower legs/feet is normal as well.    LABS: Pertinent labs reviewed    ASSESSMENT/PLAN:    -Reviewed with patient the pathogenesis of diabetes, clinical significance of A1c, and common complications such as: microvascular, macrovascular and diabetic ketoacidosis. Patient verbalizes understanding of the importance of glycemic control and the goals of therapy.   -Discussed with patient glucose targets ranges (Fasting  and post prandial <180).     1.Type 2 Diabetes Mellitus, controlled  -LAB DATA  HbA1C: 6.8% on 2025   a) Medications  - continue with Victoza 1.8mg once daily in AM   - continue with Metformin 1,000mg twic daily     - reviewed Mounjaro medication option. Patient will think about this and further discuss on potentially starting with Dr. Corbin at next office visit given that she is traveling in a few days for 2-3 months.   - discussed to continue to with low carb diet   - continue to stay active as currently doing   -discussed to start limiting carbs to 30-45gm per meal/ max 135gm per day.   - reviewed target goal BG readings and A1C  -reviewed when to call and notify me of abnormal BG readings.       b) Nephropathy: GFR: 87 on 6/15/2024 and urine MA: <0.3mg/dL on 2025 --> repeat labs   c) UTD with optho   d) Foot exam: normal today 2025  e) cont. Testing BG reading 2x daily - alternate with fasting or before lunch or before dinner   f) Life style changes reviewed     2. Blood Pressure Management   -normotensive today   - on lisinopril 5mg once daily in AM     3. Hyperlipidemia   -LDL: 57 and Tri on 6/15/2024  - on rosuvastatin 20mg nightly   - repeat lipid panel       RTC on 2025 with Dr. Corbin   Patient instructed  to call sooner if they develop Blood glucose readings <75 and/or if they have readings persistently >200.     The risks and benefits of my recommendations, as well as other treatment options were discussed with the patient today. questions were also answered to the best of my knowledge. Patient verbalizes understanding of these issues and agrees to the plan.    7/22/2025  KELSEY Engel       [1]   Current Outpatient Medications:     Tirzepatide (MOUNJARO) 2.5 MG/0.5ML Subcutaneous Solution Auto-injector, Inject 2.5 mg into the skin once a week., Disp: 2 mL, Rfl: 0    Tirzepatide (MOUNJARO) 5 MG/0.5ML Subcutaneous Solution Auto-injector, Inject 5 mg into the skin once a week. To help control diabetes and help you lose weight., Disp: 2 mL, Rfl: 1    lisinopril 5 MG Oral Tab, Take 1 tablet (5 mg total) by mouth daily., Disp: 30 tablet, Rfl: 0    ROSUVASTATIN 20 MG Oral Tab, TAKE 1 TABLET(20 MG) BY MOUTH DAILY, Disp: 90 tablet, Rfl: 1    albuterol 108 (90 Base) MCG/ACT Inhalation Aero Soln, Inhale 2 puffs into the lungs every 6 (six) hours as needed for Wheezing., Disp: 54 g, Rfl: 0    cholecalciferol 50 MCG (2000 UT) Oral Tab, Take 1 tablet (2,000 Units total) by mouth daily., Disp: 90 tablet, Rfl: 3    metFORMIN HCl 1000 MG Oral Tab, Take 1 tablet (1,000 mg total) by mouth 2 (two) times daily with meals. TK 1 T PO BID, Disp: 180 tablet, Rfl: 1    Meloxicam 7.5 MG Oral Tab, 1-2 po every day prn, Disp: 30 tablet, Rfl: 0    clindamycin 1 % External Lotion, Apply 1 Application topically daily., Disp: 60 mL, Rfl: 2    tretinoin 0.025 % External Cream, Apply 1 Application topically nightly. Use as tolerated, Disp: 30 g, Rfl: 3    Insulin Pen Needle (TRUEPLUS 5-BEVEL PEN NEEDLES) 32G X 4 MM Does not apply Misc, 1 strip by In Vitro route daily., Disp: 100 each, Rfl: 1    Glucose Blood (ONETOUCH ULTRA) In Vitro Strip, Use to check blood sugar levels 2x daily, Disp: 200 each, Rfl: 0    Elastic Bandages & Supports (MEDICAL  COMPRESSION STOCKINGS) Does not apply Misc, 1 Application by Does not apply route daily., Disp: 2 each, Rfl: 5    Glucose Blood (ONETOUCH ULTRA) In Vitro Strip, Check sugars daily, Disp: 100 each, Rfl: 0    Lancets (ONETOUCH DELICA PLUS [id number]) Does not apply Misc, 1 strip by In Vitro route daily., Disp: 100 each, Rfl: 1    Glucose Blood (ONETOUCH VERIO) In Vitro Strip, Check sugars once a day, Disp: 100 strip, Rfl: 1    Glucose Blood (ONETOUCH ULTRA) In Vitro Strip, Check glucose once daily, Disp: 100 each, Rfl: 3    Glucose Blood (ONETOUCH VERIO) In Vitro Strip, TEST ONCE DAILY, Disp: 100 strip, Rfl: 0    Blood Glucose Monitoring Suppl (ONETOUCH VERIO) w/Device Does not apply Kit, 1 Device 2 (two) times a day., Disp: 1 kit, Rfl: 0  [2]   Allergies  Allergen Reactions    Fish-Derived Products HIVES and WHEEZING    Shellfish-Derived Products HIVES and WHEEZING   [3]   Social History  Socioeconomic History    Marital status:     Number of children: 5   Occupational History    Occupation: unemployed   Tobacco Use    Smoking status: Never     Passive exposure: Never    Smokeless tobacco: Never   Vaping Use    Vaping status: Never Used   Substance and Sexual Activity    Alcohol use: Not Currently    Drug use: Never    Sexual activity: Yes     Partners: Male   Other Topics Concern    Reaction to local anesthetic Yes     Comment: HR increases. Need to use Lidocaine with out EPI    Right Handed Yes    Caffeine Concern No    Exercise Yes    Seat Belt Yes    Special Diet No    Stress Concern No    Weight Concern No    Pt has a pacemaker No    Pt has a defibrillator No   [4]   Past Medical History:   Asthma (HCC)    Diabetes (HCC)    History of blood clots   [5]   Past Surgical History:  Procedure Laterality Date    Colonoscopy

## 2025-08-18 RX ORDER — ALBUTEROL SULFATE 90 UG/1
2 INHALANT RESPIRATORY (INHALATION) EVERY 6 HOURS PRN
Qty: 54 G | Refills: 3 | Status: SHIPPED | OUTPATIENT
Start: 2025-08-18

## (undated) NOTE — ED AVS SNAPSHOT
Ezio Gottlieb   MRN: [id number]    Department:  [facility] Emergency Department   Date of Visit:  5/20/2019           Disclosure     Insurance plans vary and the physician(s) referred by the ER may not be covered by your plan.  Please contact yo CARE PHYSICIAN AT ONCE OR RETURN IMMEDIATELY TO THE EMERGENCY DEPARTMENT. If you have been prescribed any medication(s), please fill your prescription right away and begin taking the medication(s) as directed.   If you believe that any of the medications

## (undated) NOTE — LETTER
5/15/2019              Pacheco Reese        [address]         Dear Omega Henriquez,    Please advise your employer that you are not able to stand or walk for prolonged periods of time at work. You may work seated only .

## (undated) NOTE — LETTER
8/27/2019              Pacheco Reese        [address] [facility] [address]         To Whom It May Concern,       Patient is diabetic with skin ulcer. It will heal better if edema is controlled.  For this reason graduated compression stoc

## (undated) NOTE — LETTER
9/10/2019          To Whom It May Concern:    Ezio Gottlieb is currently under my medical care and may not return to work at this time. Please excuse Natan Kailey for 1 days. She may return to work on 09/11/2019.   Activity is restricted as follows: NO STAND

## (undated) NOTE — LETTER
Date & Time: 4/24/2019, 4:21 PM  Patient: Angie Coy  Encounter Provider(s):    GREG Sharp       To Whom It May Concern:    Angie Coy was seen and treated in our department on 4/24/2019.  She is expected to follow-up at occupational health

## (undated) NOTE — ED AVS SNAPSHOT
Hanna Yaritza   MRN: [id number]    Department:  [facility] Emergency Department   Date of Visit:  3/16/2019           Disclosure     Insurance plans vary and the physician(s) referred by the ER may not be covered by your plan.  Please contact yo CARE PHYSICIAN AT ONCE OR RETURN IMMEDIATELY TO THE EMERGENCY DEPARTMENT. If you have been prescribed any medication(s), please fill your prescription right away and begin taking the medication(s) as directed.   If you believe that any of the medications

## (undated) NOTE — LETTER
10/13/21        Taniya Paredes  [address] [address]      Dear eRmi Siddiqui records indicate that you have outstanding lab work and or testing that was ordered for you and has not yet been completed:  Orders Placed This Encounter

## (undated) NOTE — LETTER
04/21/21        [address] [place] [address],    Nuestros registros indican que tiene análisis clínicos pendientes y/o pruebas que se ordenaron en reddy nombre que no se musa completado.     Para brindarle la

## (undated) NOTE — LETTER
10/15/2019          To Whom It May Concern:    Mariia Mckee is currently under my medical care and may not return to work until her MRI is reviewed by me. If you require additional information please contact our office.         Sincerely,    Raya Foster He

## (undated) NOTE — LETTER
November 11, 2024    Young Abarca  [address] [facility] [address]    Dear Ms. Abarca:    In addition to helping you feel better when you are sick, we are interested in helping with your routine preventative services.  In the spirit of maintaining your good health, our system indicates that you are       Health Maintenance Due   Topic Date Due    Colorectal Cancer Screening  Never done    Zoster Vaccines (1 of 2) Never done    Pap Smear  09/01/2020    Mammogram  01/10/2021    Diabetes Care Dilated Eye Exam  10/19/2022    MA Annual Health Assessment  Never done    COVID-19 Vaccine (4 - 2023-24 season) 09/01/2024             Please call us to make an appointment at your earliest convenience.   [phone]    Sincerely,      Shaina Real MD

## (undated) NOTE — ED AVS SNAPSHOT
Asha iY   MRN: [id number]    Department:  [facility] Emergency Department   Date of Visit:  4/24/2019           Disclosure     Insurance plans vary and the physician(s) referred by the ER may not be covered by your plan.  Please contact yo CARE PHYSICIAN AT ONCE OR RETURN IMMEDIATELY TO THE EMERGENCY DEPARTMENT. If you have been prescribed any medication(s), please fill your prescription right away and begin taking the medication(s) as directed.   If you believe that any of the medications

## (undated) NOTE — LETTER
10/18/21      Patient: Farheen Bray  : 1/10/1965 Visit date: 10/18/2021    Dear Ha Kuhn,      I examined your patient in consultation today. She has painful triggering and locking of the right index finger.   She performed a steroid inje

## (undated) NOTE — ED AVS SNAPSHOT
Angie Coy   MRN: [id number]    Department:  [facility] Emergency Department   Date of Visit:  6/23/2019           Disclosure     Insurance plans vary and the physician(s) referred by the ER may not be covered by your plan.  Please contact yo CARE PHYSICIAN AT ONCE OR RETURN IMMEDIATELY TO THE EMERGENCY DEPARTMENT. If you have been prescribed any medication(s), please fill your prescription right away and begin taking the medication(s) as directed.   If you believe that any of the medications

## (undated) NOTE — LETTER
06/24/21        Mary Carmen 60      Dear Piter Pacheco records indicate that you have outstanding lab work and or testing that was ordered for you and has not yet been completed:  Orders Placed This Encounter

## (undated) NOTE — ED AVS SNAPSHOT
Geraldo Hernandez   MRN: [id number]    Department:  [facility] Emergency Department   Date of Visit:  2/21/2020           Disclosure     Insurance plans vary and the physician(s) referred by the ER may not be covered by your plan.  Please contact yo CARE PHYSICIAN AT ONCE OR RETURN IMMEDIATELY TO THE EMERGENCY DEPARTMENT. If you have been prescribed any medication(s), please fill your prescription right away and begin taking the medication(s) as directed.   If you believe that any of the medications

## (undated) NOTE — LETTER
September 27, 2021    [address] [address] normal.     Results for orders placed or performed in visit on 09/24/21   LIPID PANEL   Result Value Ref Range    CHOLESTEROL, TOTAL 173

## (undated) NOTE — LETTER
8/23/2019          To Whom It May Concern:    Abimael Akhtar is currently under my medical care and may not return to work at this time. Please excuse Orval Both for 1 days. She may return to work on 08/24/2019.   Activity is restricted as follows: no prolo

## (undated) NOTE — LETTER
01/13/22        Mary Carmen 60      Dear Maribel Laughter records indicate that you have outstanding lab work and or testing that was ordered for you and has not yet been completed:  Orders Placed This Encounter

## (undated) NOTE — LETTER
12/2/2019          To Whom It May Concern:    Krista Bernardo is currently under my medical care and may not return to work for 4 weeks when she will be reevaluated by me. If you require additional information please contact our office.         Sincerely

## (undated) NOTE — LETTER
04/27/21        [address] [id number]      Dear Romain Or records indicate that you have outstanding lab work and or testing that was ordered for you and has not yet been completed:  Orders Placed This Encounter

## (undated) NOTE — LETTER
8/30/2019              Asha Yi        [address] [facility] [address]         Dear Bennie Márquez,    Please advise your employer that you may return to work with no restrictions. Sincerely,      Neeru Collins.  Rah Helton,   [facility]

## (undated) NOTE — LETTER
8/8/2019              [employer]        [address] [facility] [address]         Dear Marlena Ewing,  Please advise your employer that you must keep your right leg elevated at the level of your heart for your entire shift at work.   Also, the

## (undated) NOTE — LETTER
06/02/21        Mary Carmen 60      Dear Jose L Keller records indicate that you have outstanding lab work and or testing that was ordered for you and has not yet been completed:  Orders Placed This Encounter

## (undated) NOTE — LETTER
01/21/21        Pacheco Reese  [address] [address]      Dear Elise Allen records indicate that you have outstanding lab work and or testing that was ordered for you and has not yet been completed:  Orders Placed This Encounter

## (undated) NOTE — LETTER
5/21/2019              [address]         Dear Sherita Patten,    Please advise your employer that you may return to work full duty. Sincerely,      Jen Mcghee.  DO Mimi  [facility]

## (undated) NOTE — LETTER
7/16/2019    Dear [place] Leggett  Please advise your employer that you are not able to stand  at work for more than 40 hours per week you may work seated only. Doctors orders to keep right leg elevated. .     If you require additional information please contact our office [phone]. Sincerely,      [address]. DO Mimi          Document generated by:   Ebenezer STRONG